# Patient Record
Sex: FEMALE | Race: ASIAN | NOT HISPANIC OR LATINO
[De-identification: names, ages, dates, MRNs, and addresses within clinical notes are randomized per-mention and may not be internally consistent; named-entity substitution may affect disease eponyms.]

---

## 2017-09-22 ENCOUNTER — FORM ENCOUNTER (OUTPATIENT)
Age: 62
End: 2017-09-22

## 2017-09-23 ENCOUNTER — APPOINTMENT (OUTPATIENT)
Dept: ULTRASOUND IMAGING | Facility: IMAGING CENTER | Age: 62
End: 2017-09-23
Payer: COMMERCIAL

## 2017-09-23 ENCOUNTER — OUTPATIENT (OUTPATIENT)
Dept: OUTPATIENT SERVICES | Facility: HOSPITAL | Age: 62
LOS: 1 days | End: 2017-09-23
Payer: COMMERCIAL

## 2017-09-23 DIAGNOSIS — Z98.89 OTHER SPECIFIED POSTPROCEDURAL STATES: Chronic | ICD-10-CM

## 2017-09-23 DIAGNOSIS — Z00.8 ENCOUNTER FOR OTHER GENERAL EXAMINATION: ICD-10-CM

## 2017-09-23 PROCEDURE — 93971 EXTREMITY STUDY: CPT | Mod: 26,RT

## 2017-09-23 PROCEDURE — 93971 EXTREMITY STUDY: CPT

## 2017-12-11 ENCOUNTER — APPOINTMENT (OUTPATIENT)
Dept: OPHTHALMOLOGY | Facility: CLINIC | Age: 62
End: 2017-12-11
Payer: COMMERCIAL

## 2017-12-11 PROCEDURE — 92014 COMPRE OPH EXAM EST PT 1/>: CPT

## 2017-12-11 PROCEDURE — 92015 DETERMINE REFRACTIVE STATE: CPT

## 2018-01-02 ENCOUNTER — EMERGENCY (EMERGENCY)
Facility: HOSPITAL | Age: 63
LOS: 1 days | Discharge: HOME | End: 2018-01-02
Attending: EMERGENCY MEDICINE | Admitting: EMERGENCY MEDICINE
Payer: COMMERCIAL

## 2018-01-02 VITALS
TEMPERATURE: 101 F | OXYGEN SATURATION: 100 % | DIASTOLIC BLOOD PRESSURE: 68 MMHG | HEART RATE: 102 BPM | RESPIRATION RATE: 18 BRPM | SYSTOLIC BLOOD PRESSURE: 147 MMHG

## 2018-01-02 DIAGNOSIS — Z98.89 OTHER SPECIFIED POSTPROCEDURAL STATES: Chronic | ICD-10-CM

## 2018-01-02 LAB
ALBUMIN SERPL ELPH-MCNC: 3.8 G/DL — SIGNIFICANT CHANGE UP (ref 3.3–5)
ALP SERPL-CCNC: 72 U/L — SIGNIFICANT CHANGE UP (ref 40–120)
ALT FLD-CCNC: 37 U/L — HIGH (ref 4–33)
AST SERPL-CCNC: 50 U/L — HIGH (ref 4–32)
BASE EXCESS BLDV CALC-SCNC: 0 MMOL/L — SIGNIFICANT CHANGE UP
BASOPHILS # BLD AUTO: 0.01 K/UL — SIGNIFICANT CHANGE UP (ref 0–0.2)
BASOPHILS NFR BLD AUTO: 0.2 % — SIGNIFICANT CHANGE UP (ref 0–2)
BILIRUB SERPL-MCNC: 0.2 MG/DL — SIGNIFICANT CHANGE UP (ref 0.2–1.2)
BLOOD GAS VENOUS - CREATININE: 0.72 MG/DL — SIGNIFICANT CHANGE UP (ref 0.5–1.3)
BUN SERPL-MCNC: 13 MG/DL — SIGNIFICANT CHANGE UP (ref 7–23)
CALCIUM SERPL-MCNC: 8.4 MG/DL — SIGNIFICANT CHANGE UP (ref 8.4–10.5)
CHLORIDE BLDV-SCNC: 108 MMOL/L — SIGNIFICANT CHANGE UP (ref 96–108)
CHLORIDE SERPL-SCNC: 103 MMOL/L — SIGNIFICANT CHANGE UP (ref 98–107)
CO2 SERPL-SCNC: 20 MMOL/L — LOW (ref 22–31)
CREAT SERPL-MCNC: 0.78 MG/DL — SIGNIFICANT CHANGE UP (ref 0.5–1.3)
EOSINOPHIL # BLD AUTO: 0.06 K/UL — SIGNIFICANT CHANGE UP (ref 0–0.5)
EOSINOPHIL NFR BLD AUTO: 1 % — SIGNIFICANT CHANGE UP (ref 0–6)
GAS PNL BLDV: 135 MMOL/L — LOW (ref 136–146)
GLUCOSE BLDV-MCNC: 140 — HIGH (ref 70–99)
GLUCOSE SERPL-MCNC: 134 MG/DL — HIGH (ref 70–99)
HCO3 BLDV-SCNC: 25 MMOL/L — SIGNIFICANT CHANGE UP (ref 20–27)
HCT VFR BLD CALC: 33.4 % — LOW (ref 34.5–45)
HCT VFR BLDV CALC: 35.7 % — SIGNIFICANT CHANGE UP (ref 34.5–45)
HGB BLD-MCNC: 11.2 G/DL — LOW (ref 11.5–15.5)
HGB BLDV-MCNC: 11.6 G/DL — SIGNIFICANT CHANGE UP (ref 11.5–15.5)
IMM GRANULOCYTES # BLD AUTO: 0.02 # — SIGNIFICANT CHANGE UP
IMM GRANULOCYTES NFR BLD AUTO: 0.3 % — SIGNIFICANT CHANGE UP (ref 0–1.5)
LACTATE BLDV-MCNC: 1.2 MMOL/L — SIGNIFICANT CHANGE UP (ref 0.5–2)
LYMPHOCYTES # BLD AUTO: 0.5 K/UL — LOW (ref 1–3.3)
LYMPHOCYTES # BLD AUTO: 8.3 % — LOW (ref 13–44)
MCHC RBC-ENTMCNC: 29.6 PG — SIGNIFICANT CHANGE UP (ref 27–34)
MCHC RBC-ENTMCNC: 33.5 % — SIGNIFICANT CHANGE UP (ref 32–36)
MCV RBC AUTO: 88.4 FL — SIGNIFICANT CHANGE UP (ref 80–100)
MONOCYTES # BLD AUTO: 0.49 K/UL — SIGNIFICANT CHANGE UP (ref 0–0.9)
MONOCYTES NFR BLD AUTO: 8.2 % — SIGNIFICANT CHANGE UP (ref 2–14)
NEUTROPHILS # BLD AUTO: 4.92 K/UL — SIGNIFICANT CHANGE UP (ref 1.8–7.4)
NEUTROPHILS NFR BLD AUTO: 82 % — HIGH (ref 43–77)
NRBC # FLD: 0 — SIGNIFICANT CHANGE UP
PCO2 BLDV: 36 MMHG — LOW (ref 41–51)
PH BLDV: 7.44 PH — HIGH (ref 7.32–7.43)
PLATELET # BLD AUTO: 195 K/UL — SIGNIFICANT CHANGE UP (ref 150–400)
PMV BLD: 10.7 FL — SIGNIFICANT CHANGE UP (ref 7–13)
PO2 BLDV: 66 MMHG — HIGH (ref 35–40)
POTASSIUM BLDV-SCNC: 3.5 MMOL/L — SIGNIFICANT CHANGE UP (ref 3.4–4.5)
POTASSIUM SERPL-MCNC: 4.3 MMOL/L — SIGNIFICANT CHANGE UP (ref 3.5–5.3)
POTASSIUM SERPL-SCNC: 4.3 MMOL/L — SIGNIFICANT CHANGE UP (ref 3.5–5.3)
PROT SERPL-MCNC: 7 G/DL — SIGNIFICANT CHANGE UP (ref 6–8.3)
RBC # BLD: 3.78 M/UL — LOW (ref 3.8–5.2)
RBC # FLD: 13.1 % — SIGNIFICANT CHANGE UP (ref 10.3–14.5)
SAO2 % BLDV: 93.5 % — HIGH (ref 60–85)
SODIUM SERPL-SCNC: 138 MMOL/L — SIGNIFICANT CHANGE UP (ref 135–145)
WBC # BLD: 6 K/UL — SIGNIFICANT CHANGE UP (ref 3.8–10.5)
WBC # FLD AUTO: 6 K/UL — SIGNIFICANT CHANGE UP (ref 3.8–10.5)

## 2018-01-02 PROCEDURE — 99284 EMERGENCY DEPT VISIT MOD MDM: CPT

## 2018-01-02 RX ORDER — ACETAMINOPHEN 500 MG
650 TABLET ORAL ONCE
Qty: 0 | Refills: 0 | Status: DISCONTINUED | OUTPATIENT
Start: 2018-01-02 | End: 2018-01-02

## 2018-01-02 RX ORDER — CEFTRIAXONE 500 MG/1
1 INJECTION, POWDER, FOR SOLUTION INTRAMUSCULAR; INTRAVENOUS ONCE
Qty: 0 | Refills: 0 | Status: COMPLETED | OUTPATIENT
Start: 2018-01-02 | End: 2018-01-02

## 2018-01-02 RX ORDER — KETOROLAC TROMETHAMINE 30 MG/ML
15 SYRINGE (ML) INJECTION ONCE
Qty: 0 | Refills: 0 | Status: DISCONTINUED | OUTPATIENT
Start: 2018-01-02 | End: 2018-01-02

## 2018-01-02 RX ORDER — SODIUM CHLORIDE 9 MG/ML
1000 INJECTION INTRAMUSCULAR; INTRAVENOUS; SUBCUTANEOUS ONCE
Qty: 0 | Refills: 0 | Status: COMPLETED | OUTPATIENT
Start: 2018-01-02 | End: 2018-01-02

## 2018-01-02 RX ORDER — AZITHROMYCIN 500 MG/1
500 TABLET, FILM COATED ORAL ONCE
Qty: 0 | Refills: 0 | Status: COMPLETED | OUTPATIENT
Start: 2018-01-02 | End: 2018-01-02

## 2018-01-02 RX ADMIN — AZITHROMYCIN 250 MILLIGRAM(S): 500 TABLET, FILM COATED ORAL at 23:41

## 2018-01-02 RX ADMIN — Medication 15 MILLIGRAM(S): at 23:42

## 2018-01-02 RX ADMIN — SODIUM CHLORIDE 1000 MILLILITER(S): 9 INJECTION INTRAMUSCULAR; INTRAVENOUS; SUBCUTANEOUS at 22:40

## 2018-01-02 RX ADMIN — CEFTRIAXONE 100 GRAM(S): 500 INJECTION, POWDER, FOR SOLUTION INTRAMUSCULAR; INTRAVENOUS at 22:40

## 2018-01-02 NOTE — ED ADULT NURSE NOTE - OBJECTIVE STATEMENT
pt aox3; reports congestion and sob on exertion for the past few days. Crackles heard on ascultation, pt with productive cough; sputum. No blood tinged sputum noted. Pt has pmh of htn, stents placed in 2011. Denies chest pain.  Denies sob at rest. Able to speak full sentences, no labored breathing observed at rest. Abdomen soft nontender. Pt febrile orally. 20g in right hand; labs sent, cultures sent. Will continue to assess/ monitor.

## 2018-01-02 NOTE — ED ADULT TRIAGE NOTE - CHIEF COMPLAINT QUOTE
Pt c/o shortness of breath with cough since last night, worsening today.  Pt endorsed fever of 102 at home.  Family gave pt Tylenol 1g at 1930 tonight.  PMHx cardiac stent x2.

## 2018-01-03 VITALS
RESPIRATION RATE: 18 BRPM | DIASTOLIC BLOOD PRESSURE: 65 MMHG | HEART RATE: 83 BPM | OXYGEN SATURATION: 100 % | SYSTOLIC BLOOD PRESSURE: 120 MMHG | TEMPERATURE: 99 F

## 2018-01-03 LAB
APPEARANCE UR: CLEAR — SIGNIFICANT CHANGE UP
B PERT DNA SPEC QL NAA+PROBE: SIGNIFICANT CHANGE UP
BASE EXCESS BLDV CALC-SCNC: -5.8 MMOL/L — SIGNIFICANT CHANGE UP
BILIRUB UR-MCNC: NEGATIVE — SIGNIFICANT CHANGE UP
BLOOD GAS VENOUS - CREATININE: 0.53 MG/DL — SIGNIFICANT CHANGE UP (ref 0.5–1.3)
BLOOD UR QL VISUAL: NEGATIVE — SIGNIFICANT CHANGE UP
C PNEUM DNA SPEC QL NAA+PROBE: NOT DETECTED — SIGNIFICANT CHANGE UP
CHLORIDE BLDV-SCNC: 115 MMOL/L — HIGH (ref 96–108)
COLOR SPEC: SIGNIFICANT CHANGE UP
FLUAV H1 2009 PAND RNA SPEC QL NAA+PROBE: POSITIVE — HIGH
FLUAV H1 RNA SPEC QL NAA+PROBE: NOT DETECTED — SIGNIFICANT CHANGE UP
FLUAV H3 RNA SPEC QL NAA+PROBE: NOT DETECTED — SIGNIFICANT CHANGE UP
FLUBV RNA SPEC QL NAA+PROBE: NOT DETECTED — SIGNIFICANT CHANGE UP
GAS PNL BLDV: 137 MMOL/L — SIGNIFICANT CHANGE UP (ref 136–146)
GLUCOSE BLDV-MCNC: 199 — HIGH (ref 70–99)
GLUCOSE UR-MCNC: NEGATIVE — SIGNIFICANT CHANGE UP
HADV DNA SPEC QL NAA+PROBE: NOT DETECTED — SIGNIFICANT CHANGE UP
HCO3 BLDV-SCNC: 20 MMOL/L — SIGNIFICANT CHANGE UP (ref 20–27)
HCOV 229E RNA SPEC QL NAA+PROBE: NOT DETECTED — SIGNIFICANT CHANGE UP
HCOV HKU1 RNA SPEC QL NAA+PROBE: NOT DETECTED — SIGNIFICANT CHANGE UP
HCOV NL63 RNA SPEC QL NAA+PROBE: NOT DETECTED — SIGNIFICANT CHANGE UP
HCOV OC43 RNA SPEC QL NAA+PROBE: NOT DETECTED — SIGNIFICANT CHANGE UP
HCT VFR BLDV CALC: 26.8 % — LOW (ref 34.5–45)
HGB BLDV-MCNC: 8.6 G/DL — LOW (ref 11.5–15.5)
HMPV RNA SPEC QL NAA+PROBE: NOT DETECTED — SIGNIFICANT CHANGE UP
HPIV1 RNA SPEC QL NAA+PROBE: NOT DETECTED — SIGNIFICANT CHANGE UP
HPIV2 RNA SPEC QL NAA+PROBE: NOT DETECTED — SIGNIFICANT CHANGE UP
HPIV3 RNA SPEC QL NAA+PROBE: NOT DETECTED — SIGNIFICANT CHANGE UP
HPIV4 RNA SPEC QL NAA+PROBE: NOT DETECTED — SIGNIFICANT CHANGE UP
KETONES UR-MCNC: NEGATIVE — SIGNIFICANT CHANGE UP
LACTATE BLDV-MCNC: 0.6 MMOL/L — SIGNIFICANT CHANGE UP (ref 0.5–2)
LEUKOCYTE ESTERASE UR-ACNC: NEGATIVE — SIGNIFICANT CHANGE UP
M PNEUMO DNA SPEC QL NAA+PROBE: NOT DETECTED — SIGNIFICANT CHANGE UP
MUCOUS THREADS # UR AUTO: SIGNIFICANT CHANGE UP
NITRITE UR-MCNC: NEGATIVE — SIGNIFICANT CHANGE UP
NON-SQ EPI CELLS # UR AUTO: <1 — SIGNIFICANT CHANGE UP
PCO2 BLDV: 31 MMHG — LOW (ref 41–51)
PH BLDV: 7.39 PH — SIGNIFICANT CHANGE UP (ref 7.32–7.43)
PH UR: 6.5 — SIGNIFICANT CHANGE UP (ref 4.6–8)
PO2 BLDV: 84 MMHG — HIGH (ref 35–40)
POTASSIUM BLDV-SCNC: 2.7 MMOL/L — CRITICAL LOW (ref 3.4–4.5)
PROT UR-MCNC: NEGATIVE MG/DL — SIGNIFICANT CHANGE UP
RBC CASTS # UR COMP ASSIST: SIGNIFICANT CHANGE UP (ref 0–?)
RSV RNA SPEC QL NAA+PROBE: NOT DETECTED — SIGNIFICANT CHANGE UP
RV+EV RNA SPEC QL NAA+PROBE: NOT DETECTED — SIGNIFICANT CHANGE UP
SAO2 % BLDV: 96.5 % — HIGH (ref 60–85)
SP GR SPEC: 1.01 — SIGNIFICANT CHANGE UP (ref 1–1.04)
SPECIMEN SOURCE: SIGNIFICANT CHANGE UP
SPECIMEN SOURCE: SIGNIFICANT CHANGE UP
SQUAMOUS # UR AUTO: SIGNIFICANT CHANGE UP
UROBILINOGEN FLD QL: NORMAL MG/DL — SIGNIFICANT CHANGE UP
WBC UR QL: SIGNIFICANT CHANGE UP (ref 0–?)

## 2018-01-03 PROCEDURE — 71046 X-RAY EXAM CHEST 2 VIEWS: CPT | Mod: 26

## 2018-01-03 RX ADMIN — Medication 75 MILLIGRAM(S): at 01:49

## 2018-01-03 RX ADMIN — Medication 15 MILLIGRAM(S): at 00:40

## 2018-01-03 NOTE — ED PROVIDER NOTE - OBJECTIVE STATEMENT
64yo f pmh HTN, HLD, p/w cough, pleuritic CP, shortness of breath. + fevers/body aches/chills. No known sick contacts.

## 2018-01-03 NOTE — ED PROVIDER NOTE - CARE PLAN
Principal Discharge DX:	SOB (shortness of breath) Principal Discharge DX:	SOB (shortness of breath)  Secondary Diagnosis:	Influenza

## 2018-01-03 NOTE — ED PROVIDER NOTE - ATTENDING CONTRIBUTION TO CARE
ED Attending Dr. Ham: 62 yo female with arthritis, breast cancer of left breast s/p lumpectomy, HTN, HLD in ED with  cough and SOB, associated with chest pain when taking a deep breath.  Found to have fever in ED.  On exam pt overall well appearing, in NAD, heart tachycardic, lungs CTAB, abd NTND, extremities without swelling, strength 5/5 in all extremities and skin without rash.

## 2018-01-04 LAB
BACTERIA UR CULT: SIGNIFICANT CHANGE UP
SPECIMEN SOURCE: SIGNIFICANT CHANGE UP

## 2018-01-07 LAB
BACTERIA BLD CULT: SIGNIFICANT CHANGE UP
BACTERIA BLD CULT: SIGNIFICANT CHANGE UP

## 2018-05-03 ENCOUNTER — TRANSCRIPTION ENCOUNTER (OUTPATIENT)
Age: 63
End: 2018-05-03

## 2018-09-06 ENCOUNTER — APPOINTMENT (OUTPATIENT)
Dept: CARDIOLOGY | Facility: CLINIC | Age: 63
End: 2018-09-06
Payer: COMMERCIAL

## 2018-09-06 ENCOUNTER — NON-APPOINTMENT (OUTPATIENT)
Age: 63
End: 2018-09-06

## 2018-09-06 VITALS
HEIGHT: 62 IN | DIASTOLIC BLOOD PRESSURE: 82 MMHG | WEIGHT: 194 LBS | SYSTOLIC BLOOD PRESSURE: 149 MMHG | BODY MASS INDEX: 35.7 KG/M2 | HEART RATE: 54 BPM | OXYGEN SATURATION: 99 %

## 2018-09-06 VITALS — DIASTOLIC BLOOD PRESSURE: 74 MMHG | SYSTOLIC BLOOD PRESSURE: 150 MMHG

## 2018-09-06 DIAGNOSIS — R01.1 CARDIAC MURMUR, UNSPECIFIED: ICD-10-CM

## 2018-09-06 PROCEDURE — 93000 ELECTROCARDIOGRAM COMPLETE: CPT

## 2018-09-06 PROCEDURE — 99245 OFF/OP CONSLTJ NEW/EST HI 55: CPT | Mod: 25

## 2018-09-06 RX ORDER — ASPIRIN ENTERIC COATED TABLETS 81 MG 81 MG/1
81 TABLET, DELAYED RELEASE ORAL
Refills: 0 | Status: ACTIVE | COMMUNITY

## 2018-09-06 RX ORDER — ENALAPRIL MALEATE 20 MG/1
20 TABLET ORAL DAILY
Refills: 0 | Status: ACTIVE | COMMUNITY

## 2018-09-06 RX ORDER — METOPROLOL SUCCINATE 25 MG/1
25 TABLET, EXTENDED RELEASE ORAL
Refills: 0 | Status: ACTIVE | COMMUNITY

## 2018-09-20 ENCOUNTER — TRANSCRIPTION ENCOUNTER (OUTPATIENT)
Age: 63
End: 2018-09-20

## 2018-09-20 ENCOUNTER — APPOINTMENT (OUTPATIENT)
Dept: CV DIAGNOSTICS | Facility: HOSPITAL | Age: 63
End: 2018-09-20

## 2018-09-20 ENCOUNTER — OUTPATIENT (OUTPATIENT)
Dept: OUTPATIENT SERVICES | Facility: HOSPITAL | Age: 63
LOS: 1 days | End: 2018-09-20
Payer: COMMERCIAL

## 2018-09-20 ENCOUNTER — APPOINTMENT (OUTPATIENT)
Dept: CV DIAGNOSITCS | Facility: HOSPITAL | Age: 63
End: 2018-09-20

## 2018-09-20 DIAGNOSIS — I25.10 ATHEROSCLEROTIC HEART DISEASE OF NATIVE CORONARY ARTERY WITHOUT ANGINA PECTORIS: ICD-10-CM

## 2018-09-20 DIAGNOSIS — Z98.89 OTHER SPECIFIED POSTPROCEDURAL STATES: Chronic | ICD-10-CM

## 2018-09-20 PROCEDURE — 93018 CV STRESS TEST I&R ONLY: CPT

## 2018-09-20 PROCEDURE — 93016 CV STRESS TEST SUPVJ ONLY: CPT

## 2018-09-20 PROCEDURE — 93306 TTE W/DOPPLER COMPLETE: CPT

## 2018-09-20 PROCEDURE — 93306 TTE W/DOPPLER COMPLETE: CPT | Mod: 26

## 2018-09-20 PROCEDURE — 78452 HT MUSCLE IMAGE SPECT MULT: CPT

## 2018-09-20 PROCEDURE — 93017 CV STRESS TEST TRACING ONLY: CPT

## 2018-09-20 PROCEDURE — 78452 HT MUSCLE IMAGE SPECT MULT: CPT | Mod: 26

## 2018-09-20 PROCEDURE — A9500: CPT

## 2018-10-18 ENCOUNTER — NON-APPOINTMENT (OUTPATIENT)
Age: 63
End: 2018-10-18

## 2018-10-18 ENCOUNTER — APPOINTMENT (OUTPATIENT)
Dept: CARDIOLOGY | Facility: CLINIC | Age: 63
End: 2018-10-18
Payer: COMMERCIAL

## 2018-10-18 VITALS — SYSTOLIC BLOOD PRESSURE: 161 MMHG | DIASTOLIC BLOOD PRESSURE: 82 MMHG | HEART RATE: 64 BPM

## 2018-10-18 PROCEDURE — 93000 ELECTROCARDIOGRAM COMPLETE: CPT

## 2018-10-18 PROCEDURE — 99214 OFFICE O/P EST MOD 30 MIN: CPT | Mod: 25

## 2018-10-21 ENCOUNTER — NON-APPOINTMENT (OUTPATIENT)
Age: 63
End: 2018-10-21

## 2018-10-21 LAB
ALBUMIN SERPL ELPH-MCNC: 4.2 G/DL
ALP BLD-CCNC: 80 U/L
ALT SERPL-CCNC: 37 U/L
ANION GAP SERPL CALC-SCNC: 15 MMOL/L
AST SERPL-CCNC: 30 U/L
BILIRUB SERPL-MCNC: 0.4 MG/DL
BUN SERPL-MCNC: 9 MG/DL
CALCIUM SERPL-MCNC: 9.6 MG/DL
CHLORIDE SERPL-SCNC: 105 MMOL/L
CO2 SERPL-SCNC: 24 MMOL/L
CREAT SERPL-MCNC: 0.76 MG/DL
GLUCOSE SERPL-MCNC: 136 MG/DL
POTASSIUM SERPL-SCNC: 3.9 MMOL/L
PROT SERPL-MCNC: 7.6 G/DL
SODIUM SERPL-SCNC: 144 MMOL/L

## 2018-10-21 RX ORDER — SPIRONOLACTONE 25 MG/1
25 TABLET ORAL DAILY
Refills: 0 | Status: ACTIVE | COMMUNITY

## 2018-10-22 LAB
CHOLEST SERPL-MCNC: 156 MG/DL
CHOLEST/HDLC SERPL: 3.4 RATIO
HDLC SERPL-MCNC: 46 MG/DL
LDLC SERPL CALC-MCNC: 77 MG/DL
TRIGL SERPL-MCNC: 164 MG/DL

## 2018-11-19 ENCOUNTER — APPOINTMENT (OUTPATIENT)
Dept: SURGERY | Facility: CLINIC | Age: 63
End: 2018-11-19
Payer: COMMERCIAL

## 2018-11-19 ENCOUNTER — LABORATORY RESULT (OUTPATIENT)
Age: 63
End: 2018-11-19

## 2018-11-19 VITALS
DIASTOLIC BLOOD PRESSURE: 78 MMHG | WEIGHT: 192 LBS | BODY MASS INDEX: 35.33 KG/M2 | SYSTOLIC BLOOD PRESSURE: 161 MMHG | HEIGHT: 62 IN | HEART RATE: 51 BPM

## 2018-11-19 DIAGNOSIS — Z86.39 PERSONAL HISTORY OF OTHER ENDOCRINE, NUTRITIONAL AND METABOLIC DISEASE: ICD-10-CM

## 2018-11-19 DIAGNOSIS — Z86.79 PERSONAL HISTORY OF OTHER DISEASES OF THE CIRCULATORY SYSTEM: ICD-10-CM

## 2018-11-19 PROCEDURE — 10022: CPT

## 2018-11-19 PROCEDURE — 76942 ECHO GUIDE FOR BIOPSY: CPT

## 2018-11-19 PROCEDURE — 99243 OFF/OP CNSLTJ NEW/EST LOW 30: CPT

## 2018-11-23 PROBLEM — Z86.79 HISTORY OF HYPERTENSION: Status: RESOLVED | Noted: 2018-11-23 | Resolved: 2018-11-23

## 2018-11-23 PROBLEM — Z86.39 HISTORY OF HIGH CHOLESTEROL: Status: RESOLVED | Noted: 2018-11-23 | Resolved: 2018-11-23

## 2018-11-29 ENCOUNTER — NON-APPOINTMENT (OUTPATIENT)
Age: 63
End: 2018-11-29

## 2018-11-29 ENCOUNTER — APPOINTMENT (OUTPATIENT)
Dept: CARDIOLOGY | Facility: CLINIC | Age: 63
End: 2018-11-29
Payer: COMMERCIAL

## 2018-11-29 VITALS
DIASTOLIC BLOOD PRESSURE: 76 MMHG | OXYGEN SATURATION: 97 % | HEART RATE: 53 BPM | SYSTOLIC BLOOD PRESSURE: 144 MMHG | WEIGHT: 194 LBS | BODY MASS INDEX: 35.48 KG/M2

## 2018-11-29 VITALS — DIASTOLIC BLOOD PRESSURE: 72 MMHG | SYSTOLIC BLOOD PRESSURE: 134 MMHG | HEART RATE: 54 BPM

## 2018-11-29 PROCEDURE — 93000 ELECTROCARDIOGRAM COMPLETE: CPT

## 2018-11-29 PROCEDURE — 99214 OFFICE O/P EST MOD 30 MIN: CPT | Mod: 25

## 2018-11-29 NOTE — HISTORY OF PRESENT ILLNESS
[FreeTextEntry1] : Patient is a 63 year old woman with a history of coronary artery disease status post stents to the OM1 and LAD 1/2014, HTN, hyperlipidemia, and family history of coronary artery disease who presents today for follow up of HTN. She states that she has been feeling relatively well, denying any chest pain, dyspnea, palpitations, and headaches. She states that she has been intermittently checking her blood pressure and her systolic blood pressure has been predominantly in the 130s with rare readings as low as 105 mmHg and as high as 152 mmHg. She has experienced episodes of dizziness when getting up quickly. She also states that she has been feeling tired.

## 2018-11-29 NOTE — PHYSICAL EXAM
[General Appearance - Well Developed] : well developed [Normal Appearance] : normal appearance [Well Groomed] : well groomed [General Appearance - Well Nourished] : well nourished [No Deformities] : no deformities [General Appearance - In No Acute Distress] : no acute distress [Normal Conjunctiva] : the conjunctiva exhibited no abnormalities [Normal Oral Mucosa] : normal oral mucosa [No Oral Pallor] : no oral pallor [No Oral Cyanosis] : no oral cyanosis [Normal Jugular Venous A Waves Present] : normal jugular venous A waves present [Normal Jugular Venous V Waves Present] : normal jugular venous V waves present [No Jugular Venous Clemons A Waves] : no jugular venous clemons A waves [Respiration, Rhythm And Depth] : normal respiratory rhythm and effort [Exaggerated Use Of Accessory Muscles For Inspiration] : no accessory muscle use [Auscultation Breath Sounds / Voice Sounds] : lungs were clear to auscultation bilaterally [Bowel Sounds] : normal bowel sounds [Abdomen Soft] : soft [Abdomen Tenderness] : non-tender [Abnormal Walk] : normal gait [Nail Clubbing] : no clubbing of the fingernails [Cyanosis, Localized] : no localized cyanosis [Petechial Hemorrhages (___cm)] : no petechial hemorrhages [Skin Color & Pigmentation] : normal skin color and pigmentation [] : no rash [Skin Lesions] : no skin lesions [No Skin Ulcers] : no skin ulcer [Oriented To Time, Place, And Person] : oriented to person, place, and time [Affect] : the affect was normal [Mood] : the mood was normal [No Anxiety] : not feeling anxious [Rhythm Regular] : regular [Normal S1] : normal S1 [Normal S2] : normal S2 [II] : a grade 2 [No Pitting Edema] : no pitting edema present [FreeTextEntry1] : Extraocular muscles intact. Anicteric sclerae. [Bradycardia] : bradycardic [S3] : no S3 [Right Carotid Bruit] : no bruit heard over the right carotid [Left Carotid Bruit] : no bruit heard over the left carotid [Bruit] : no bruit heard

## 2018-11-29 NOTE — DISCUSSION/SUMMARY
[FreeTextEntry1] : IMPRESSION: Ms. GUILLORY is a 63 year -old woman with a  history of coronary artery disease, HTN, hyperlipidemia, and family history of coronary artery disease who presents today for follow up of HTN.\par \par PLAN:\par 1. She had a nuclear stress test about 2 months ago that did not reveal any infarct or ischemia. She will continue on ASA 81mg daily.\par 2. She had mild to moderate mitral regurgitation on her recent echocardiogram. She should have a repeat echo in about 1-2 years.\par 3. Her blood pressure is improved and for the most past has been good at home. I will be checking a BMP to evaluate for any toxic effects to her potassium and creatinine on the increased dose of Spironolactone. For now, she will modify her diet and continue on Amlodipine 5mg twice daily, Enalapril 20mg daily, Aldactone 25mg twice daily, and Toprol XL 25mg daily. \par 4. She will continue on Simvastatin 10mg daily. Her goal LDL is less than 70. She will modify her diet given her mildly elevated triglycerides.  \par 5. She will follow up with me in 3 months for follow up of her blood pressure.\par 6. I have asked her to increase her water intake given her episodes of dizziness. If her fatigue and dizziness persist, we will need to consider decreasing the dose of her Metoprolol as her bradycardia may be contributing to her symptoms.

## 2018-11-30 LAB
ANION GAP SERPL CALC-SCNC: 12 MMOL/L
BUN SERPL-MCNC: 11 MG/DL
CALCIUM SERPL-MCNC: 9.5 MG/DL
CHLORIDE SERPL-SCNC: 107 MMOL/L
CO2 SERPL-SCNC: 24 MMOL/L
CREAT SERPL-MCNC: 0.74 MG/DL
GLUCOSE SERPL-MCNC: 120 MG/DL
POTASSIUM SERPL-SCNC: 4.1 MMOL/L
SODIUM SERPL-SCNC: 143 MMOL/L

## 2019-02-02 ENCOUNTER — RX RENEWAL (OUTPATIENT)
Age: 64
End: 2019-02-02

## 2019-03-10 ENCOUNTER — TRANSCRIPTION ENCOUNTER (OUTPATIENT)
Age: 64
End: 2019-03-10

## 2019-03-14 ENCOUNTER — APPOINTMENT (OUTPATIENT)
Dept: CARDIOLOGY | Facility: CLINIC | Age: 64
End: 2019-03-14
Payer: COMMERCIAL

## 2019-03-14 VITALS — HEART RATE: 54 BPM | DIASTOLIC BLOOD PRESSURE: 80 MMHG | SYSTOLIC BLOOD PRESSURE: 126 MMHG

## 2019-03-14 PROCEDURE — 93000 ELECTROCARDIOGRAM COMPLETE: CPT

## 2019-03-14 PROCEDURE — 99214 OFFICE O/P EST MOD 30 MIN: CPT | Mod: 25

## 2019-03-14 NOTE — PHYSICAL EXAM
[General Appearance - Well Developed] : well developed [Normal Appearance] : normal appearance [Well Groomed] : well groomed [General Appearance - Well Nourished] : well nourished [No Deformities] : no deformities [General Appearance - In No Acute Distress] : no acute distress [Normal Conjunctiva] : the conjunctiva exhibited no abnormalities [Normal Oral Mucosa] : normal oral mucosa [No Oral Pallor] : no oral pallor [No Oral Cyanosis] : no oral cyanosis [Normal Jugular Venous A Waves Present] : normal jugular venous A waves present [Normal Jugular Venous V Waves Present] : normal jugular venous V waves present [No Jugular Venous Clemons A Waves] : no jugular venous clemons A waves [Respiration, Rhythm And Depth] : normal respiratory rhythm and effort [Exaggerated Use Of Accessory Muscles For Inspiration] : no accessory muscle use [Auscultation Breath Sounds / Voice Sounds] : lungs were clear to auscultation bilaterally [Bowel Sounds] : normal bowel sounds [Abdomen Soft] : soft [Abdomen Tenderness] : non-tender [Abnormal Walk] : normal gait [Nail Clubbing] : no clubbing of the fingernails [Cyanosis, Localized] : no localized cyanosis [Petechial Hemorrhages (___cm)] : no petechial hemorrhages [Skin Color & Pigmentation] : normal skin color and pigmentation [] : no rash [Skin Lesions] : no skin lesions [No Skin Ulcers] : no skin ulcer [Oriented To Time, Place, And Person] : oriented to person, place, and time [Affect] : the affect was normal [Mood] : the mood was normal [No Anxiety] : not feeling anxious [Bradycardia] : bradycardic [Rhythm Regular] : regular [Normal S1] : normal S1 [Normal S2] : normal S2 [II] : a grade 2 [No Pitting Edema] : no pitting edema present [FreeTextEntry1] : Extraocular muscles intact. Anicteric sclerae. [S3] : no S3 [Right Carotid Bruit] : no bruit heard over the right carotid [Left Carotid Bruit] : no bruit heard over the left carotid [Bruit] : no bruit heard

## 2019-03-14 NOTE — DISCUSSION/SUMMARY
[FreeTextEntry1] : IMPRESSION: Ms. GUILLORY is a 64 year -old woman with a  history of coronary artery disease, HTN, hyperlipidemia, and family history of coronary artery disease who presents today for follow up of HTN.\par \par PLAN:\par 1. She had a nuclear stress test about 6 months ago that did not reveal any infarct or ischemia. She will continue on ASA 81mg daily. She had an episode of atypical chest pain. Should she continue to experience episodes, then we can consider a cardiac cath.\par 2. She had mild to moderate mitral regurgitation on her recent echocardiogram. She should have a repeat echo in about 1 year.\par 3. Her blood pressure is very good today, however, she has had some high readings at home. She will modify her diet and will continue on Amlodipine 5mg twice daily, Enalapril 20mg daily, Aldactone 25mg twice daily, and Toprol XL 25mg daily. I will be checking a CMP today. She will continue to follow her blood pressure at home.\par 4. She will continue on Simvastatin 10mg daily. Her goal LDL is less than 70. She will modify her diet given her mildly elevated triglycerides.  I will be checking a lipid profile today.\par 5. She will follow up with me in 3 months for follow up of her blood pressure or sooner should she experience any new or worsening symptoms in the interim.

## 2019-03-14 NOTE — HISTORY OF PRESENT ILLNESS
[FreeTextEntry1] : Patient is a 64 year old woman with a history of coronary artery disease status post stents to the OM1 and LAD 1/2014, HTN, hyperlipidemia, and family history of coronary artery disease who presents today for follow up of HTN. She mentions experiencing dyspnea with exertion, but no exertional chest pain. She states that she had an episode of chest pain at rest about 10 days ago that occurred after eating tomatoes. She has not had any recurrent symptoms since that time as she has been avoiding certain foods. She states that she has been intermittently checking her blood pressure at home and it has been in the 130s-140s with rare readings as high as 160 mmHg. She otherwise denies any dyspnea at rest, palpitations, headaches, and dizziness.

## 2019-03-26 LAB
ALBUMIN SERPL ELPH-MCNC: 4.1 G/DL
ALP BLD-CCNC: 81 U/L
ALT SERPL-CCNC: 30 U/L
ANION GAP SERPL CALC-SCNC: 12 MMOL/L
AST SERPL-CCNC: 24 U/L
BILIRUB SERPL-MCNC: 0.4 MG/DL
BUN SERPL-MCNC: 11 MG/DL
CALCIUM SERPL-MCNC: 9.3 MG/DL
CHLORIDE SERPL-SCNC: 108 MMOL/L
CHOLEST SERPL-MCNC: 230 MG/DL
CHOLEST/HDLC SERPL: 5 RATIO
CO2 SERPL-SCNC: 25 MMOL/L
CREAT SERPL-MCNC: 0.77 MG/DL
GLUCOSE SERPL-MCNC: 120 MG/DL
HBA1C MFR BLD HPLC: 6.6 %
HDLC SERPL-MCNC: 46 MG/DL
LDLC SERPL CALC-MCNC: 145 MG/DL
POTASSIUM SERPL-SCNC: 4.2 MMOL/L
PROT SERPL-MCNC: 6.9 G/DL
SODIUM SERPL-SCNC: 145 MMOL/L
TRIGL SERPL-MCNC: 196 MG/DL
TSH SERPL-ACNC: 2.08 UIU/ML

## 2019-03-28 LAB
BASOPHILS # BLD AUTO: 0.02 K/UL
BASOPHILS NFR BLD AUTO: 0.3 %
EOSINOPHIL # BLD AUTO: 0.17 K/UL
EOSINOPHIL NFR BLD AUTO: 2.7 %
HCT VFR BLD CALC: 38.4 %
HGB BLD-MCNC: 12 G/DL
IMM GRANULOCYTES NFR BLD AUTO: 0.2 %
LYMPHOCYTES # BLD AUTO: 2.09 K/UL
LYMPHOCYTES NFR BLD AUTO: 33 %
MAN DIFF?: NORMAL
MCHC RBC-ENTMCNC: 28.3 PG
MCHC RBC-ENTMCNC: 31.3 GM/DL
MCV RBC AUTO: 90.6 FL
MONOCYTES # BLD AUTO: 0.37 K/UL
MONOCYTES NFR BLD AUTO: 5.8 %
NEUTROPHILS # BLD AUTO: 3.68 K/UL
NEUTROPHILS NFR BLD AUTO: 58 %
PLATELET # BLD AUTO: 246 K/UL
RBC # BLD: 4.24 M/UL
RBC # FLD: 13.4 %
WBC # FLD AUTO: 6.34 K/UL

## 2019-03-28 RX ORDER — SIMVASTATIN 10 MG/1
10 TABLET, FILM COATED ORAL
Refills: 0 | Status: DISCONTINUED | COMMUNITY
End: 2019-03-28

## 2019-06-03 ENCOUNTER — APPOINTMENT (OUTPATIENT)
Dept: SURGERY | Facility: CLINIC | Age: 64
End: 2019-06-03
Payer: COMMERCIAL

## 2019-06-03 PROCEDURE — 99213 OFFICE O/P EST LOW 20 MIN: CPT

## 2019-06-03 RX ORDER — PREDNISONE 10 MG/1
10 TABLET ORAL
Qty: 20 | Refills: 0 | Status: DISCONTINUED | COMMUNITY
Start: 2019-05-01

## 2019-06-03 NOTE — PHYSICAL EXAM
[de-identified] : no cervical or supraclavicular adenopathy, trachea midline, neck short and thick, thyroid full without discreet nodule.  [Normal] : orientation to person, place, and time: normal

## 2019-06-03 NOTE — HISTORY OF PRESENT ILLNESS
[de-identified] : Patient referred by Dr. Arellano for evaluation of right thyroid nodule. During workup for abnormality noted on chest x-ray, chest CT revealed thyroid nodule. Thyroid ultrasound August 2018: Solitary right mid nodule 19 x 13 x 16 mm. No enlarged lymph nodes. Patient denies prior thyroid history, change in voice, dysphagia or radiation exposure.\par FNA right thyroid nodule 11/2018 benign.  repeat US 5/2019 stable nodules.  no suspicious characteristics.  denies dysphagia, hoarseness or SOB

## 2019-06-20 ENCOUNTER — APPOINTMENT (OUTPATIENT)
Dept: CARDIOLOGY | Facility: CLINIC | Age: 64
End: 2019-06-20
Payer: COMMERCIAL

## 2019-06-20 ENCOUNTER — NON-APPOINTMENT (OUTPATIENT)
Age: 64
End: 2019-06-20

## 2019-06-20 VITALS
DIASTOLIC BLOOD PRESSURE: 76 MMHG | SYSTOLIC BLOOD PRESSURE: 130 MMHG | HEIGHT: 62 IN | OXYGEN SATURATION: 97 % | HEART RATE: 62 BPM

## 2019-06-20 PROCEDURE — 93000 ELECTROCARDIOGRAM COMPLETE: CPT

## 2019-06-20 PROCEDURE — 99214 OFFICE O/P EST MOD 30 MIN: CPT | Mod: 25

## 2019-06-20 RX ORDER — AMLODIPINE BESYLATE 2.5 MG/1
2.5 TABLET ORAL DAILY
Refills: 5 | Status: DISCONTINUED | COMMUNITY
End: 2019-06-20

## 2019-06-20 RX ORDER — AMLODIPINE BESYLATE 10 MG/1
10 TABLET ORAL DAILY
Refills: 2 | Status: ACTIVE | COMMUNITY
Start: 2019-02-02

## 2019-06-20 NOTE — REVIEW OF SYSTEMS
[Headache] : headache [Dyspnea on exertion] : dyspnea during exertion [Dizziness] : dizziness [Negative] : Heme/Lymph

## 2019-06-20 NOTE — HISTORY OF PRESENT ILLNESS
[FreeTextEntry1] : Patient is a 64 year old woman with a history of coronary artery disease status post stents to the OM1 and LAD 1/2014, HTN, hyperlipidemia, diet- controlled Diabetes mellitus, and family history of coronary artery disease who presents today for follow up of HTN. She mentions experiencing dyspnea with exertion, but no exertional chest pain. She states that she suffered from episodes of vertigo this past April. She mentions rare episodes of ankles swelling. She states that she has intermittently checked her blood pressure at home and has predominantly gotten systolic readings between the 130s-140s with a couple of readings in the 160s. She has experienced rare headaches which she feels may be from her sinuses. She otherwise denies any dyspnea at rest, palpitations, and chest pain.

## 2019-06-20 NOTE — PHYSICAL EXAM
[General Appearance - Well Developed] : well developed [Normal Appearance] : normal appearance [Well Groomed] : well groomed [General Appearance - Well Nourished] : well nourished [No Deformities] : no deformities [General Appearance - In No Acute Distress] : no acute distress [Normal Conjunctiva] : the conjunctiva exhibited no abnormalities [Normal Oral Mucosa] : normal oral mucosa [No Oral Pallor] : no oral pallor [No Oral Cyanosis] : no oral cyanosis [Normal Jugular Venous A Waves Present] : normal jugular venous A waves present [Normal Jugular Venous V Waves Present] : normal jugular venous V waves present [No Jugular Venous Clemons A Waves] : no jugular venous clemons A waves [Respiration, Rhythm And Depth] : normal respiratory rhythm and effort [Exaggerated Use Of Accessory Muscles For Inspiration] : no accessory muscle use [Auscultation Breath Sounds / Voice Sounds] : lungs were clear to auscultation bilaterally [Bowel Sounds] : normal bowel sounds [Abdomen Soft] : soft [Abdomen Tenderness] : non-tender [Abnormal Walk] : normal gait [Nail Clubbing] : no clubbing of the fingernails [Cyanosis, Localized] : no localized cyanosis [Petechial Hemorrhages (___cm)] : no petechial hemorrhages [Skin Color & Pigmentation] : normal skin color and pigmentation [] : no rash [Skin Lesions] : no skin lesions [Oriented To Time, Place, And Person] : oriented to person, place, and time [No Skin Ulcers] : no skin ulcer [Affect] : the affect was normal [Mood] : the mood was normal [No Anxiety] : not feeling anxious [Rhythm Regular] : regular [Normal S1] : normal S1 [Normal S2] : normal S2 [II] : a grade 2 [No Pitting Edema] : no pitting edema present [FreeTextEntry1] : Extraocular muscles intact. Anicteric sclerae. [Normal Rate] : normal [S3] : no S3 [Right Carotid Bruit] : no bruit heard over the right carotid [Bruit] : no bruit heard [Left Carotid Bruit] : no bruit heard over the left carotid

## 2019-06-21 LAB
ALBUMIN SERPL ELPH-MCNC: 4.1 G/DL
ALP BLD-CCNC: 77 U/L
ALT SERPL-CCNC: 25 U/L
ANION GAP SERPL CALC-SCNC: 13 MMOL/L
AST SERPL-CCNC: 21 U/L
BILIRUB SERPL-MCNC: 0.4 MG/DL
BUN SERPL-MCNC: 11 MG/DL
CALCIUM SERPL-MCNC: 9.6 MG/DL
CHLORIDE SERPL-SCNC: 106 MMOL/L
CHOLEST SERPL-MCNC: 183 MG/DL
CHOLEST/HDLC SERPL: 3.8 RATIO
CO2 SERPL-SCNC: 24 MMOL/L
CREAT SERPL-MCNC: 0.77 MG/DL
GLUCOSE SERPL-MCNC: 126 MG/DL
HDLC SERPL-MCNC: 48 MG/DL
LDLC SERPL CALC-MCNC: 98 MG/DL
POTASSIUM SERPL-SCNC: 4.4 MMOL/L
PROT SERPL-MCNC: 7.2 G/DL
SODIUM SERPL-SCNC: 143 MMOL/L
TRIGL SERPL-MCNC: 187 MG/DL

## 2019-07-05 NOTE — ED ADULT NURSE NOTE - NSPATIENTFLAG_GEN_A_ER
on call.  The in-house cardiology fellow, Dr Murguia, explained that the Interventional cardiology fellow at Nell J. Redfield Memorial [phone]  P: [phone]       Red X (Sepsis)

## 2019-07-15 LAB
ESTIMATED AVERAGE GLUCOSE: 137 MG/DL
HBA1C MFR BLD HPLC: 6.4 %

## 2019-07-22 ENCOUNTER — EMERGENCY (EMERGENCY)
Facility: HOSPITAL | Age: 64
LOS: 1 days | Discharge: HOME | End: 2019-07-22
Attending: EMERGENCY MEDICINE | Admitting: EMERGENCY MEDICINE
Payer: COMMERCIAL

## 2019-07-22 VITALS
HEART RATE: 85 BPM | DIASTOLIC BLOOD PRESSURE: 67 MMHG | SYSTOLIC BLOOD PRESSURE: 177 MMHG | RESPIRATION RATE: 16 BRPM | TEMPERATURE: 98 F | OXYGEN SATURATION: 100 %

## 2019-07-22 DIAGNOSIS — Z98.89 OTHER SPECIFIED POSTPROCEDURAL STATES: Chronic | ICD-10-CM

## 2019-07-22 LAB
ALBUMIN SERPL ELPH-MCNC: 4.2 G/DL — SIGNIFICANT CHANGE UP (ref 3.3–5)
ALP SERPL-CCNC: 80 U/L — SIGNIFICANT CHANGE UP (ref 40–120)
ALT FLD-CCNC: 25 U/L — SIGNIFICANT CHANGE UP (ref 4–33)
ANION GAP SERPL CALC-SCNC: 15 MMO/L — HIGH (ref 7–14)
APTT BLD: 26.5 SEC — LOW (ref 27.5–36.3)
AST SERPL-CCNC: 24 U/L — SIGNIFICANT CHANGE UP (ref 4–32)
BASOPHILS # BLD AUTO: 0.03 K/UL — SIGNIFICANT CHANGE UP (ref 0–0.2)
BASOPHILS NFR BLD AUTO: 0.3 % — SIGNIFICANT CHANGE UP (ref 0–2)
BILIRUB SERPL-MCNC: 0.3 MG/DL — SIGNIFICANT CHANGE UP (ref 0.2–1.2)
BUN SERPL-MCNC: 11 MG/DL — SIGNIFICANT CHANGE UP (ref 7–23)
CALCIUM SERPL-MCNC: 9.7 MG/DL — SIGNIFICANT CHANGE UP (ref 8.4–10.5)
CHLORIDE SERPL-SCNC: 103 MMOL/L — SIGNIFICANT CHANGE UP (ref 98–107)
CO2 SERPL-SCNC: 22 MMOL/L — SIGNIFICANT CHANGE UP (ref 22–31)
CREAT SERPL-MCNC: 0.75 MG/DL — SIGNIFICANT CHANGE UP (ref 0.5–1.3)
EOSINOPHIL # BLD AUTO: 0.16 K/UL — SIGNIFICANT CHANGE UP (ref 0–0.5)
EOSINOPHIL NFR BLD AUTO: 1.8 % — SIGNIFICANT CHANGE UP (ref 0–6)
GLUCOSE SERPL-MCNC: 161 MG/DL — HIGH (ref 70–99)
HCT VFR BLD CALC: 36.9 % — SIGNIFICANT CHANGE UP (ref 34.5–45)
HGB BLD-MCNC: 11.9 G/DL — SIGNIFICANT CHANGE UP (ref 11.5–15.5)
IMM GRANULOCYTES NFR BLD AUTO: 0.3 % — SIGNIFICANT CHANGE UP (ref 0–1.5)
INR BLD: 1 — SIGNIFICANT CHANGE UP (ref 0.88–1.17)
LYMPHOCYTES # BLD AUTO: 2.18 K/UL — SIGNIFICANT CHANGE UP (ref 1–3.3)
LYMPHOCYTES # BLD AUTO: 24.2 % — SIGNIFICANT CHANGE UP (ref 13–44)
MCHC RBC-ENTMCNC: 28.6 PG — SIGNIFICANT CHANGE UP (ref 27–34)
MCHC RBC-ENTMCNC: 32.2 % — SIGNIFICANT CHANGE UP (ref 32–36)
MCV RBC AUTO: 88.7 FL — SIGNIFICANT CHANGE UP (ref 80–100)
MONOCYTES # BLD AUTO: 0.5 K/UL — SIGNIFICANT CHANGE UP (ref 0–0.9)
MONOCYTES NFR BLD AUTO: 5.6 % — SIGNIFICANT CHANGE UP (ref 2–14)
NEUTROPHILS # BLD AUTO: 6.1 K/UL — SIGNIFICANT CHANGE UP (ref 1.8–7.4)
NEUTROPHILS NFR BLD AUTO: 67.8 % — SIGNIFICANT CHANGE UP (ref 43–77)
NRBC # FLD: 0 K/UL — SIGNIFICANT CHANGE UP (ref 0–0)
PLATELET # BLD AUTO: 227 K/UL — SIGNIFICANT CHANGE UP (ref 150–400)
PMV BLD: 10.2 FL — SIGNIFICANT CHANGE UP (ref 7–13)
POTASSIUM SERPL-MCNC: 4.2 MMOL/L — SIGNIFICANT CHANGE UP (ref 3.5–5.3)
POTASSIUM SERPL-SCNC: 4.2 MMOL/L — SIGNIFICANT CHANGE UP (ref 3.5–5.3)
PROT SERPL-MCNC: 7.9 G/DL — SIGNIFICANT CHANGE UP (ref 6–8.3)
PROTHROM AB SERPL-ACNC: 11.1 SEC — SIGNIFICANT CHANGE UP (ref 9.8–13.1)
RBC # BLD: 4.16 M/UL — SIGNIFICANT CHANGE UP (ref 3.8–5.2)
RBC # FLD: 13.1 % — SIGNIFICANT CHANGE UP (ref 10.3–14.5)
SODIUM SERPL-SCNC: 140 MMOL/L — SIGNIFICANT CHANGE UP (ref 135–145)
WBC # BLD: 9 K/UL — SIGNIFICANT CHANGE UP (ref 3.8–10.5)
WBC # FLD AUTO: 9 K/UL — SIGNIFICANT CHANGE UP (ref 3.8–10.5)

## 2019-07-22 PROCEDURE — 99284 EMERGENCY DEPT VISIT MOD MDM: CPT

## 2019-07-22 PROCEDURE — 70450 CT HEAD/BRAIN W/O DYE: CPT | Mod: 26

## 2019-07-22 NOTE — ED PROVIDER NOTE - PHYSICAL EXAMINATION
GEN: Well appearing, well nourished, in no apparent distress.  HEAD: NCAT  HEENT: PERRL, EOMI, Diplopia with ?L temporal hemianopsia, no nystagmus, no facial droop, Airway patent, uvula midline, MMM, neck supple, no LAD, no JVD, no raccoon sign, no blancas sign   LUNG: CTAB, no adventitious sounds, no retractions, no nasal flaring  CV: RRR, no murmurs,   Abd: soft, NTND, no rebound or guarding, BS+ in all quadrants, no CVAT  MSK: WWP, Pulses 2+ in extremities, No edema, no visible deformities, strength 5/5 in all extremities, no midspine TTP  Neuro:  CN II-XII in tact. AAOx3, Ambulatory with stable gait. sensations in tact in all extremities, neg romberg, neg pronator drift, neg finger to nose,   Skin: Warm and dry, no evidence of rash  Psych: normal mood and affect GEN: Well appearing, well nourished, in no apparent distress.  HEAD: NCAT  HEENT: PERRL, EOMI, Diplopia without homonymous hemianopsia, no nystagmus, no facial droop, Airway patent, uvula midline, MMM, neck supple, no LAD, no JVD, no raccoon sign, no blancas sign   LUNG: CTAB, no adventitious sounds, no retractions, no nasal flaring  CV: RRR, no murmurs,   Abd: soft, NTND, no rebound or guarding, BS+ in all quadrants, no CVAT  MSK: WWP, Pulses 2+ in extremities, No edema, no visible deformities, strength 5/5 in all extremities, no midspine TTP  Neuro:  CN II-XII in tact. AAOx3, Ambulatory with stable gait. sensations in tact in all extremities, neg romberg, neg pronator drift, neg finger to nose,   Skin: Warm and dry, no evidence of rash  Psych: normal mood and affect

## 2019-07-22 NOTE — ED PROVIDER NOTE - NS ED ROS FT
Constitutional: no fevers, no chills.  Eyes: + L blurry vision  Ears: no ear drainage, no ear pain.  Nose: no nasal congestion.  Mouth/Throat: no sore throat.  Cardiovascular: no chest pain.  Respiratory: no shortness of breath, no wheezing, no cough  Gastrointestinal: no nausea, no vomiting, no diarrhea, no abdominal pain.  MSK: no flank pain, no back pain.  Genitourinary: no dysuria, no hematuria.  Skin: no rashes.  Neuro: no headache,   Psychiatric: no known mental health issues.

## 2019-07-22 NOTE — CONSULT NOTE ADULT - SUBJECTIVE AND OBJECTIVE BOX
Neurology  Consult Note  07-22-19    Name:  DIETER GUILLORY; 64y (1955)    Reason for Admission:     Chief Complaint:  HPI:    Review of Systems:  As states in HPI.    morphine (Pruritus)      PMHx:   Breast cancer  Arthritis  Hypertension  Hyperlipemia    PFHx:     PSuHx:   S/P breast lumpectomy  History of D&C    PSoHx:     Marital Status:  (   )    (   ) Single    (   )    (  )   Occupation:   Lives with: (  ) alone  (  ) children   (  ) spouse   (  ) parents  (  ) other  Recent Travel:     Substance Use (street drugs): (  ) never used  (  ) other:  Tobacco Usage:  (   ) never smoked   (   ) former smoker   (   ) current smoker  (     ) pack year  Alcohol Usage:  Sexual History:         Medications:  MEDICATIONS  (STANDING):    MEDICATIONS  (PRN):    Vitals:  T(C): 36.8 (07-22-19 @ 18:00), Max: 36.8 (07-22-19 @ 18:00)  HR: 74 (07-22-19 @ 18:30) (74 - 85)  BP: 156/74 (07-22-19 @ 18:30) (156/74 - 177/67)  RR: 15 (07-22-19 @ 18:30) (15 - 16)  SpO2: 100% (07-22-19 @ 18:30) (100% - 100%)    Labs:                        11.9   9.00  )-----------( 227      ( 22 Jul 2019 18:36 )             36.9           CAPILLARY BLOOD GLUCOSE      POCT Blood Glucose.: 162 mg/dL (22 Jul 2019 18:09)        PT/INR - ( 22 Jul 2019 18:36 )   PT: 11.1 SEC;   INR: 1.00          PTT - ( 22 Jul 2019 18:36 )  PTT:26.5 SEC  CSF:                      PHYSICAL EXAMINATION:  General: Well-developed, well nourished, in mild distress 2/2 HA.   Eyes: Conjunctiva and sclera clear.  Neurologic:  - Mental Status:  Alert, awake, oriented to person, place, and time; Speech is fluent with intact naming, repetition, and comprehension; Good overall fund of knowledge; Immediate recall is 3/3 words and delayed recall is 3/3 words at 5 minutes; Able to spell WORLD backwards and perform serial 7 subtraction; Able to read and write a sentence.  - Cranial Nerves II-XII:    II: Blurry vision in left eye only.  Visual fields are full to confrontation; Pupils are equal, round, and reactive to light;  III, IV, VI:  Extraocular movements are intact without nystagmus.  V:  Facial sensation is intact in the V1-V3 distribution bilaterally.  VII:  Face is symmetric with normal eye closure and smile  VIII:  Hearing is intact to finger rub.  IX, X:  Uvula is midline and soft palate rises symmetrically  XI:  Head turning and shoulder shrug are intact.  XII:  Tongue protudes in the midline.  - Motor:  Strength is 5/5 throughout.  There is no pronator drift.  Normal muscle bulk and tone throughout.  - Reflexes:  2+ and symmetric at the biceps, triceps, brachioradialis, knees, and ankles.  Plantar responses flexor.  - Sensory:  Intact throughout to vibration, and joint-position, light touch, pin prick.  - Coordination:  Finger-nose-finger and heel-knee-shin intact without dysmetria.  Rapid alternating hand movements intact.  - Gait:   Normal steps, base, arm swing, and turning.  Heel and toe walking are normal.  Tandem gait is normal.  Romberg testing is negative.    Radiology:  < from: CT Brain Stroke Protocol (07.22.19 @ 18:30) >  IMPRESSION:     No CT evidence of acute intracranial hemorrhage, mass effect, or midline   shift.    These findings were discussed with Dr. Peck at 7/22/2019 6:27 PM by Dr. Barger with read back confirmation.    AUBREY BARGER M.D., RADIOLOGY RESIDENT  This document has been electronically signed.  JEANNIE RICHMOND M.D., ATTENDING RADIOLOGIST  This document has been electronically signed. Jul 22 2019  6:33PM  < end of copied text > Neurology  Consult Note  07-22-19    Name:  DIETER GUILLORY; 64y (1955)    Reason for Admission:     Chief Complaint:  Blurry vision    HPI:  65yo  woman with a PMHx of HTN, HLD, pre-DM, Headache (5-6/wk) presents with complaints of acute onset blurry vision and HA. Patient accompanied by daughter, speech pathologist. Patient reports that she was watching TV at 450pm when she noticed sudden onset blurry vision. Reports blurry vision in left eye only. States vision remains blurry and has improved slightly. HA associated w/ blurry vision onset. HA worsening for approximately 30mins before stabilizing. Reports photosensitivity but denies nausea.   In the ED, CTH shows NAD, hypertension, labs grossly WNL.     NIHSS 0 MRS 0    Review of Systems:  As states in HPI.    Allergies:  morphine (Pruritus)      PMHx:   Breast cancer  Arthritis  Hypertension  Hyperlipemia    PFHx:     PSuHx:   S/P breast lumpectomy  History of D&C    Medications:  MEDICATIONS  (STANDING):    MEDICATIONS  (PRN):    Vitals:  T(C): 36.8 (07-22-19 @ 18:00), Max: 36.8 (07-22-19 @ 18:00)  HR: 74 (07-22-19 @ 18:30) (74 - 85)  BP: 156/74 (07-22-19 @ 18:30) (156/74 - 177/67)  RR: 15 (07-22-19 @ 18:30) (15 - 16)  SpO2: 100% (07-22-19 @ 18:30) (100% - 100%)    Labs:                        11.9   9.00  )-----------( 227      ( 22 Jul 2019 18:36 )             36.9     POCT Blood Glucose.: 162 mg/dL (22 Jul 2019 18:09)  PT/INR - ( 22 Jul 2019 18:36 )   PT: 11.1 SEC;   INR: 1.00     PTT - ( 22 Jul 2019 18:36 )  PTT:26.5 SEC    PHYSICAL EXAMINATION:  General: Well-developed, well nourished, in mild distress 2/2 HA.   Eyes: Conjunctiva and sclera clear.  Neurologic:  - Mental Status:  Alert, awake, oriented to person, place, and time; Speech is fluent with intact naming, repetition, and comprehension; Good overall fund of knowledge; Immediate recall is 3/3 words and delayed recall is 3/3 words at 5 minutes; Able to spell WORLD backwards and perform serial 7 subtraction; Able to read and write a sentence.  - Cranial Nerves II-XII:    II: Blurry vision in left eye only. Right eye normal.  Visual fields are full to confrontation; Pupils are equal, round, and reactive to light;  III, IV, VI:  Extraocular movements are intact without nystagmus.  V:  Facial sensation is intact in the V1-V3 distribution bilaterally.  VII:  Face is symmetric with normal eye closure and smile  VIII:  Hearing is intact to finger rub.  IX, X:  Uvula is midline and soft palate rises symmetrically  XI:  Head turning and shoulder shrug are intact.  XII:  Tongue protudes in the midline.  - Motor:  Strength is 5/5 throughout.  There is no pronator drift.  Normal muscle bulk and tone throughout.  - Reflexes:  2+ and symmetric at the biceps, triceps, brachioradialis, knees, and ankles.  Plantar responses flexor.  - Sensory:  Intact throughout to vibration, and joint-position, light touch, pin prick.  - Coordination:  Finger-nose-finger and heel-knee-shin intact without dysmetria.  Rapid alternating hand movements intact.  - Gait:   Normal steps, base, arm swing, and turning.  Heel and toe walking are normal.  Tandem gait is normal.  Romberg testing is negative.    Radiology:  < from: CT Brain Stroke Protocol (07.22.19 @ 18:30) >  IMPRESSION:     No CT evidence of acute intracranial hemorrhage, mass effect, or midline   shift.    These findings were discussed with Dr. Peck at 7/22/2019 6:27 PM by Dr. Barger with read back confirmation.    AUBREY BARGER M.D., RADIOLOGY RESIDENT  This document has been electronically signed.  JEANNIE RICHMOND M.D., ATTENDING RADIOLOGIST  This document has been electronically signed. Jul 22 2019  6:33PM  < end of copied text >

## 2019-07-22 NOTE — ED PROVIDER NOTE - NSFOLLOWUPCLINICS_GEN_ALL_ED_FT
[facility]  Neurology  [address] [place] [address] [address] [place] [address]  Phone: [phone]  Fax:   Follow Up Time:

## 2019-07-22 NOTE — ED PROVIDER NOTE - ATTENDING CONTRIBUTION TO CARE
I performed a face to face bedside interview with patient regarding history of present illness, review of symptoms and past medical history. I completed an independent physical exam.  I have discussed patient's plan of care.   I agree with note as stated above, having amended the EMR as needed to reflect my findings. I have discussed the assessment and plan of care.  This includes during the time I functioned as the attending physician for this patient.  Attending Contribution to Care: agree with plan of resident. with sudden blurred vision to L eye since 450pm followed by L-sided head pressure. Denies paresthesias, focal weakness, speech difficulty. cta neg for acute pathology. labs wnl with neg head ct as well. vss. pt signed out to incoming team

## 2019-07-22 NOTE — CONSULT NOTE ADULT - ASSESSMENT
Impression:    Unlikely to be central ischemia in origin.   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 outpt   - telemetry monitoring  - CBC, BMP  - Lipid panel  - HbA1C  - f/u outpatient w/ Neurology @ 611 ([phone])    #Headache  - Reglan 10 IV, Benadryl 25-50 IV, Toradol 30 IV.   - f/u outpatient w/ Neurology @ 611 ([phone]) 63yo  woman with a PMHx of HTN, HLD, pre-DM, Headache (5-6/wk) presents with complaints of acute onset blurry vision and HA. Patient reports that she was watching TV without symptoms at 450pm when she noticed sudden onset blurry vision. Reports blurry vision in left eye only. In the ED, CTH shows NAD, hypertension, labs grossly WNL.     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 outpt   - telemetry monitoring  - CBC, BMP  - Lipid panel  - HbA1C  - f/u outpatient w/ Neurology @ 611 ([phone])    #Headache  - Reglan 10 IV, Benadryl 25-50 IV, Toradol 30 IV.   - f/u outpatient w/ Neurology @ 611 ([phone]) 63yo  woman with a PMHx of HTN, HLD, pre-DM, Headache (5-6/wk) presents with complaints of acute onset blurry vision and HA. Patient reports that she was watching TV without symptoms at 450pm when she noticed sudden onset blurry vision. Reports blurry vision in left eye only. In the ED, CTH shows NAD, hypertension, labs grossly WNL.     Impression:    Unlikely to be central ischemia in origin d/t lack of focality, monocular symptoms, lack of other presenting symptoms.  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 outpt   - telemetry monitoring  - CBC, BMP  - Lipid panel  - HbA1C  - f/u outpatient w/ Neurology @ 611 ([phone])    #Headache  - Reglan 10 IV, Benadryl 25-50 IV, Toradol 30 IV.   - f/u outpatient w/ Neurology @ 611 ([phone]) 63yo  woman with a PMHx of HTN, HLD, pre-DM, Headache (5-6/wk) presents with complaints of acute onset blurry vision and HA. Patient reports that she was watching TV without symptoms at 450pm when she noticed sudden onset blurry vision. Reports blurry vision in left eye only. In the ED, CTH shows NAD, hypertension, labs grossly WNL.     Impression:    Unlikely to be central ischemia in origin d/t lack of focality, monocular symptoms, lack of other presenting symptoms.  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 outpt   - telemetry monitoring  - CBC, BMP  - Lipid panel  - HbA1C  - f/u outpatient w/ Neurology @ 611 ([phone])    #Headache  - Reglan 10 IV, Benadryl 25-50 IV, Toradol 30 IV.   - f/u outpatient w/ Neurology @ 615 ([phone])

## 2019-07-22 NOTE — ED ADULT TRIAGE NOTE - CHIEF COMPLAINT QUOTE
Pt c/o sudden blurred vision to L eye since 450pm followed by L-sided head pressure and states "I feel flushed." Denies paresthesias, focal weakness, speech difficulty, Cp/ SOB.  PMH HTN HLD, cad with stents, vertigo.

## 2019-07-22 NOTE — ED PROVIDER NOTE - CLINICAL SUMMARY MEDICAL DECISION MAKING FREE TEXT BOX
64M hx L breast CA s/p lumpectomy in 2011, htn and hld, cad s/p stent presents with sudden blurred vision to L eye since 450pm followed by L-sided head pressure. Stroke code called and stroke labs in, stat CTH to r/o hemorrhagic bleed. concerned for L Occipital lobe stroke

## 2019-07-22 NOTE — CONSULT NOTE ADULT - NSHPATTENDINGPLANDISCUSS_GEN_ALL_CORE
neurology resident on phone as outlined above and will have outpatient neurology and ophthalmology follow up this week.

## 2019-07-22 NOTE — ED PROVIDER NOTE - PROGRESS NOTE DETAILS
Bedside POCUS w/o signs of retinal detachment. Patient denies eye pain, endorsing mild decreased vision to left eye but improved as compared to prior. Spoke with Ophthalmology who stated that patient can f/u tomorrow morning in clinic. Will d/c home with return precautions. will instruct to f/u with Neuro this week and Opthalmology tomorrow.   Jad Kerr MD, PGY3 Emergency Medicine Spoke with patient and family regarding need for CTA. Patient has been waiting over 3 hrs, and per CT, currently have multiple ppl ahead. Patient and family do not want to wait longer for imaging, and prefer to f/u out-pt. Spoke with family re: need for CTA, family understands risks/benefits, but desires d/c home. Will f/u this week with ophtho and neuro. Strict return precautions administered.  Jad Kerr MD, PGY3 Emergency Medicine

## 2019-07-22 NOTE — ED PROVIDER NOTE - NSFOLLOWUPINSTRUCTIONS_ED_ALL_ED_FT
Please follow up with Neurology this week (see clinic information)    Please follow up with Opthalmology tomorrow in clinic at 9:30am  ([address] [place] [address], [address]. [phone])      Return to hospital for any new or concerning symptoms, including but not limited to: fevers, chills, nausea, vomiting, headache, dizziness, lightheadedness, chest pain, shortness of breath, difficulty breathing, abdominal pain, weakness, worsening visual changes, or any other new or concerning symptoms. Please follow up with Neurology this week (see clinic information)    Please follow up with Opthalmology tomorrow in clinic at 1pm  ([address] [facility], [address]. [phone])      Return to hospital for any new or concerning symptoms, including but not limited to: fevers, chills, nausea, vomiting, headache, dizziness, lightheadedness, chest pain, shortness of breath, difficulty breathing, abdominal pain, weakness, worsening visual changes, or any other new or concerning symptoms. Please follow up with Neurology this week (see clinic information)    Please follow up with Opthalmology tomorrow in clinic at 1pm  ([address] [facility], [address]. [phone])    Return to hospital for any new or concerning symptoms, including but not limited to: fevers, chills, nausea, vomiting, headache, dizziness, lightheadedness, worsening visual changes, eye pains, weakness to extremities, chest pain, shortness of breath, difficulty breathing, abdominal pain, weakness, worsening visual changes, or any other new or concerning symptoms.

## 2019-07-22 NOTE — ED ADULT NURSE NOTE - OBJECTIVE STATEMENT
64M hx L breast CA s/p lumpectomy in 2011, htn and hld, cad s/p stent presents with sudden blurred vision to L eye since 450pm followed by L-sided head pressure. Denies weakness or change in speech. Code Stroke called, CT head negative. no focal deficiets noted on exam. Neuro at  bedside exam showed left eye visual loss. Opto to assess. NO TPA. PIV inserted, labs sent as ordered. Report given to primary RN.

## 2019-07-22 NOTE — ED ADULT NURSE REASSESSMENT NOTE - NS ED NURSE REASSESS COMMENT FT1
report received from ОЛЕГ [place], pt A&Ox4, endorses partial improvement to vision in left eye, endorses returning headache to front of head. asking for Tylenol. daughter at bedside.

## 2019-07-22 NOTE — ED PROVIDER NOTE - OBJECTIVE STATEMENT
64M hx L breast CA s/p lumpectomy in 2011, htn and hld, cad s/p stent presents with sudden blurred vision to L eye since 450pm followed by L-sided head pressure. Denies paresthesias, focal weakness, speech difficulty, Cp/ SOB. Stroke code called and Neuro at bedside.

## 2019-07-23 ENCOUNTER — APPOINTMENT (OUTPATIENT)
Dept: OPHTHALMOLOGY | Facility: CLINIC | Age: 64
End: 2019-07-23

## 2019-07-23 VITALS
DIASTOLIC BLOOD PRESSURE: 53 MMHG | OXYGEN SATURATION: 100 % | HEART RATE: 60 BPM | SYSTOLIC BLOOD PRESSURE: 138 MMHG | TEMPERATURE: 98 F | RESPIRATION RATE: 17 BRPM

## 2019-07-24 DIAGNOSIS — I34.0 NONRHEUMATIC MITRAL (VALVE) INSUFFICIENCY: ICD-10-CM

## 2019-07-29 ENCOUNTER — FORM ENCOUNTER (OUTPATIENT)
Age: 64
End: 2019-07-29

## 2019-07-30 ENCOUNTER — OUTPATIENT (OUTPATIENT)
Dept: OUTPATIENT SERVICES | Facility: HOSPITAL | Age: 64
LOS: 1 days | End: 2019-07-30
Payer: COMMERCIAL

## 2019-07-30 ENCOUNTER — APPOINTMENT (OUTPATIENT)
Dept: MRI IMAGING | Facility: CLINIC | Age: 64
End: 2019-07-30
Payer: COMMERCIAL

## 2019-07-30 DIAGNOSIS — Z98.89 OTHER SPECIFIED POSTPROCEDURAL STATES: Chronic | ICD-10-CM

## 2019-07-30 DIAGNOSIS — Z00.8 ENCOUNTER FOR OTHER GENERAL EXAMINATION: ICD-10-CM

## 2019-07-30 PROCEDURE — A9585: CPT

## 2019-07-30 PROCEDURE — 70553 MRI BRAIN STEM W/O & W/DYE: CPT | Mod: 26

## 2019-07-30 PROCEDURE — 70553 MRI BRAIN STEM W/O & W/DYE: CPT

## 2019-08-03 ENCOUNTER — FORM ENCOUNTER (OUTPATIENT)
Age: 64
End: 2019-08-03

## 2019-08-04 ENCOUNTER — OUTPATIENT (OUTPATIENT)
Dept: OUTPATIENT SERVICES | Facility: HOSPITAL | Age: 64
LOS: 1 days | End: 2019-08-04
Payer: COMMERCIAL

## 2019-08-04 ENCOUNTER — APPOINTMENT (OUTPATIENT)
Dept: MRI IMAGING | Facility: CLINIC | Age: 64
End: 2019-08-04
Payer: COMMERCIAL

## 2019-08-04 DIAGNOSIS — Z00.8 ENCOUNTER FOR OTHER GENERAL EXAMINATION: ICD-10-CM

## 2019-08-04 DIAGNOSIS — Z98.89 OTHER SPECIFIED POSTPROCEDURAL STATES: Chronic | ICD-10-CM

## 2019-08-04 PROCEDURE — 70546 MR ANGIOGRAPH HEAD W/O&W/DYE: CPT

## 2019-08-04 PROCEDURE — 70549 MR ANGIOGRAPH NECK W/O&W/DYE: CPT | Mod: 26

## 2019-08-04 PROCEDURE — A9585: CPT

## 2019-08-04 PROCEDURE — 70549 MR ANGIOGRAPH NECK W/O&W/DYE: CPT

## 2019-08-04 PROCEDURE — 70546 MR ANGIOGRAPH HEAD W/O&W/DYE: CPT | Mod: 26

## 2019-08-07 ENCOUNTER — APPOINTMENT (OUTPATIENT)
Dept: CV DIAGNOSITCS | Facility: HOSPITAL | Age: 64
End: 2019-08-07

## 2019-08-07 ENCOUNTER — OUTPATIENT (OUTPATIENT)
Dept: OUTPATIENT SERVICES | Facility: HOSPITAL | Age: 64
LOS: 1 days | End: 2019-08-07
Payer: COMMERCIAL

## 2019-08-07 DIAGNOSIS — I10 ESSENTIAL (PRIMARY) HYPERTENSION: ICD-10-CM

## 2019-08-07 DIAGNOSIS — Z98.89 OTHER SPECIFIED POSTPROCEDURAL STATES: Chronic | ICD-10-CM

## 2019-08-07 PROCEDURE — 93306 TTE W/DOPPLER COMPLETE: CPT | Mod: 26

## 2019-08-07 PROCEDURE — 93306 TTE W/DOPPLER COMPLETE: CPT

## 2019-08-08 ENCOUNTER — NON-APPOINTMENT (OUTPATIENT)
Age: 64
End: 2019-08-08

## 2019-08-08 ENCOUNTER — APPOINTMENT (OUTPATIENT)
Dept: CARDIOLOGY | Facility: CLINIC | Age: 64
End: 2019-08-08
Payer: COMMERCIAL

## 2019-08-08 VITALS
DIASTOLIC BLOOD PRESSURE: 66 MMHG | HEART RATE: 53 BPM | BODY MASS INDEX: 33.12 KG/M2 | HEIGHT: 64 IN | SYSTOLIC BLOOD PRESSURE: 103 MMHG | WEIGHT: 194 LBS | OXYGEN SATURATION: 98 %

## 2019-08-08 VITALS — HEART RATE: 57 BPM | SYSTOLIC BLOOD PRESSURE: 132 MMHG | DIASTOLIC BLOOD PRESSURE: 64 MMHG

## 2019-08-08 PROCEDURE — 93000 ELECTROCARDIOGRAM COMPLETE: CPT

## 2019-08-08 PROCEDURE — 99214 OFFICE O/P EST MOD 30 MIN: CPT | Mod: 25

## 2019-08-08 NOTE — HISTORY OF PRESENT ILLNESS
[FreeTextEntry1] : Patient is a 64 year old woman with a history of coronary artery disease status post stents to the OM1 and LAD 1/2014, HTN, hyperlipidemia, diet- controlled Diabetes mellitus, and family history of coronary artery disease who presents today for follow up of HTN. She was accompanied to the office today by her daughter, Coty, who mentions that her mom was in the ER a few weeks ago due to visual symptoms. She states that she has been following her blood pressure at home and has had only three high readings in the last 6 weeks that were in the 160s. She has otherwise had very good readings. She otherwise denies any chest pain, dyspnea, palpitations, headaches, and dizziness.

## 2019-08-08 NOTE — DISCUSSION/SUMMARY
[FreeTextEntry1] : IMPRESSION: Ms. GUILLORY is a 64 year -old woman with a  history of coronary artery disease, HTN, hyperlipidemia, type 2 Diabetes mellitus, and family history of coronary artery disease who presents today for follow up of HTN.\par \par PLAN:\par 1. She had a nuclear stress test about 11 months ago that did not reveal any infarct or ischemia. She will continue on ASA 81mg daily. She has not experienced any episodes of chest pain since her last visit with me. \par 2. Her blood pressure is good today and she states that it has been better controlled at home. For now, she will continue on Amlodipine 10 mg daily, Enalapril 20mg daily, Aldactone 25mg daily, and Toprol XL 25mg daily. She will continue to follow her blood pressure at home.  \par 3. Her goal LDL is less than 70. She will continue on diet modification.  Her LDL was most recently improved, although still not at the optimal level.\par 4. She will follow up with me in 3 months for follow up of her blood pressure or sooner should she experience any symptoms in the interim.\par 5. She will follow up with Neurology later this month for follow up of her hemianopsia.

## 2019-08-08 NOTE — PHYSICAL EXAM
[General Appearance - Well Developed] : well developed [Normal Appearance] : normal appearance [Well Groomed] : well groomed [General Appearance - Well Nourished] : well nourished [No Deformities] : no deformities [General Appearance - In No Acute Distress] : no acute distress [Normal Conjunctiva] : the conjunctiva exhibited no abnormalities [Normal Oral Mucosa] : normal oral mucosa [No Oral Pallor] : no oral pallor [No Oral Cyanosis] : no oral cyanosis [Normal Jugular Venous A Waves Present] : normal jugular venous A waves present [Normal Jugular Venous V Waves Present] : normal jugular venous V waves present [No Jugular Venous Clemons A Waves] : no jugular venous clemons A waves [Respiration, Rhythm And Depth] : normal respiratory rhythm and effort [Exaggerated Use Of Accessory Muscles For Inspiration] : no accessory muscle use [Bowel Sounds] : normal bowel sounds [Auscultation Breath Sounds / Voice Sounds] : lungs were clear to auscultation bilaterally [Abdomen Soft] : soft [Abdomen Tenderness] : non-tender [Abnormal Walk] : normal gait [Cyanosis, Localized] : no localized cyanosis [Nail Clubbing] : no clubbing of the fingernails [Skin Color & Pigmentation] : normal skin color and pigmentation [Petechial Hemorrhages (___cm)] : no petechial hemorrhages [] : no rash [Skin Lesions] : no skin lesions [Oriented To Time, Place, And Person] : oriented to person, place, and time [No Skin Ulcers] : no skin ulcer [No Anxiety] : not feeling anxious [Mood] : the mood was normal [Affect] : the affect was normal [Rhythm Regular] : regular [Normal S2] : normal S2 [Normal S1] : normal S1 [II] : a grade 2 [No Pitting Edema] : no pitting edema present [FreeTextEntry1] : Extraocular muscles intact. Anicteric sclerae. [Bradycardia] : bradycardic [S3] : no S3 [I] : a grade 1 [Right Carotid Bruit] : no bruit heard over the right carotid [Bruit] : no bruit heard [Left Carotid Bruit] : no bruit heard over the left carotid

## 2019-10-02 ENCOUNTER — APPOINTMENT (OUTPATIENT)
Dept: OPHTHALMOLOGY | Facility: CLINIC | Age: 64
End: 2019-10-02
Payer: COMMERCIAL

## 2019-10-02 ENCOUNTER — NON-APPOINTMENT (OUTPATIENT)
Age: 64
End: 2019-10-02

## 2019-10-02 PROCEDURE — 92015 DETERMINE REFRACTIVE STATE: CPT

## 2019-10-02 PROCEDURE — 92014 COMPRE OPH EXAM EST PT 1/>: CPT

## 2019-10-31 ENCOUNTER — APPOINTMENT (OUTPATIENT)
Dept: OPHTHALMOLOGY | Facility: CLINIC | Age: 64
End: 2019-10-31
Payer: COMMERCIAL

## 2019-10-31 ENCOUNTER — NON-APPOINTMENT (OUTPATIENT)
Age: 64
End: 2019-10-31

## 2019-10-31 PROCEDURE — 92012 INTRM OPH EXAM EST PATIENT: CPT

## 2019-10-31 PROCEDURE — 92083 EXTENDED VISUAL FIELD XM: CPT

## 2019-12-02 ENCOUNTER — APPOINTMENT (OUTPATIENT)
Dept: SURGERY | Facility: CLINIC | Age: 64
End: 2019-12-02
Payer: COMMERCIAL

## 2019-12-02 DIAGNOSIS — E04.1 NONTOXIC SINGLE THYROID NODULE: ICD-10-CM

## 2019-12-02 PROCEDURE — 99213 OFFICE O/P EST LOW 20 MIN: CPT

## 2019-12-02 NOTE — ASSESSMENT
[FreeTextEntry1] : Patient with right thyroid nodule. follow up ultrasound 5/2020, if stable RTO 1 year

## 2019-12-02 NOTE — PHYSICAL EXAM
[de-identified] : no cervical or supraclavicular adenopathy, trachea midline, neck short and thick, thyroid full without discreet nodule.  [Normal] : no neck adenopathy [de-identified] : Skin:  normal appearance.  no rash, nodules, vesicles, or erythema,\par Musculoskeletal:  full range of motion and no deformities appreciated\par Neurological:  grossly intact\par Psychiatric:  oriented to person, place and time with appropriate affect

## 2019-12-02 NOTE — HISTORY OF PRESENT ILLNESS
[de-identified] : Patient referred by Dr. Arellano for evaluation of right thyroid nodule. During workup for abnormality noted on chest x-ray, chest CT revealed thyroid nodule. Thyroid ultrasound August 2018: Solitary right mid nodule 19 x 13 x 16 mm. No enlarged lymph nodes. Patient denies prior thyroid history, change in voice, dysphagia or radiation exposure.\par FNA right thyroid nodule 11/2018 benign.  repeat US 5/2019 stable nodules.  no suspicious characteristics. Did not have repeat US,  blood work normal, not on thyroid medication.   denies dysphagia, hoarseness . palpitations or fatigue, or SOB

## 2019-12-05 ENCOUNTER — NON-APPOINTMENT (OUTPATIENT)
Age: 64
End: 2019-12-05

## 2019-12-05 ENCOUNTER — APPOINTMENT (OUTPATIENT)
Dept: CARDIOLOGY | Facility: CLINIC | Age: 64
End: 2019-12-05
Payer: COMMERCIAL

## 2019-12-05 VITALS — SYSTOLIC BLOOD PRESSURE: 142 MMHG | DIASTOLIC BLOOD PRESSURE: 74 MMHG

## 2019-12-05 VITALS
BODY MASS INDEX: 35.02 KG/M2 | WEIGHT: 204 LBS | SYSTOLIC BLOOD PRESSURE: 143 MMHG | DIASTOLIC BLOOD PRESSURE: 85 MMHG | HEART RATE: 60 BPM | OXYGEN SATURATION: 98 %

## 2019-12-05 PROCEDURE — 93000 ELECTROCARDIOGRAM COMPLETE: CPT

## 2019-12-05 PROCEDURE — 99214 OFFICE O/P EST MOD 30 MIN: CPT | Mod: 25

## 2019-12-05 NOTE — CARDIOLOGY SUMMARY
[___] : [unfilled] [LVEF ___%] : LVEF [unfilled]% [Mild] : mild mitral regurgitation [None] : no pulmonary hypertension

## 2020-01-06 LAB
ALBUMIN SERPL ELPH-MCNC: 4.3 G/DL
ALP BLD-CCNC: 86 U/L
ALT SERPL-CCNC: 22 U/L
ANION GAP SERPL CALC-SCNC: 13 MMOL/L
AST SERPL-CCNC: 20 U/L
BASOPHILS # BLD AUTO: 0.03 K/UL
BASOPHILS NFR BLD AUTO: 0.4 %
BILIRUB SERPL-MCNC: 0.4 MG/DL
BUN SERPL-MCNC: 10 MG/DL
CALCIUM SERPL-MCNC: 9.9 MG/DL
CHLORIDE SERPL-SCNC: 107 MMOL/L
CHOLEST SERPL-MCNC: 170 MG/DL
CHOLEST/HDLC SERPL: 3.6 RATIO
CO2 SERPL-SCNC: 25 MMOL/L
CREAT SERPL-MCNC: 0.73 MG/DL
EOSINOPHIL # BLD AUTO: 0.2 K/UL
EOSINOPHIL NFR BLD AUTO: 2.6 %
ESTIMATED AVERAGE GLUCOSE: 140 MG/DL
GLUCOSE SERPL-MCNC: 103 MG/DL
HBA1C MFR BLD HPLC: 6.5 %
HCT VFR BLD CALC: 36.8 %
HDLC SERPL-MCNC: 47 MG/DL
HGB BLD-MCNC: 12 G/DL
IMM GRANULOCYTES NFR BLD AUTO: 0.3 %
LDLC SERPL CALC-MCNC: 85 MG/DL
LYMPHOCYTES # BLD AUTO: 2.22 K/UL
LYMPHOCYTES NFR BLD AUTO: 28.5 %
MAN DIFF?: NORMAL
MCHC RBC-ENTMCNC: 29.7 PG
MCHC RBC-ENTMCNC: 32.6 GM/DL
MCV RBC AUTO: 91.1 FL
MONOCYTES # BLD AUTO: 0.48 K/UL
MONOCYTES NFR BLD AUTO: 6.2 %
NEUTROPHILS # BLD AUTO: 4.85 K/UL
NEUTROPHILS NFR BLD AUTO: 62 %
PLATELET # BLD AUTO: 241 K/UL
POTASSIUM SERPL-SCNC: 4.3 MMOL/L
PROT SERPL-MCNC: 6.9 G/DL
RBC # BLD: 4.04 M/UL
RBC # FLD: 13.1 %
SODIUM SERPL-SCNC: 145 MMOL/L
TRIGL SERPL-MCNC: 192 MG/DL
TSH SERPL-ACNC: 2.41 UIU/ML
WBC # FLD AUTO: 7.8 K/UL

## 2020-01-06 NOTE — PHYSICAL EXAM
[General Appearance - Well Developed] : well developed [Well Groomed] : well groomed [Normal Appearance] : normal appearance [General Appearance - Well Nourished] : well nourished [General Appearance - In No Acute Distress] : no acute distress [No Deformities] : no deformities [Normal Conjunctiva] : the conjunctiva exhibited no abnormalities [Normal Oral Mucosa] : normal oral mucosa [No Oral Pallor] : no oral pallor [No Oral Cyanosis] : no oral cyanosis [Normal Jugular Venous A Waves Present] : normal jugular venous A waves present [No Jugular Venous Clemons A Waves] : no jugular venous clemons A waves [Normal Jugular Venous V Waves Present] : normal jugular venous V waves present [Auscultation Breath Sounds / Voice Sounds] : lungs were clear to auscultation bilaterally [Exaggerated Use Of Accessory Muscles For Inspiration] : no accessory muscle use [Respiration, Rhythm And Depth] : normal respiratory rhythm and effort [Bowel Sounds] : normal bowel sounds [Abdomen Soft] : soft [Abnormal Walk] : normal gait [Abdomen Tenderness] : non-tender [Nail Clubbing] : no clubbing of the fingernails [Cyanosis, Localized] : no localized cyanosis [Petechial Hemorrhages (___cm)] : no petechial hemorrhages [Skin Color & Pigmentation] : normal skin color and pigmentation [No Skin Ulcers] : no skin ulcer [] : no rash [Oriented To Time, Place, And Person] : oriented to person, place, and time [Affect] : the affect was normal [No Anxiety] : not feeling anxious [Mood] : the mood was normal [Normal S1] : normal S1 [Rhythm Regular] : regular [Normal S2] : normal S2 [II] : a grade 2 [No Pitting Edema] : no pitting edema present [Normal Rate] : normal [FreeTextEntry1] : Extraocular muscles intact. Anicteric sclerae. [S3] : no S3 [Left Carotid Bruit] : no bruit heard over the left carotid [Right Carotid Bruit] : no bruit heard over the right carotid [Bruit] : no bruit heard

## 2020-01-06 NOTE — HISTORY OF PRESENT ILLNESS
[FreeTextEntry1] : Patient is a 64 year old woman with a history of coronary artery disease status post stents to the OM1 and LAD 1/2014, HTN, hyperlipidemia, diet- controlled Diabetes mellitus, and family history of coronary artery disease who presents today for follow up of HTN and coronary artery disease. She states that she has been feeling relatively well from the cardiac standpoint since her last visit with me. She has been checking her blood pressure at home and it her systolic blood pressure has consistently been less than 140 mmHg. She otherwise denies any chest pain, dyspnea, palpitations, headaches, and dizziness.

## 2020-01-06 NOTE — DISCUSSION/SUMMARY
[FreeTextEntry1] : IMPRESSION: Ms. GUILLORY is a 64 year -old woman with a  history of coronary artery disease, HTN, hyperlipidemia, type 2 Diabetes mellitus, and family history of coronary artery disease who presents today for follow up of HTN.\par \par PLAN:\par 1. She had a nuclear stress test a little over a yeat ago that did not reveal any infarct or ischemia. She will continue on ASA 81mg daily. She has not experienced any episodes of chest pain since her last visit with me. \par 2. Her blood pressure is mildly elevated, however, has been better controlled at home. I have not made any changes at this time and she will continue on Amlodipine 10 mg daily, Enalapril 20mg daily, Aldactone 25mg daily, and Toprol XL 25mg daily. She will continue to follow her blood pressure at home and understands the importance of diet modification. I will be checking a CMP today to evaluate for any adverse effects on her potassium and creatinine on her current medical regimen.  \par 3. Her goal LDL is less than 70. She will continue on diet modification.  Her LDL was most recently improved, although still not at the optimal level. I will be checking a CMP today and she will continue on Atorvastatin 20 mg daily.\par 4. She will follow up with me in 3 months for follow up of her blood pressure or sooner should she experience any symptoms in the interim.

## 2020-01-12 ENCOUNTER — EMERGENCY (EMERGENCY)
Facility: HOSPITAL | Age: 65
LOS: 1 days | Discharge: HOME | End: 2020-01-12
Attending: EMERGENCY MEDICINE
Payer: COMMERCIAL

## 2020-01-12 ENCOUNTER — EMERGENCY (EMERGENCY)
Facility: HOSPITAL | Age: 65
LOS: 1 days | Discharge: HOME | End: 2020-01-12
Attending: STUDENT IN AN ORGANIZED HEALTH CARE EDUCATION/TRAINING PROGRAM
Payer: COMMERCIAL

## 2020-01-12 VITALS
SYSTOLIC BLOOD PRESSURE: 198 MMHG | RESPIRATION RATE: 18 BRPM | DIASTOLIC BLOOD PRESSURE: 108 MMHG | OXYGEN SATURATION: 100 % | HEART RATE: 78 BPM | TEMPERATURE: 98 F

## 2020-01-12 VITALS
HEART RATE: 60 BPM | OXYGEN SATURATION: 100 % | DIASTOLIC BLOOD PRESSURE: 64 MMHG | SYSTOLIC BLOOD PRESSURE: 163 MMHG | RESPIRATION RATE: 17 BRPM

## 2020-01-12 VITALS
SYSTOLIC BLOOD PRESSURE: 138 MMHG | HEART RATE: 56 BPM | OXYGEN SATURATION: 100 % | DIASTOLIC BLOOD PRESSURE: 70 MMHG | RESPIRATION RATE: 18 BRPM

## 2020-01-12 VITALS
DIASTOLIC BLOOD PRESSURE: 52 MMHG | TEMPERATURE: 98 F | SYSTOLIC BLOOD PRESSURE: 164 MMHG | OXYGEN SATURATION: 100 % | HEART RATE: 64 BPM | HEIGHT: 62 IN | RESPIRATION RATE: 18 BRPM

## 2020-01-12 DIAGNOSIS — Z98.89 OTHER SPECIFIED POSTPROCEDURAL STATES: Chronic | ICD-10-CM

## 2020-01-12 LAB
ALBUMIN SERPL ELPH-MCNC: 4.5 G/DL — SIGNIFICANT CHANGE UP (ref 3.3–5)
ALP SERPL-CCNC: 97 U/L — SIGNIFICANT CHANGE UP (ref 40–120)
ALT FLD-CCNC: 23 U/L — SIGNIFICANT CHANGE UP (ref 4–33)
ANION GAP SERPL CALC-SCNC: 16 MMO/L — HIGH (ref 7–14)
APPEARANCE UR: CLEAR — SIGNIFICANT CHANGE UP
APTT BLD: 26 SEC — LOW (ref 27.5–36.3)
AST SERPL-CCNC: 22 U/L — SIGNIFICANT CHANGE UP (ref 4–32)
BASE EXCESS BLDV CALC-SCNC: -2 MMOL/L — SIGNIFICANT CHANGE UP
BASOPHILS # BLD AUTO: 0.02 K/UL — SIGNIFICANT CHANGE UP (ref 0–0.2)
BASOPHILS NFR BLD AUTO: 0.3 % — SIGNIFICANT CHANGE UP (ref 0–2)
BILIRUB SERPL-MCNC: 0.3 MG/DL — SIGNIFICANT CHANGE UP (ref 0.2–1.2)
BILIRUB UR-MCNC: NEGATIVE — SIGNIFICANT CHANGE UP
BLOOD GAS VENOUS - CREATININE: 0.62 MG/DL — SIGNIFICANT CHANGE UP (ref 0.5–1.3)
BLOOD UR QL VISUAL: NEGATIVE — SIGNIFICANT CHANGE UP
BUN SERPL-MCNC: 11 MG/DL — SIGNIFICANT CHANGE UP (ref 7–23)
CALCIUM SERPL-MCNC: 9.5 MG/DL — SIGNIFICANT CHANGE UP (ref 8.4–10.5)
CHLORIDE BLDV-SCNC: 109 MMOL/L — HIGH (ref 96–108)
CHLORIDE SERPL-SCNC: 102 MMOL/L — SIGNIFICANT CHANGE UP (ref 98–107)
CO2 SERPL-SCNC: 20 MMOL/L — LOW (ref 22–31)
COLOR SPEC: SIGNIFICANT CHANGE UP
CREAT SERPL-MCNC: 0.6 MG/DL — SIGNIFICANT CHANGE UP (ref 0.5–1.3)
EOSINOPHIL # BLD AUTO: 0.16 K/UL — SIGNIFICANT CHANGE UP (ref 0–0.5)
EOSINOPHIL NFR BLD AUTO: 2.7 % — SIGNIFICANT CHANGE UP (ref 0–6)
FLU A RESULT: NOT DETECTED — SIGNIFICANT CHANGE UP
FLU A RESULT: NOT DETECTED — SIGNIFICANT CHANGE UP
FLUAV AG NPH QL: NOT DETECTED — SIGNIFICANT CHANGE UP
FLUBV AG NPH QL: NOT DETECTED — SIGNIFICANT CHANGE UP
GAS PNL BLDV: 134 MMOL/L — LOW (ref 136–146)
GLUCOSE BLDV-MCNC: 193 MG/DL — HIGH (ref 70–99)
GLUCOSE SERPL-MCNC: 196 MG/DL — HIGH (ref 70–99)
GLUCOSE UR-MCNC: NEGATIVE — SIGNIFICANT CHANGE UP
HCO3 BLDV-SCNC: 23 MMOL/L — SIGNIFICANT CHANGE UP (ref 20–27)
HCT VFR BLD CALC: 38.8 % — SIGNIFICANT CHANGE UP (ref 34.5–45)
HCT VFR BLDV CALC: 39.5 % — SIGNIFICANT CHANGE UP (ref 34.5–45)
HGB BLD-MCNC: 12.8 G/DL — SIGNIFICANT CHANGE UP (ref 11.5–15.5)
HGB BLDV-MCNC: 12.8 G/DL — SIGNIFICANT CHANGE UP (ref 11.5–15.5)
IMM GRANULOCYTES NFR BLD AUTO: 0.3 % — SIGNIFICANT CHANGE UP (ref 0–1.5)
INR BLD: 1.02 — SIGNIFICANT CHANGE UP (ref 0.88–1.17)
KETONES UR-MCNC: NEGATIVE — SIGNIFICANT CHANGE UP
LACTATE BLDV-MCNC: 2.7 MMOL/L — HIGH (ref 0.5–2)
LEUKOCYTE ESTERASE UR-ACNC: NEGATIVE — SIGNIFICANT CHANGE UP
LYMPHOCYTES # BLD AUTO: 1.1 K/UL — SIGNIFICANT CHANGE UP (ref 1–3.3)
LYMPHOCYTES # BLD AUTO: 18.7 % — SIGNIFICANT CHANGE UP (ref 13–44)
MAGNESIUM SERPL-MCNC: 1.8 MG/DL — SIGNIFICANT CHANGE UP (ref 1.6–2.6)
MCHC RBC-ENTMCNC: 28.7 PG — SIGNIFICANT CHANGE UP (ref 27–34)
MCHC RBC-ENTMCNC: 33 % — SIGNIFICANT CHANGE UP (ref 32–36)
MCV RBC AUTO: 87 FL — SIGNIFICANT CHANGE UP (ref 80–100)
MONOCYTES # BLD AUTO: 0.35 K/UL — SIGNIFICANT CHANGE UP (ref 0–0.9)
MONOCYTES NFR BLD AUTO: 5.9 % — SIGNIFICANT CHANGE UP (ref 2–14)
NEUTROPHILS # BLD AUTO: 4.24 K/UL — SIGNIFICANT CHANGE UP (ref 1.8–7.4)
NEUTROPHILS NFR BLD AUTO: 72.1 % — SIGNIFICANT CHANGE UP (ref 43–77)
NITRITE UR-MCNC: NEGATIVE — SIGNIFICANT CHANGE UP
NRBC # FLD: 0 K/UL — SIGNIFICANT CHANGE UP (ref 0–0)
PCO2 BLDV: 37 MMHG — LOW (ref 41–51)
PH BLDV: 7.4 PH — SIGNIFICANT CHANGE UP (ref 7.32–7.43)
PH UR: 6 — SIGNIFICANT CHANGE UP (ref 5–8)
PHOSPHATE SERPL-MCNC: 3.5 MG/DL — SIGNIFICANT CHANGE UP (ref 2.5–4.5)
PLATELET # BLD AUTO: 226 K/UL — SIGNIFICANT CHANGE UP (ref 150–400)
PMV BLD: 10.5 FL — SIGNIFICANT CHANGE UP (ref 7–13)
PO2 BLDV: 46 MMHG — HIGH (ref 35–40)
POTASSIUM BLDV-SCNC: 3.9 MMOL/L — SIGNIFICANT CHANGE UP (ref 3.4–4.5)
POTASSIUM SERPL-MCNC: 4.2 MMOL/L — SIGNIFICANT CHANGE UP (ref 3.5–5.3)
POTASSIUM SERPL-SCNC: 4.2 MMOL/L — SIGNIFICANT CHANGE UP (ref 3.5–5.3)
PROT SERPL-MCNC: 7.8 G/DL — SIGNIFICANT CHANGE UP (ref 6–8.3)
PROT UR-MCNC: NEGATIVE — SIGNIFICANT CHANGE UP
PROTHROM AB SERPL-ACNC: 11.6 SEC — SIGNIFICANT CHANGE UP (ref 9.8–13.1)
RBC # BLD: 4.46 M/UL — SIGNIFICANT CHANGE UP (ref 3.8–5.2)
RBC # FLD: 12.7 % — SIGNIFICANT CHANGE UP (ref 10.3–14.5)
RSV RESULT: SIGNIFICANT CHANGE UP
RSV RNA RESP QL NAA+PROBE: SIGNIFICANT CHANGE UP
SAO2 % BLDV: 79.6 % — SIGNIFICANT CHANGE UP (ref 60–85)
SODIUM SERPL-SCNC: 138 MMOL/L — SIGNIFICANT CHANGE UP (ref 135–145)
SP GR SPEC: 1.01 — SIGNIFICANT CHANGE UP (ref 1–1.04)
UROBILINOGEN FLD QL: NORMAL — SIGNIFICANT CHANGE UP
WBC # BLD: 5.89 K/UL — SIGNIFICANT CHANGE UP (ref 3.8–10.5)
WBC # FLD AUTO: 5.89 K/UL — SIGNIFICANT CHANGE UP (ref 3.8–10.5)

## 2020-01-12 PROCEDURE — 99285 EMERGENCY DEPT VISIT HI MDM: CPT

## 2020-01-12 PROCEDURE — 99283 EMERGENCY DEPT VISIT LOW MDM: CPT

## 2020-01-12 PROCEDURE — 71046 X-RAY EXAM CHEST 2 VIEWS: CPT | Mod: 26

## 2020-01-12 PROCEDURE — 74177 CT ABD & PELVIS W/CONTRAST: CPT | Mod: 26

## 2020-01-12 RX ORDER — ACETAMINOPHEN 500 MG
325 TABLET ORAL ONCE
Refills: 0 | Status: COMPLETED | OUTPATIENT
Start: 2020-01-12 | End: 2020-01-12

## 2020-01-12 RX ORDER — HYDROMORPHONE HYDROCHLORIDE 2 MG/ML
0.5 INJECTION INTRAMUSCULAR; INTRAVENOUS; SUBCUTANEOUS ONCE
Refills: 0 | Status: DISCONTINUED | OUTPATIENT
Start: 2020-01-12 | End: 2020-01-12

## 2020-01-12 RX ORDER — SODIUM CHLORIDE 9 MG/ML
1000 INJECTION INTRAMUSCULAR; INTRAVENOUS; SUBCUTANEOUS ONCE
Refills: 0 | Status: COMPLETED | OUTPATIENT
Start: 2020-01-12 | End: 2020-01-12

## 2020-01-12 RX ORDER — OXYCODONE HYDROCHLORIDE 5 MG/1
5 TABLET ORAL ONCE
Refills: 0 | Status: DISCONTINUED | OUTPATIENT
Start: 2020-01-12 | End: 2020-01-12

## 2020-01-12 RX ORDER — ONDANSETRON 8 MG/1
4 TABLET, FILM COATED ORAL ONCE
Refills: 0 | Status: COMPLETED | OUTPATIENT
Start: 2020-01-12 | End: 2020-01-12

## 2020-01-12 RX ORDER — TAMSULOSIN HYDROCHLORIDE 0.4 MG/1
1 CAPSULE ORAL
Qty: 7 | Refills: 0
Start: 2020-01-12 | End: 2020-01-18

## 2020-01-12 RX ORDER — KETOROLAC TROMETHAMINE 30 MG/ML
15 SYRINGE (ML) INJECTION ONCE
Refills: 0 | Status: DISCONTINUED | OUTPATIENT
Start: 2020-01-12 | End: 2020-01-12

## 2020-01-12 RX ADMIN — ONDANSETRON 4 MILLIGRAM(S): 8 TABLET, FILM COATED ORAL at 09:30

## 2020-01-12 RX ADMIN — OXYCODONE HYDROCHLORIDE 5 MILLIGRAM(S): 5 TABLET ORAL at 23:24

## 2020-01-12 RX ADMIN — Medication 325 MILLIGRAM(S): at 23:24

## 2020-01-12 RX ADMIN — SODIUM CHLORIDE 1000 MILLILITER(S): 9 INJECTION INTRAMUSCULAR; INTRAVENOUS; SUBCUTANEOUS at 11:59

## 2020-01-12 RX ADMIN — Medication 15 MILLIGRAM(S): at 23:25

## 2020-01-12 RX ADMIN — SODIUM CHLORIDE 1000 MILLILITER(S): 9 INJECTION INTRAMUSCULAR; INTRAVENOUS; SUBCUTANEOUS at 09:50

## 2020-01-12 RX ADMIN — ONDANSETRON 4 MILLIGRAM(S): 8 TABLET, FILM COATED ORAL at 23:24

## 2020-01-12 RX ADMIN — HYDROMORPHONE HYDROCHLORIDE 0.5 MILLIGRAM(S): 2 INJECTION INTRAMUSCULAR; INTRAVENOUS; SUBCUTANEOUS at 09:30

## 2020-01-12 RX ADMIN — HYDROMORPHONE HYDROCHLORIDE 0.5 MILLIGRAM(S): 2 INJECTION INTRAMUSCULAR; INTRAVENOUS; SUBCUTANEOUS at 10:00

## 2020-01-12 NOTE — ED PROVIDER NOTE - CARE PROVIDER_API CALL
Maude Arellano)  Internal Medicine  [address], [address]  [place] [address]  Phone: [phone]  Fax: [phone]  Follow Up Time: 7-10 Days

## 2020-01-12 NOTE — ED PROVIDER NOTE - OBJECTIVE STATEMENT
64M hx L breast CA s/p lumpectomy in 2011, htn and hld, cad s/p stent presents with right flank pain. The patient states that she was in her usual state of health up until night prior to presentation when she started to have right lower flank pain. The patient states that the pain was not alleviated with naproxen or tylenol. She denies any radiation of pain or associated hematuria/dysuria. The patient denies any associated f/c/n/v/d/c.

## 2020-01-12 NOTE — ED ADULT TRIAGE NOTE - CHIEF COMPLAINT QUOTE
multiple complaints    c/o lower back pain, congestion, fever (tmax 102), sob, nauseated.  took naproxen 500mg at 12midnight.  has 2 stents, htn, hi chol.  states bp was high this am (160/80)

## 2020-01-12 NOTE — ED PROVIDER NOTE - PROGRESS NOTE DETAILS
[facility] EM/IM PGY2: CT showing small R renal stone. Pt received dilaudid IV with good pain control, however became dizzy with BP in low 100s, given 1L IVF bolus x2 with improvement in BP and dizziness. Pt will be discharged home with symptomatic management of kidney stone. Pt verbalized understanding and agrees with these results and plan.

## 2020-01-12 NOTE — ED PROVIDER NOTE - PATIENT PORTAL LINK FT
You can access the FollowMyHealth Patient Portal offered by [facility] by registering at the following website: [email or web address]. By joining [employer]’s FollowMyHealth portal, you will also be able to view your health information using other applications (apps) compatible with our system. You can access the FollowMyHealth Patient Portal offered by [facility] by registering at the following website: [email or web address]. By joining [employer]’s FollowMyHealth portal, you will also be able to view your health information using other applications (apps) compatible with our system.

## 2020-01-12 NOTE — ED PROVIDER NOTE - NS ED ROS FT
ROS:  GENERAL: No fever, no chills  EYES: no change in vision  HEENT: no trouble swallowing, no trouble speaking  CARDIAC: no chest pain  PULMONARY: no cough, no shortness of breath  GI: no abdominal pain, no nausea, no vomiting, no diarrhea, no constipation  : No dysuria, no frequency, no change in appearance, or odor of urine  SKIN: no rashes  NEURO: no headache, no weakness  MSK: Right flank pain

## 2020-01-12 NOTE — ED ADULT NURSE NOTE - OBJECTIVE STATEMENT
Patient c/o pain in back since Friday, worsening yesterday. Patient also c/o dizziness. Patient aox4 and ambulatory. Assistance needed due to dizziness. Patient evaluated by medical team. Labs drawn and sent. will continue to monitor. IV placed to right hand, 20g. IV patent and intact. No s/s of infiltration noted.

## 2020-01-12 NOTE — ED PROVIDER NOTE - ATTENDING CONTRIBUTION TO CARE
64M hx L breast CA s/p lumpectomy in 2011, htn and hld, cad s/p stent presents with signs and symptoms concerning for possible nephrolithiasis. Dispo pending results, CT and dispo pending results.     ROBBIE Parisi: I have personally performed a face to face bedside history and physical examination of this patient. I have discussed the history, examination, review of systems, assessment and plan of management with the resident. I have reviewed the electronic medical record and amended it to reflect my history, review of systems, physical exam, assessment and plan.

## 2020-01-12 NOTE — ED PROVIDER NOTE - NSFOLLOWUPINSTRUCTIONS_ED_ALL_ED_FT
You were seen in the ER for right flank pain, we obtained a CT scan that showed a small right kidney stone. There was no evidence of any infection on your labwork or urine analysis.    Kidney stones (urolithiasis) are crystal deposits that form inside your kidneys. Pain is caused by the stone moving through the urinary tract, causing spasms of the ureter. Drink enough water and fluids to keep your urine clear or pale yellow. You will be discharged home with medication for pain and to help dilate your urinary tract to help pass the stone (flomax 0.4mg once per day for one week).    SEEK IMMEDIATE MEDICAL CARE IF YOU HAVE ANY OF THE FOLLOWING SYMPTOMS: pain not controlled with medication, fever/chills, worsening nausea and/or vomiting, inability to urinate, dizziness/lightheadedness, or any other new or concerning symptoms. You were seen in the ER for right flank pain, we obtained a CT scan that showed a small right kidney stone. There was no evidence of any infection on your labwork or urine analysis.    Kidney stones (urolithiasis) are crystal deposits that form inside your kidneys. Pain is caused by the stone moving through the urinary tract, causing spasms of the ureter. Drink enough water and fluids to keep your urine clear or pale yellow. You will be discharged home with medication for pain (naproxen 500mg twice per day as needed for mild/moderate pain, percocet 5/325mg once per day as needed for severe pain) and to help dilate your urinary tract to help pass the stone (flomax 0.4mg once per day for one week).    SEEK IMMEDIATE MEDICAL CARE IF YOU HAVE ANY OF THE FOLLOWING SYMPTOMS: pain not controlled with medication, fever/chills, worsening nausea and/or vomiting, inability to urinate, dizziness/lightheadedness, or any other new or concerning symptoms.

## 2020-01-12 NOTE — ED ADULT NURSE NOTE - OBJECTIVE STATEMENT
Mauri RN- Received Pt in room 9. Pt A&OX3, ambulatory, daughter at bedside. Pt was seen here this morning, diagnosed with kidney stone, pt was suppose to get pain medication from pharmacy but they did not fill the prescription. Pt now c/o 10/10 abdominal and back pain acc with n/v and dizziness. Pt appears uncomfortable, guarding abdomen, but in no apparent distress. vitals stable as noted. Respirations are equal and nonlabored, no respiratory distress noted. sating 100% on room air. Abdomen soft, nontender to touch, no distention noted. denies any other complaints at this time, denies chest pain, sob, diarrhea, fever/chills, cough, urinary complications. pt stable at this time. Will endorse report to Primary RN Tennille for further plan

## 2020-01-12 NOTE — ED ADULT NURSE NOTE - NSIMPLEMENTINTERV_GEN_ALL_ED
Implemented All Universal Safety Interventions:  [place] to call system. Call bell, personal items and telephone within reach. Instruct patient to call for assistance. Room bathroom lighting operational. Non-slip footwear when patient is off stretcher. Physically safe environment: no spills, clutter or unnecessary equipment. Stretcher in lowest position, wheels locked, appropriate side rails in place.

## 2020-01-12 NOTE — ED ADULT TRIAGE NOTE - CHIEF COMPLAINT QUOTE
Pt. c/o abdominal and back pain acc with n/v and dizziness. Pt. was diagnosed with kidney stones today.  Pt. was discharged at 2pm but pain has gotten worst. As per daughter she was unable to get prescription filled for percocet. Pt. appears in a lot of discomfort.

## 2020-01-12 NOTE — ED PROVIDER NOTE - PHYSICAL EXAMINATION
A & O x 3, NAD, HEENT WNL and no facial asymmetry; lungs CTAB, heart with reg rhythm without murmur; abdomen soft NTND; extremities with no edema; neuro exam non focal with no motor or sensory deficits.  Right flank pain, negative straight leg raise.

## 2020-01-12 NOTE — ED ADULT NURSE REASSESSMENT NOTE - NS ED NURSE REASSESS COMMENT FT1
Heart rate low after dilaudid 0.5 ivp administered. Doctor Isak made aware. Patient on cardiac monitor. HR 50-58bpm. Will continue to monitor. bp 140/62

## 2020-01-12 NOTE — ED ADULT NURSE NOTE - TEMPLATE LIST FOR HEAD TO TOE ASSESSMENT
Patient discharge instructions given to patient's father. Patient's father able to verbalize understanding. Patient ambulatory from ED to home with parents.
Abdominal Pain, N/V/D

## 2020-01-13 LAB — SPECIMEN SOURCE: SIGNIFICANT CHANGE UP

## 2020-01-13 RX ORDER — ONDANSETRON 8 MG/1
1 TABLET, FILM COATED ORAL
Qty: 4 | Refills: 0
Start: 2020-01-13 | End: 2020-01-14

## 2020-01-13 NOTE — ED PROVIDER NOTE - NS ED ROS FT
CONSTITUTIONAL: No fevers, chills, fatigue, weakness  CV: No chest pain, palpitations  PULM: No cough, shortness of breath  GI: ABDOMINAL PAIN, NAUSEA, VOMITING. No diarrhea, constipation  : No dysuria, hematuria, polyuria  SKIN: No rashes, swelling  MSK: RIGHT LOWER BACK PAIN CONSTITUTIONAL: No fevers, chills, fatigue, weakness  CV: No chest pain, palpitations  PULM: No cough, shortness of breath  GI: ABDOMINAL PAIN, NAUSEA, VOMITING. No diarrhea, constipation  : No dysuria, hematuria, polyuria  SKIN: No rashes, swelling  MSK: RIGHT LOWER BACK PAIN  Neuro: normal gait

## 2020-01-13 NOTE — ED PROVIDER NOTE - ATTENDING CONTRIBUTION TO CARE
I performed a history and physical exam of the patient and discussed their management with the resident and /or advanced care provider. I reviewed the resident and /or ACP's note and agree with the documented findings and plan of care. My medical decison making and observations are found above.    I have supervised and agree with the above resident history, physical and plan with the noted differences.    eun: 64 yo seen earlier today, dx with non obstructing renal stoone.  sx are the same, pt did not have pain med script filled.  lungs clear, abd soft no r/g, no fever

## 2020-01-13 NOTE — ED PROVIDER NOTE - PATIENT PORTAL LINK FT
You can access the FollowMyHealth Patient Portal offered by [facility] by registering at the following website: [email or web address]. By joining [employer]’s FollowMyHealth portal, you will also be able to view your health information using other applications (apps) compatible with our system.

## 2020-01-13 NOTE — ED PROVIDER NOTE - NSFOLLOWUPINSTRUCTIONS_ED_ALL_ED_FT
-You were seen in the Emergency Department (ED) for ABDOMINAL PAIN. Lab and imaging results, if performed, were discussed with you along with your discharge diagnosis.    MEDICATIONS:  -You are prescribed PEROCET. Please take as directed by your pharmacists.   -Continue all other prescribed medicine, IF ANY, as per your primary care doctor's (PMD) recommendations.    PAIN CONTROL:  -Please take over the counter Tylenol (also known as acetaminophen) 650mg every 6 hours or Ibuprofen (also known as motrin, advil) 400mg every 6 hour for your pain, IF ANY, unless you are not supposed to for any reason.  -Rest, stay hydrated with plenty of fluids (drink at least 2 Liters or 64 Ounces of water each day UNLESS you are supposed to restrict fluids or ANY reason.    FOLLOW-UP:  -Please follow up with your private physician within the next 72 hours. Tell them you were recently in the ED for an urgent issue and would like to be seen. Bring copies of your results if you were given.   -If you do not have a PMD, please call [phone] to find one convenient for you or call our clinic at [phone].    RETURN PRECAUTIONS:  -Please return to the Emergency Department if you experience ANY new or concerning symptoms, such as, but not limited to: worsening pain, large amount of bleeding, passing out, fever >100.4F, shaking chills, inability to see or new double vision, chest pain, difficulty breathing, diffuse abdominal pain, unable to eat or drink, continuous vomiting or diarrhea, unable to move or feel part of your body      Thank you for choosing a [facility] ED.

## 2020-01-13 NOTE — ED PROVIDER NOTE - CLINICAL SUMMARY MEDICAL DECISION MAKING FREE TEXT BOX
65 y.o. f here for ongoing pain from kidney stone d/t not having pain med script filled. Will treat pain and reassess. Low suspicion for stone obstructing given still making normal amount of urine as per patient. 65 y.o. f here for ongoing pain from kidney stone d/t not having pain med script filled. Will treat pain and reassess.    eun: 66 yo seen earlier today, dx with non obstructing renal stoone.  sx are the same, pt did not have pain med script filled.  lungs clear, abd soft no r/g, no fever

## 2020-01-13 NOTE — ED PROVIDER NOTE - OBJECTIVE STATEMENT
65 y.o. f here for ongoing right flank pain with some radiation to the RLQ, seen earlier, found to have a nonobstructing kidney stone. Had percocet sent to pharmacy but patient did not  because script was not filled. Endorses some nausea and vomiting since leaving. Continuing to make urine, nonbloody. Denies CP, SOB, leg pain, vaginal bleeding.

## 2020-01-13 NOTE — ED PROVIDER NOTE - PHYSICAL EXAMINATION
GENERAL: elderly female, lying in bed, in pain. Vital signs are within normal limits  HEENT: moist mucous membranes  LUNG: CTAB, no w/r/r appreciated, good respiratory effort  CV: RRR, no m/r/g appreciated, Pulses- Radial: 2+ b/l  ABDOMEN: Soft, NTND despite endorsing abdominal pain RLQ., no rebound or guarding  BACK: R CVA TENDERNESS  MSK: No edema, no visible deformities  SKIN: Warm, dry, well perfused, no evidence of rash

## 2020-01-14 LAB — BACTERIA UR CULT: SIGNIFICANT CHANGE UP

## 2020-01-14 NOTE — ED POST DISCHARGE NOTE - RESULT SUMMARY
UCX: NL genitourianry enoch 10,000-49,000CFU/ml . Patient with a HX of kidney stones. Patient contact # [phone] discussed with patient needs to follow up with MD for repeat UA/UCX. Patient states she is still in pain. Discussed with patient either return to ED for re-evaluation' of follow up with MD. Patient verbalizes understanding of what has been told to her.

## 2020-01-21 ENCOUNTER — FORM ENCOUNTER (OUTPATIENT)
Age: 65
End: 2020-01-21

## 2020-01-22 ENCOUNTER — OUTPATIENT (OUTPATIENT)
Dept: OUTPATIENT SERVICES | Facility: HOSPITAL | Age: 65
LOS: 1 days | End: 2020-01-22
Payer: COMMERCIAL

## 2020-01-22 ENCOUNTER — APPOINTMENT (OUTPATIENT)
Dept: MRI IMAGING | Facility: CLINIC | Age: 65
End: 2020-01-22
Payer: COMMERCIAL

## 2020-01-22 DIAGNOSIS — Z00.8 ENCOUNTER FOR OTHER GENERAL EXAMINATION: ICD-10-CM

## 2020-01-22 DIAGNOSIS — Z98.89 OTHER SPECIFIED POSTPROCEDURAL STATES: Chronic | ICD-10-CM

## 2020-01-22 PROCEDURE — 72148 MRI LUMBAR SPINE W/O DYE: CPT

## 2020-01-22 PROCEDURE — 72148 MRI LUMBAR SPINE W/O DYE: CPT | Mod: 26

## 2020-01-31 ENCOUNTER — OUTPATIENT (OUTPATIENT)
Dept: OUTPATIENT SERVICES | Facility: HOSPITAL | Age: 65
LOS: 1 days | End: 2020-01-31
Payer: COMMERCIAL

## 2020-01-31 ENCOUNTER — APPOINTMENT (OUTPATIENT)
Dept: MRI IMAGING | Facility: CLINIC | Age: 65
End: 2020-01-31
Payer: COMMERCIAL

## 2020-01-31 DIAGNOSIS — Z98.89 OTHER SPECIFIED POSTPROCEDURAL STATES: Chronic | ICD-10-CM

## 2020-01-31 DIAGNOSIS — Z00.8 ENCOUNTER FOR OTHER GENERAL EXAMINATION: ICD-10-CM

## 2020-01-31 DIAGNOSIS — M54.6 PAIN IN THORACIC SPINE: ICD-10-CM

## 2020-01-31 PROCEDURE — 72146 MRI CHEST SPINE W/O DYE: CPT | Mod: 26

## 2020-01-31 PROCEDURE — 72146 MRI CHEST SPINE W/O DYE: CPT

## 2020-02-03 ENCOUNTER — FORM ENCOUNTER (OUTPATIENT)
Age: 65
End: 2020-02-03

## 2020-02-04 ENCOUNTER — APPOINTMENT (OUTPATIENT)
Dept: CT IMAGING | Facility: IMAGING CENTER | Age: 65
End: 2020-02-04
Payer: COMMERCIAL

## 2020-02-04 ENCOUNTER — OUTPATIENT (OUTPATIENT)
Dept: OUTPATIENT SERVICES | Facility: HOSPITAL | Age: 65
LOS: 1 days | End: 2020-02-04
Payer: COMMERCIAL

## 2020-02-04 DIAGNOSIS — Z00.8 ENCOUNTER FOR OTHER GENERAL EXAMINATION: ICD-10-CM

## 2020-02-04 DIAGNOSIS — Z98.89 OTHER SPECIFIED POSTPROCEDURAL STATES: Chronic | ICD-10-CM

## 2020-02-04 PROCEDURE — 71260 CT THORAX DX C+: CPT

## 2020-02-04 PROCEDURE — 71260 CT THORAX DX C+: CPT | Mod: 26

## 2020-02-04 PROCEDURE — 70491 CT SOFT TISSUE NECK W/DYE: CPT | Mod: 26

## 2020-02-04 PROCEDURE — 70491 CT SOFT TISSUE NECK W/DYE: CPT

## 2020-02-10 ENCOUNTER — APPOINTMENT (OUTPATIENT)
Dept: PULMONOLOGY | Facility: CLINIC | Age: 65
End: 2020-02-10

## 2020-02-11 ENCOUNTER — APPOINTMENT (OUTPATIENT)
Dept: PULMONOLOGY | Facility: CLINIC | Age: 65
End: 2020-02-11
Payer: COMMERCIAL

## 2020-02-11 VITALS — WEIGHT: 195 LBS | BODY MASS INDEX: 38.28 KG/M2 | HEIGHT: 60 IN | HEART RATE: 74 BPM | OXYGEN SATURATION: 98 %

## 2020-02-11 VITALS
DIASTOLIC BLOOD PRESSURE: 81 MMHG | WEIGHT: 195 LBS | OXYGEN SATURATION: 99 % | TEMPERATURE: 98.4 F | HEIGHT: 60 IN | HEART RATE: 74 BPM | SYSTOLIC BLOOD PRESSURE: 135 MMHG | RESPIRATION RATE: 16 BRPM | BODY MASS INDEX: 38.28 KG/M2

## 2020-02-11 DIAGNOSIS — Z86.000 PERSONAL HISTORY OF IN-SITU NEOPLASM OF BREAST: ICD-10-CM

## 2020-02-11 DIAGNOSIS — Z85.3 PERSONAL HISTORY OF MALIGNANT NEOPLASM OF BREAST: ICD-10-CM

## 2020-02-11 PROCEDURE — 94726 PLETHYSMOGRAPHY LUNG VOLUMES: CPT

## 2020-02-11 PROCEDURE — 94729 DIFFUSING CAPACITY: CPT

## 2020-02-11 PROCEDURE — 94060 EVALUATION OF WHEEZING: CPT

## 2020-02-11 PROCEDURE — 99203 OFFICE O/P NEW LOW 30 MIN: CPT | Mod: 25

## 2020-02-11 PROCEDURE — ZZZZZ: CPT

## 2020-02-11 NOTE — ASSESSMENT
[FreeTextEntry1] : the CAT scan demonstrates a soft tissue mass or lymph node enlargement in the pre-and right paratracheal area. Whether this is connected to her recent flank discomfort is unclear but I suggested that this lesion be biopsied. Arrangements will be made for her to undergo EBUS exam next week. She will stop her aspirin.

## 2020-02-11 NOTE — HISTORY OF PRESENT ILLNESS
[TextBox_4] : 65-year-old female with an abnormal chest CT. The patient developed lumbosacral and right flank discomfort associated with paresthesias of her left thigh. She was felt to have a renal stone but because the pain persisted she was seen by neurology. The patient underwent MRI imaging of her back and eventually had a neck and chest CT which demonstrated a right paratracheal mass (lymph nodes). She is a nonsmoker with a history of breast cancer.(lumpectomy). . She also has coronary stent placement and significant hypertension.

## 2020-02-11 NOTE — REASON FOR VISIT
[Consultation] : a consultation [Abnormal CXR/ Chest CT] : an abnormal CXR/ chest CT [Family Member] : family member

## 2020-02-11 NOTE — PHYSICAL EXAM
[No Acute Distress] : no acute distress [Normal Appearance] : normal appearance [No Neck Mass] : no neck mass [Normal Rate/Rhythm] : normal rate/rhythm [No Resp Distress] : no resp distress [Normal Gait] : normal gait [Clear to Auscultation Bilaterally] : clear to auscultation bilaterally [No Clubbing] : no clubbing [No Edema] : no edema [TextBox_54] : 2/6 systolic regurgitant murmur left sternal border

## 2020-02-11 NOTE — REVIEW OF SYSTEMS
[Negative] : Gastrointestinal [TextBox_30] : as in history of present illness [TextBox_83] : as in history of present illness [TextBox_44] : as in history of present illness [TextBox_94] : as in history of present illness [TextBox_122] : as in history of present illness

## 2020-02-13 ENCOUNTER — OUTPATIENT (OUTPATIENT)
Dept: OUTPATIENT SERVICES | Facility: HOSPITAL | Age: 65
LOS: 1 days | End: 2020-02-13

## 2020-02-13 ENCOUNTER — NON-APPOINTMENT (OUTPATIENT)
Age: 65
End: 2020-02-13

## 2020-02-13 ENCOUNTER — APPOINTMENT (OUTPATIENT)
Dept: CARDIOLOGY | Facility: CLINIC | Age: 65
End: 2020-02-13
Payer: COMMERCIAL

## 2020-02-13 VITALS
HEART RATE: 73 BPM | DIASTOLIC BLOOD PRESSURE: 68 MMHG | SYSTOLIC BLOOD PRESSURE: 124 MMHG | RESPIRATION RATE: 12 BRPM | TEMPERATURE: 98.3 F | OXYGEN SATURATION: 97 % | WEIGHT: 199 LBS | BODY MASS INDEX: 39.07 KG/M2 | HEIGHT: 60 IN

## 2020-02-13 VITALS
TEMPERATURE: 98 F | OXYGEN SATURATION: 98 % | DIASTOLIC BLOOD PRESSURE: 62 MMHG | HEART RATE: 63 BPM | HEIGHT: 62 IN | RESPIRATION RATE: 16 BRPM | WEIGHT: 195.11 LBS | SYSTOLIC BLOOD PRESSURE: 110 MMHG

## 2020-02-13 DIAGNOSIS — Z98.89 OTHER SPECIFIED POSTPROCEDURAL STATES: Chronic | ICD-10-CM

## 2020-02-13 DIAGNOSIS — I10 ESSENTIAL (PRIMARY) HYPERTENSION: ICD-10-CM

## 2020-02-13 DIAGNOSIS — Z88.5 ALLERGY STATUS TO NARCOTIC AGENT: ICD-10-CM

## 2020-02-13 DIAGNOSIS — G47.33 OBSTRUCTIVE SLEEP APNEA (ADULT) (PEDIATRIC): ICD-10-CM

## 2020-02-13 DIAGNOSIS — R59.0 LOCALIZED ENLARGED LYMPH NODES: ICD-10-CM

## 2020-02-13 DIAGNOSIS — Z90.710 ACQUIRED ABSENCE OF BOTH CERVIX AND UTERUS: Chronic | ICD-10-CM

## 2020-02-13 DIAGNOSIS — I25.10 ATHEROSCLEROTIC HEART DISEASE OF NATIVE CORONARY ARTERY WITHOUT ANGINA PECTORIS: ICD-10-CM

## 2020-02-13 DIAGNOSIS — Z95.5 PRESENCE OF CORONARY ANGIOPLASTY IMPLANT AND GRAFT: Chronic | ICD-10-CM

## 2020-02-13 LAB
ANION GAP SERPL CALC-SCNC: 15 MMO/L — HIGH (ref 7–14)
BUN SERPL-MCNC: 12 MG/DL — SIGNIFICANT CHANGE UP (ref 7–23)
CALCIUM SERPL-MCNC: 9.6 MG/DL — SIGNIFICANT CHANGE UP (ref 8.4–10.5)
CHLORIDE SERPL-SCNC: 101 MMOL/L — SIGNIFICANT CHANGE UP (ref 98–107)
CO2 SERPL-SCNC: 22 MMOL/L — SIGNIFICANT CHANGE UP (ref 22–31)
CREAT SERPL-MCNC: 0.7 MG/DL — SIGNIFICANT CHANGE UP (ref 0.5–1.3)
GLUCOSE SERPL-MCNC: 76 MG/DL — SIGNIFICANT CHANGE UP (ref 70–99)
HCT VFR BLD CALC: 39.9 % — SIGNIFICANT CHANGE UP (ref 34.5–45)
HGB BLD-MCNC: 12.6 G/DL — SIGNIFICANT CHANGE UP (ref 11.5–15.5)
MCHC RBC-ENTMCNC: 28.8 PG — SIGNIFICANT CHANGE UP (ref 27–34)
MCHC RBC-ENTMCNC: 31.6 % — LOW (ref 32–36)
MCV RBC AUTO: 91.1 FL — SIGNIFICANT CHANGE UP (ref 80–100)
NRBC # FLD: 0 K/UL — SIGNIFICANT CHANGE UP (ref 0–0)
PLATELET # BLD AUTO: 292 K/UL — SIGNIFICANT CHANGE UP (ref 150–400)
PMV BLD: 10.7 FL — SIGNIFICANT CHANGE UP (ref 7–13)
POTASSIUM SERPL-MCNC: 4.2 MMOL/L — SIGNIFICANT CHANGE UP (ref 3.5–5.3)
POTASSIUM SERPL-SCNC: 4.2 MMOL/L — SIGNIFICANT CHANGE UP (ref 3.5–5.3)
RBC # BLD: 4.38 M/UL — SIGNIFICANT CHANGE UP (ref 3.8–5.2)
RBC # FLD: 13.1 % — SIGNIFICANT CHANGE UP (ref 10.3–14.5)
SODIUM SERPL-SCNC: 138 MMOL/L — SIGNIFICANT CHANGE UP (ref 135–145)
WBC # BLD: 6.83 K/UL — SIGNIFICANT CHANGE UP (ref 3.8–10.5)
WBC # FLD AUTO: 6.83 K/UL — SIGNIFICANT CHANGE UP (ref 3.8–10.5)

## 2020-02-13 PROCEDURE — 99214 OFFICE O/P EST MOD 30 MIN: CPT | Mod: 25

## 2020-02-13 PROCEDURE — 93000 ELECTROCARDIOGRAM COMPLETE: CPT

## 2020-02-13 RX ORDER — SODIUM CHLORIDE 9 MG/ML
1000 INJECTION, SOLUTION INTRAVENOUS
Refills: 0 | Status: DISCONTINUED | OUTPATIENT
Start: 2020-02-19 | End: 2020-03-07

## 2020-02-13 NOTE — H&P PST ADULT - NSICDXPASTSURGICALHX_GEN_ALL_CORE_FT
PAST SURGICAL HISTORY:  History of D&C     S/P breast lumpectomy L, 2011    S/P primary angioplasty with coronary stent 2014 x 2    S/P total hysterectomy 2016

## 2020-02-13 NOTE — H&P PST ADULT - HISTORY OF PRESENT ILLNESS
66 y/o obese F with hx CAD, s/p cardiac stents x 2, HTN, Hyperlipidemia, presents for preop eval for Endobronchial Ultrasound Bronchoscopy with cytology.  Pt state she was c/o right flank pain & went to ED x 2 with severe pain; ct scan of abdomen & pelvis done & incidentals finding of enlarged lymph node of chest seen

## 2020-02-13 NOTE — CARDIOLOGY SUMMARY
[Normal] : normal [___] : [unfilled] [LVEF ___%] : LVEF [unfilled]% [None] : no pulmonary hypertension [Mild] : mild mitral regurgitation

## 2020-02-13 NOTE — H&P PST ADULT - NEGATIVE GENERAL GENITOURINARY SYMPTOMS
no urinary hesitancy/no renal colic/no flank pain L/no dysuria/normal urinary frequency/no hematuria

## 2020-02-13 NOTE — H&P PST ADULT - NSICDXPASTMEDICALHX_GEN_ALL_CORE_FT
PAST MEDICAL HISTORY:  Arthritis     Breast cancer L, treated with lumpectomy in 2011    Hyperlipemia     Hypertension PAST MEDICAL HISTORY:  Arthritis     Breast cancer L, treated with lumpectomy in 2011    CAD (coronary artery disease)     Hyperlipemia     Hypertension

## 2020-02-13 NOTE — H&P PST ADULT - NSICDXPROBLEM_GEN_ALL_CORE_FT
PROBLEM DIAGNOSES  Problem: Localized enlarged lymph nodes  Assessment and Plan: expected date of surgery 2/19/20 - endobronchisl ultrasound bronchoscopy with cytology on 2/19/20  pending labs & cardiac clearance   proep instructions given & explained, pt verbalized understanding with teach back method      Problem: CAD (coronary artery disease)  Assessment and Plan: Cardac stents x 2; pt instructed to stay on Aspirin therapy for this procedure- to be discussed with cardiologist    Problem: HTN (hypertension)  Assessment and Plan: Instructed to take Antihypertensive med DOS    Problem: Allergy to morphine  Assessment and Plan: OR booking notified of allergies via fax    Problem: USMAN (obstructive sleep apnea)  Assessment and Plan: Pt met stop-bang criteris for USMAN precautions, OR booking notified

## 2020-02-14 NOTE — PHYSICAL EXAM
[General Appearance - Well Developed] : well developed [Normal Appearance] : normal appearance [General Appearance - Well Nourished] : well nourished [Well Groomed] : well groomed [No Deformities] : no deformities [General Appearance - In No Acute Distress] : no acute distress [Normal Conjunctiva] : the conjunctiva exhibited no abnormalities [Normal Oral Mucosa] : normal oral mucosa [No Oral Pallor] : no oral pallor [No Oral Cyanosis] : no oral cyanosis [Normal Jugular Venous A Waves Present] : normal jugular venous A waves present [No Jugular Venous Clemons A Waves] : no jugular venous clemons A waves [Normal Jugular Venous V Waves Present] : normal jugular venous V waves present [Respiration, Rhythm And Depth] : normal respiratory rhythm and effort [Exaggerated Use Of Accessory Muscles For Inspiration] : no accessory muscle use [Bowel Sounds] : normal bowel sounds [Auscultation Breath Sounds / Voice Sounds] : lungs were clear to auscultation bilaterally [Abdomen Soft] : soft [Abdomen Tenderness] : non-tender [Abnormal Walk] : normal gait [Nail Clubbing] : no clubbing of the fingernails [Cyanosis, Localized] : no localized cyanosis [Petechial Hemorrhages (___cm)] : no petechial hemorrhages [Skin Color & Pigmentation] : normal skin color and pigmentation [] : no rash [Oriented To Time, Place, And Person] : oriented to person, place, and time [No Skin Ulcers] : no skin ulcer [Mood] : the mood was normal [Affect] : the affect was normal [No Anxiety] : not feeling anxious [Normal Rate] : normal [Rhythm Regular] : regular [Normal S2] : normal S2 [Normal S1] : normal S1 [II] : a grade 2 [No Pitting Edema] : no pitting edema present [FreeTextEntry1] : Extraocular muscles intact. Anicteric sclerae. [S3] : no S3 [Right Carotid Bruit] : no bruit heard over the right carotid [Left Carotid Bruit] : no bruit heard over the left carotid [Bruit] : no bruit heard

## 2020-02-14 NOTE — HISTORY OF PRESENT ILLNESS
[FreeTextEntry1] : Patient is a 65 year old woman with a history of coronary artery disease status post stents to the OM1 and LAD 1/2014, HTN, hyperlipidemia, diet- controlled Diabetes mellitus, and family history of coronary artery disease who presents today for follow up of HTN and coronary artery disease and risk stratification prior to bronchoscopy. She states that she has been feeling well from the cardiac standpoint, however, she continues to experience back pain. She otherwise denies any chest pain, dyspnea, palpitations, headaches, and dizziness.

## 2020-02-14 NOTE — DISCUSSION/SUMMARY
[FreeTextEntry1] : IMPRESSION: Ms. GUILLORY is a 65 year -old woman with a  history of coronary artery disease, HTN, hyperlipidemia, type 2 Diabetes mellitus, and family history of coronary artery disease who presents today for follow up of HTN and coronary artery disease and risk stratification prior to bronchoscopy.\par \par PLAN:\par 1. She had a nuclear stress test a little over a year ago that did not reveal any infarct or ischemia. She will continue on ASA 81mg daily. She has not experienced any episodes of chest pain since her last visit with me. \par 2. Her blood pressure is very well controlled, thus she will continue on Amlodipine 10 mg daily, Enalapril 20mg daily, Aldactone 25mg daily, and Toprol XL 25mg daily. She will continue to follow her blood pressure at home and understands the importance of diet modification.   \par 3. Her goal LDL is less than 70. She will continue on diet modification.  Her LDL was most recently OK and improved. She will modify her diet given her mildly elevated triglycerides. She will continue on Atorvastatin 20 mg daily.\par 4. She has intermediate clinical risk predictors for a low risk procedure (bronchoscopy). She may proceed with her procedure from the cardiac standpoint without any further workup. I have no objections if she holds her aspirin the morning of her bronchoscopy.

## 2020-02-18 NOTE — ASU PATIENT PROFILE, ADULT - PMH
Arthritis    Breast cancer  L, treated with lumpectomy in 2011  CAD (coronary artery disease)    Hyperlipemia    Hypertension

## 2020-02-18 NOTE — ASU PATIENT PROFILE, ADULT - NS TRANSFER PROSTHESIS:
pt brought in by ambulance after he got to [facility] with slurred speech. Pt admits to drinking alcohol. Pt A & OX 3, walking with steady gait.
none

## 2020-02-18 NOTE — ASU PATIENT PROFILE, ADULT - PSH
History of D&C    S/P breast lumpectomy  L, 2011  S/P primary angioplasty with coronary stent  2014 x 2  S/P total hysterectomy  2016

## 2020-02-19 ENCOUNTER — RESULT REVIEW (OUTPATIENT)
Age: 65
End: 2020-02-19

## 2020-02-19 ENCOUNTER — APPOINTMENT (OUTPATIENT)
Dept: PULMONOLOGY | Facility: HOSPITAL | Age: 65
End: 2020-02-19

## 2020-02-19 ENCOUNTER — OUTPATIENT (OUTPATIENT)
Dept: OUTPATIENT SERVICES | Facility: HOSPITAL | Age: 65
LOS: 1 days | Discharge: HOME | End: 2020-02-19
Payer: COMMERCIAL

## 2020-02-19 VITALS
SYSTOLIC BLOOD PRESSURE: 128 MMHG | RESPIRATION RATE: 14 BRPM | DIASTOLIC BLOOD PRESSURE: 59 MMHG | HEART RATE: 65 BPM | WEIGHT: 195.11 LBS | TEMPERATURE: 98 F | HEIGHT: 62 IN | OXYGEN SATURATION: 99 %

## 2020-02-19 VITALS
TEMPERATURE: 98 F | HEART RATE: 80 BPM | DIASTOLIC BLOOD PRESSURE: 68 MMHG | RESPIRATION RATE: 17 BRPM | OXYGEN SATURATION: 98 % | SYSTOLIC BLOOD PRESSURE: 119 MMHG

## 2020-02-19 DIAGNOSIS — Z90.710 ACQUIRED ABSENCE OF BOTH CERVIX AND UTERUS: Chronic | ICD-10-CM

## 2020-02-19 DIAGNOSIS — R59.0 LOCALIZED ENLARGED LYMPH NODES: ICD-10-CM

## 2020-02-19 DIAGNOSIS — Z95.5 PRESENCE OF CORONARY ANGIOPLASTY IMPLANT AND GRAFT: Chronic | ICD-10-CM

## 2020-02-19 DIAGNOSIS — Z98.89 OTHER SPECIFIED POSTPROCEDURAL STATES: Chronic | ICD-10-CM

## 2020-02-19 LAB
BODY FLUID TYPE: SIGNIFICANT CHANGE UP
CLARITY SPEC: SIGNIFICANT CHANGE UP
COLOR FLD: COLORLESS — SIGNIFICANT CHANGE UP
GRAM STN SPT: SIGNIFICANT CHANGE UP
GRAM STN WND: SIGNIFICANT CHANGE UP
LYMPHOCYTES NFR FLD: 7 % — SIGNIFICANT CHANGE UP
MACROPHAGES # FLD: 19 % — SIGNIFICANT CHANGE UP
MESOTHL CELL # FLD: 15 % — SIGNIFICANT CHANGE UP
NEUTS SEG NFR FLD MANUAL: 48 % — SIGNIFICANT CHANGE UP
OTHER CELLS FLD MANUAL: 11 % — SIGNIFICANT CHANGE UP
RCV VOL RI: SIGNIFICANT CHANGE UP CELL/UL (ref 0–5)
SPECIMEN SOURCE: SIGNIFICANT CHANGE UP
SPECIMEN SOURCE: SIGNIFICANT CHANGE UP
TOTAL CELLS COUNTED, BODY FLUID: 100 CELLS — SIGNIFICANT CHANGE UP
TOTAL NUCLEATED CELL COUNT, BODY FLUID: SIGNIFICANT CHANGE UP CELL/UL (ref 0–5)

## 2020-02-19 PROCEDURE — 31624 DX BRONCHOSCOPE/LAVAGE: CPT | Mod: GC

## 2020-02-19 PROCEDURE — 88112 CYTOPATH CELL ENHANCE TECH: CPT | Mod: 26,59

## 2020-02-19 PROCEDURE — 31652 BRONCH EBUS SAMPLNG 1/2 NODE: CPT | Mod: GC

## 2020-02-19 PROCEDURE — 88305 TISSUE EXAM BY PATHOLOGIST: CPT | Mod: 26

## 2020-02-19 PROCEDURE — 88173 CYTOPATH EVAL FNA REPORT: CPT | Mod: 26

## 2020-02-19 RX ORDER — FENTANYL CITRATE 50 UG/ML
50 INJECTION INTRAVENOUS ONCE
Refills: 0 | Status: DISCONTINUED | OUTPATIENT
Start: 2020-02-19 | End: 2020-02-19

## 2020-02-19 RX ORDER — EPINEPHRINE 11.25MG/ML
2 SOLUTION, NON-ORAL INHALATION ONCE
Refills: 0 | Status: DISCONTINUED | OUTPATIENT
Start: 2020-02-19 | End: 2020-02-19

## 2020-02-19 RX ORDER — FENTANYL CITRATE 50 UG/ML
25 INJECTION INTRAVENOUS
Refills: 0 | Status: DISCONTINUED | OUTPATIENT
Start: 2020-02-19 | End: 2020-02-19

## 2020-02-19 RX ORDER — ONDANSETRON 8 MG/1
4 TABLET, FILM COATED ORAL ONCE
Refills: 0 | Status: DISCONTINUED | OUTPATIENT
Start: 2020-02-19 | End: 2020-03-07

## 2020-02-19 RX ADMIN — SODIUM CHLORIDE 30 MILLILITER(S): 9 INJECTION, SOLUTION INTRAVENOUS at 11:28

## 2020-02-19 NOTE — ASU DISCHARGE PLAN (ADULT/PEDIATRIC) - ASU DC SPECIAL INSTRUCTIONSFT
1) Today you underwent a diagnostic bronchoscopy. Please follow-up with your pulmonologist within 1-2 weeks to discuss the results of your test today.     2) You may experience throat irritation, coughing or mild bleeding, and low-grade fevers post-procedure. If you experience lightheadedness, chest pain or pressure,  shortness of breath, a large amount of bleeding, or any other symptoms that concern  you please seek medical attention right away.

## 2020-02-19 NOTE — ASU DISCHARGE PLAN (ADULT/PEDIATRIC) - CARE PROVIDER_API CALL
Clint Gutiérrez)  Medicine  Pulmonary Medicine  [address] [place] [address], [place] [address]  Phone: [phone]  Fax: [phone]  Follow Up Time:

## 2020-02-19 NOTE — ASU DISCHARGE PLAN (ADULT/PEDIATRIC) - CALL YOUR DOCTOR IF YOU HAVE ANY OF THE FOLLOWING:
Bleeding that does not stop/Increased irritability or sluggishness/Pain not relieved by Medications/Nausea and vomiting that does not stop/Inability to tolerate liquids or foods

## 2020-02-20 LAB
CULTURE - ACID FAST SMEAR CONCENTRATED: SIGNIFICANT CHANGE UP
CULTURE - ACID FAST SMEAR CONCENTRATED: SIGNIFICANT CHANGE UP
SPECIMEN SOURCE: SIGNIFICANT CHANGE UP

## 2020-02-20 NOTE — PHARMACOTHERAPY INTERVENTION NOTE - COMMENTS
2ml (4 ampules of 2.25% ordered)- pt was stable in PACU. Paged md if pt needed smaller dose. S/p with md- she d/c'ed the order

## 2020-02-21 LAB — BACTERIA SPT RESP CULT: SIGNIFICANT CHANGE UP

## 2020-02-22 LAB — CULTURE - SURGICAL SITE: SIGNIFICANT CHANGE UP

## 2020-02-26 PROBLEM — I25.10 ATHEROSCLEROTIC HEART DISEASE OF NATIVE CORONARY ARTERY WITHOUT ANGINA PECTORIS: Chronic | Status: ACTIVE | Noted: 2020-02-13

## 2020-02-26 LAB
SPECIMEN SOURCE: SIGNIFICANT CHANGE UP
SPECIMEN SOURCE: SIGNIFICANT CHANGE UP

## 2020-03-03 LAB
SPECIMEN SOURCE: SIGNIFICANT CHANGE UP
SPECIMEN SOURCE: SIGNIFICANT CHANGE UP

## 2020-03-04 ENCOUNTER — APPOINTMENT (OUTPATIENT)
Dept: PULMONOLOGY | Facility: CLINIC | Age: 65
End: 2020-03-04

## 2020-03-12 ENCOUNTER — APPOINTMENT (OUTPATIENT)
Dept: CARDIOLOGY | Facility: CLINIC | Age: 65
End: 2020-03-12

## 2020-03-19 ENCOUNTER — APPOINTMENT (OUTPATIENT)
Dept: PULMONOLOGY | Facility: CLINIC | Age: 65
End: 2020-03-19

## 2020-03-21 LAB
FUNGUS SPEC QL CULT: SIGNIFICANT CHANGE UP
FUNGUS SPEC QL CULT: SIGNIFICANT CHANGE UP

## 2020-04-01 LAB
ACID FAST STN SPEC: SIGNIFICANT CHANGE UP
ACID FAST STN SPEC: SIGNIFICANT CHANGE UP

## 2020-06-11 ENCOUNTER — NON-APPOINTMENT (OUTPATIENT)
Age: 65
End: 2020-06-11

## 2020-06-19 NOTE — ED PROVIDER NOTE - PROVIDER TOKENS
I called the patient with his PET scan results. 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 His recent AFB cultures are negative to date. We will recheck a chest CT in 3 months.   He will schedule an appointment a couple of days after that exam. PROVIDER:[TOKEN:[[id number]],FOLLOWUP:[7-10 Days]]

## 2020-06-20 ENCOUNTER — OUTPATIENT (OUTPATIENT)
Dept: OUTPATIENT SERVICES | Facility: HOSPITAL | Age: 65
LOS: 1 days | End: 2020-06-20
Payer: COMMERCIAL

## 2020-06-20 ENCOUNTER — APPOINTMENT (OUTPATIENT)
Dept: CT IMAGING | Facility: IMAGING CENTER | Age: 65
End: 2020-06-20

## 2020-06-20 DIAGNOSIS — Z90.710 ACQUIRED ABSENCE OF BOTH CERVIX AND UTERUS: Chronic | ICD-10-CM

## 2020-06-20 DIAGNOSIS — Z95.5 PRESENCE OF CORONARY ANGIOPLASTY IMPLANT AND GRAFT: Chronic | ICD-10-CM

## 2020-06-20 DIAGNOSIS — R59.0 LOCALIZED ENLARGED LYMPH NODES: ICD-10-CM

## 2020-06-20 DIAGNOSIS — Z98.89 OTHER SPECIFIED POSTPROCEDURAL STATES: Chronic | ICD-10-CM

## 2020-06-20 PROCEDURE — 71250 CT THORAX DX C-: CPT | Mod: 26

## 2020-06-20 PROCEDURE — 71250 CT THORAX DX C-: CPT

## 2020-06-22 ENCOUNTER — APPOINTMENT (OUTPATIENT)
Dept: PULMONOLOGY | Facility: CLINIC | Age: 65
End: 2020-06-22
Payer: COMMERCIAL

## 2020-06-22 DIAGNOSIS — R59.0 LOCALIZED ENLARGED LYMPH NODES: ICD-10-CM

## 2020-06-22 PROCEDURE — 99214 OFFICE O/P EST MOD 30 MIN: CPT | Mod: 95

## 2020-06-22 NOTE — REASON FOR VISIT
[Medical Office: ([facility])___] : at the medical office located in  [Home] : at home, [unfilled] , at the time of the visit. [Family Member] : family member [Verbal consent obtained from patient] : the patient, [unfilled] [Abnormal CXR/ Chest CT] : an abnormal CXR/ chest CT [Follow-Up] : a follow-up visit

## 2020-06-22 NOTE — HISTORY OF PRESENT ILLNESS
[TextBox_4] : 65 year old female with abnormal CT scan suspicious for sarcoid presents for follow up.  She had a bronchoscopy where biopsy showed non necrotizing granuloma.  On repeat CT scan one lymph node appeared slightly larger when compared to previous.  Pt denies fever, chills, skin rashes, blurry vision, arthralgias.  She denies having any current symptoms at this time.

## 2020-06-22 NOTE — DISCUSSION/SUMMARY
[FreeTextEntry1] : 65 year old female with abnormal CT scan suspicious for sarcoid presents for follow up\par \par Enlarged mediastinal lymph nodes\par -discussed the high likelihood  that pt has sarcoidosis\par -no treatment is necessary at this time as pt is completely asymptomatic\par -will need follow up CT scan in 3 months \par -pt will call back if she develops any new symptoms\par \par At least one node appears larger..? secondary to biopsy vs disease progression vs alternative etiology

## 2020-07-02 ENCOUNTER — APPOINTMENT (OUTPATIENT)
Dept: CARDIOLOGY | Facility: CLINIC | Age: 65
End: 2020-07-02
Payer: COMMERCIAL

## 2020-07-02 ENCOUNTER — NON-APPOINTMENT (OUTPATIENT)
Age: 65
End: 2020-07-02

## 2020-07-02 VITALS
HEART RATE: 76 BPM | HEIGHT: 62 IN | DIASTOLIC BLOOD PRESSURE: 79 MMHG | BODY MASS INDEX: 35.33 KG/M2 | OXYGEN SATURATION: 98 % | SYSTOLIC BLOOD PRESSURE: 133 MMHG | WEIGHT: 192 LBS

## 2020-07-02 PROCEDURE — 99214 OFFICE O/P EST MOD 30 MIN: CPT | Mod: 25

## 2020-07-02 PROCEDURE — 93000 ELECTROCARDIOGRAM COMPLETE: CPT

## 2020-07-02 NOTE — CARDIOLOGY SUMMARY
[Normal] : normal [LVEF ___%] : LVEF [unfilled]% [___] : [unfilled] [None] : no pulmonary hypertension [Mild] : mild mitral regurgitation

## 2020-07-03 LAB
25(OH)D3 SERPL-MCNC: 12.9 NG/ML
ALBUMIN SERPL ELPH-MCNC: 4.8 G/DL
ALP BLD-CCNC: 89 U/L
ALT SERPL-CCNC: 21 U/L
ANION GAP SERPL CALC-SCNC: 16 MMOL/L
AST SERPL-CCNC: 21 U/L
BILIRUB SERPL-MCNC: 0.6 MG/DL
BUN SERPL-MCNC: 15 MG/DL
CALCIUM SERPL-MCNC: 10 MG/DL
CHLORIDE SERPL-SCNC: 102 MMOL/L
CHOLEST SERPL-MCNC: 179 MG/DL
CHOLEST/HDLC SERPL: 3.9 RATIO
CO2 SERPL-SCNC: 22 MMOL/L
CREAT SERPL-MCNC: 0.81 MG/DL
ESTIMATED AVERAGE GLUCOSE: 134 MG/DL
GLUCOSE SERPL-MCNC: 116 MG/DL
HBA1C MFR BLD HPLC: 6.3 %
HDLC SERPL-MCNC: 46 MG/DL
LDLC SERPL CALC-MCNC: 98 MG/DL
POTASSIUM SERPL-SCNC: 4.5 MMOL/L
PROT SERPL-MCNC: 7.9 G/DL
SARS-COV-2 IGG SERPL IA-ACNC: <3.8 AU/ML
SARS-COV-2 IGG SERPL QL IA: NEGATIVE
SODIUM SERPL-SCNC: 141 MMOL/L
TRIGL SERPL-MCNC: 179 MG/DL
TSH SERPL-ACNC: 2.04 UIU/ML

## 2020-07-12 NOTE — DISCUSSION/SUMMARY
[FreeTextEntry1] : IMPRESSION: Ms. GUILLORY is a 65 year -old woman with a  history of coronary artery disease, HTN, hyperlipidemia, type 2 Diabetes mellitus, and family history of coronary artery disease who presents today for follow up of HTN and coronary artery disease.\par \par PLAN:\par 1. She had a nuclear stress test a little less than 2 years ago that did not reveal any infarct or ischemia. She will continue on ASA 81mg daily. She has not experienced any episodes of chest pain since her last visit with me. \par 2. Her blood pressure is adequately controlled, thus she will continue on Amlodipine 10 mg daily, Enalapril 20mg daily, Aldactone 25mg daily, and Toprol XL 25mg daily. She will continue to follow her blood pressure at home and understands the importance of diet modification.   I will be checking a CMP to evaluate for any adverse effects to her potassium and creatinine.\par 3. Her goal LDL is less than 70. She will continue on diet modification.  Her LDL was most recently OK and improved. She will modify her diet given her mildly elevated triglycerides. She will continue on Atorvastatin 20 mg daily. I will be checking a lipid profile and LFTs today.\par 4. She will follow up with me in 4 months or sooner should she experience any symptoms in the interim.

## 2020-07-12 NOTE — HISTORY OF PRESENT ILLNESS
[FreeTextEntry1] : Patient is a 65 year old woman with a history of coronary artery disease status post stents to the OM1 and LAD 1/2014, HTN, hyperlipidemia, diet- controlled Diabetes mellitus, and family history of coronary artery disease who presents today for follow up of HTN and coronary artery disease. She states that she has been feeling relatively well from the cardiac standpoint denying any chest pain, dyspnea, palpitations, headaches, and dizziness. She states that her blood pressure has been for the most part well controlled at home.

## 2020-07-12 NOTE — PHYSICAL EXAM
[General Appearance - Well Developed] : well developed [Normal Appearance] : normal appearance [Well Groomed] : well groomed [General Appearance - Well Nourished] : well nourished [No Deformities] : no deformities [General Appearance - In No Acute Distress] : no acute distress [Normal Conjunctiva] : the conjunctiva exhibited no abnormalities [Normal Oral Mucosa] : normal oral mucosa [Normal Jugular Venous A Waves Present] : normal jugular venous A waves present [No Oral Cyanosis] : no oral cyanosis [No Oral Pallor] : no oral pallor [Normal Jugular Venous V Waves Present] : normal jugular venous V waves present [No Jugular Venous Clemons A Waves] : no jugular venous clemons A waves [Exaggerated Use Of Accessory Muscles For Inspiration] : no accessory muscle use [Respiration, Rhythm And Depth] : normal respiratory rhythm and effort [Auscultation Breath Sounds / Voice Sounds] : lungs were clear to auscultation bilaterally [Bowel Sounds] : normal bowel sounds [Abdomen Soft] : soft [Abnormal Walk] : normal gait [Abdomen Tenderness] : non-tender [Nail Clubbing] : no clubbing of the fingernails [Cyanosis, Localized] : no localized cyanosis [Petechial Hemorrhages (___cm)] : no petechial hemorrhages [No Skin Ulcers] : no skin ulcer [Skin Color & Pigmentation] : normal skin color and pigmentation [] : no rash [Mood] : the mood was normal [Affect] : the affect was normal [Oriented To Time, Place, And Person] : oriented to person, place, and time [Normal Rate] : normal [No Anxiety] : not feeling anxious [Rhythm Regular] : regular [Normal S1] : normal S1 [Normal S2] : normal S2 [II] : a grade 2 [No Pitting Edema] : no pitting edema present [FreeTextEntry1] : Extraocular muscles intact. Anicteric sclerae. [Left Carotid Bruit] : no bruit heard over the left carotid [S3] : no S3 [Bruit] : no bruit heard [Right Carotid Bruit] : no bruit heard over the right carotid

## 2020-07-13 LAB
BASOPHILS # BLD AUTO: 0.03 K/UL
BASOPHILS NFR BLD AUTO: 0.5 %
EOSINOPHIL # BLD AUTO: 0.15 K/UL
EOSINOPHIL NFR BLD AUTO: 2.4 %
HCT VFR BLD CALC: 37.6 %
HGB BLD-MCNC: 12.7 G/DL
IMM GRANULOCYTES NFR BLD AUTO: 0.2 %
LYMPHOCYTES # BLD AUTO: 1.44 K/UL
LYMPHOCYTES NFR BLD AUTO: 22.7 %
MAN DIFF?: NORMAL
MCHC RBC-ENTMCNC: 30 PG
MCHC RBC-ENTMCNC: 33.8 GM/DL
MCV RBC AUTO: 88.9 FL
MONOCYTES # BLD AUTO: 0.47 K/UL
MONOCYTES NFR BLD AUTO: 7.4 %
NEUTROPHILS # BLD AUTO: 4.25 K/UL
NEUTROPHILS NFR BLD AUTO: 66.8 %
PLATELET # BLD AUTO: 272 K/UL
RBC # BLD: 4.23 M/UL
RBC # FLD: 12.4 %
WBC # FLD AUTO: 6.35 K/UL

## 2020-10-03 NOTE — ED ADULT NURSE REASSESSMENT NOTE - NSIMPLEMENTINTERV_GEN_ALL_ED
Problem: Activity Intolerance  Goal: # Functional status is maintained or returned to baseline  Outcome: Outcome Met, Continue evaluating goal progress toward completion   Note: Will work with PT and their recommendations to increase pt's physical activity.     Problem: VTE, Risk for  Goal: # No s/s of VTE  Description: No signs at this time, will continue to monitor  Outcome: Outcome Met, Continue evaluating goal progress toward completion   NOTE: encourage TEDs and SCDs.     Problem: At Risk for Falls  Goal: # Patient does not fall  Outcome: Outcome Met, Continue evaluating goal progress toward completion   Note: Assisted pt with cares and activities.    Implemented All Universal Safety Interventions:  [place] to call system. Call bell, personal items and telephone within reach. Instruct patient to call for assistance. Room bathroom lighting operational. Non-slip footwear when patient is off stretcher. Physically safe environment: no spills, clutter or unnecessary equipment. Stretcher in lowest position, wheels locked, appropriate side rails in place.

## 2020-11-04 ENCOUNTER — NON-APPOINTMENT (OUTPATIENT)
Age: 65
End: 2020-11-04

## 2020-11-04 ENCOUNTER — APPOINTMENT (OUTPATIENT)
Dept: OPHTHALMOLOGY | Facility: CLINIC | Age: 65
End: 2020-11-04
Payer: COMMERCIAL

## 2020-11-04 PROCEDURE — 99072 ADDL SUPL MATRL&STAF TM PHE: CPT

## 2020-11-04 PROCEDURE — 92014 COMPRE OPH EXAM EST PT 1/>: CPT

## 2020-11-04 PROCEDURE — 92015 DETERMINE REFRACTIVE STATE: CPT

## 2020-11-05 ENCOUNTER — NON-APPOINTMENT (OUTPATIENT)
Age: 65
End: 2020-11-05

## 2020-11-05 ENCOUNTER — APPOINTMENT (OUTPATIENT)
Dept: CARDIOLOGY | Facility: CLINIC | Age: 65
End: 2020-11-05
Payer: COMMERCIAL

## 2020-11-05 VITALS
BODY MASS INDEX: 35.12 KG/M2 | HEART RATE: 64 BPM | DIASTOLIC BLOOD PRESSURE: 83 MMHG | SYSTOLIC BLOOD PRESSURE: 147 MMHG | WEIGHT: 192 LBS | OXYGEN SATURATION: 98 %

## 2020-11-05 VITALS — DIASTOLIC BLOOD PRESSURE: 70 MMHG | SYSTOLIC BLOOD PRESSURE: 132 MMHG

## 2020-11-05 PROCEDURE — 99214 OFFICE O/P EST MOD 30 MIN: CPT | Mod: 25

## 2020-11-05 PROCEDURE — 99072 ADDL SUPL MATRL&STAF TM PHE: CPT

## 2020-11-05 PROCEDURE — 93000 ELECTROCARDIOGRAM COMPLETE: CPT

## 2020-11-06 LAB
25(OH)D3 SERPL-MCNC: 17.2 NG/ML
ALBUMIN SERPL ELPH-MCNC: 4.6 G/DL
ALP BLD-CCNC: 95 U/L
ALT SERPL-CCNC: 15 U/L
ANION GAP SERPL CALC-SCNC: 12 MMOL/L
AST SERPL-CCNC: 17 U/L
BILIRUB SERPL-MCNC: 0.5 MG/DL
BUN SERPL-MCNC: 13 MG/DL
CALCIUM SERPL-MCNC: 9.8 MG/DL
CHLORIDE SERPL-SCNC: 102 MMOL/L
CHOLEST SERPL-MCNC: 174 MG/DL
CO2 SERPL-SCNC: 26 MMOL/L
CREAT SERPL-MCNC: 0.8 MG/DL
GLUCOSE SERPL-MCNC: 105 MG/DL
HDLC SERPL-MCNC: 47 MG/DL
LDLC SERPL CALC-MCNC: 97 MG/DL
NONHDLC SERPL-MCNC: 127 MG/DL
POTASSIUM SERPL-SCNC: 4.5 MMOL/L
PROT SERPL-MCNC: 7.3 G/DL
SODIUM SERPL-SCNC: 140 MMOL/L
TRIGL SERPL-MCNC: 150 MG/DL
TSH SERPL-ACNC: 1.4 UIU/ML
URATE SERPL-MCNC: 6.5 MG/DL

## 2020-11-19 LAB
BASOPHILS # BLD AUTO: 0.02 K/UL
BASOPHILS NFR BLD AUTO: 0.3 %
EOSINOPHIL # BLD AUTO: 0.18 K/UL
EOSINOPHIL NFR BLD AUTO: 3.1 %
ESTIMATED AVERAGE GLUCOSE: 143 MG/DL
HBA1C MFR BLD HPLC: 6.6 %
HCT VFR BLD CALC: 38 %
HGB BLD-MCNC: 12 G/DL
IMM GRANULOCYTES NFR BLD AUTO: 0 %
LYMPHOCYTES # BLD AUTO: 1.48 K/UL
LYMPHOCYTES NFR BLD AUTO: 25.6 %
MAN DIFF?: NORMAL
MCHC RBC-ENTMCNC: 28.7 PG
MCHC RBC-ENTMCNC: 31.6 GM/DL
MCV RBC AUTO: 90.9 FL
MONOCYTES # BLD AUTO: 0.39 K/UL
MONOCYTES NFR BLD AUTO: 6.8 %
NEUTROPHILS # BLD AUTO: 3.7 K/UL
NEUTROPHILS NFR BLD AUTO: 64.2 %
PLATELET # BLD AUTO: 275 K/UL
RBC # BLD: 4.18 M/UL
RBC # FLD: 13.2 %
WBC # FLD AUTO: 5.77 K/UL

## 2020-11-25 NOTE — ASU DISCHARGE PLAN (ADULT/PEDIATRIC) - NURSING INSTRUCTIONS
yes For the next 24-48 hours, avoid spicy, greasy, citrus, dairy, carbonated beverages & jagged edged foods like dry crackers and toast. Have fluids at room temperature. Any difficulty breathing or shortness of breath please call 911 immediately.

## 2020-12-12 ENCOUNTER — APPOINTMENT (OUTPATIENT)
Dept: CT IMAGING | Facility: IMAGING CENTER | Age: 65
End: 2020-12-12

## 2020-12-19 ENCOUNTER — APPOINTMENT (OUTPATIENT)
Dept: CT IMAGING | Facility: IMAGING CENTER | Age: 65
End: 2020-12-19
Payer: COMMERCIAL

## 2020-12-19 ENCOUNTER — OUTPATIENT (OUTPATIENT)
Dept: OUTPATIENT SERVICES | Facility: HOSPITAL | Age: 65
LOS: 1 days | End: 2020-12-19
Payer: COMMERCIAL

## 2020-12-19 ENCOUNTER — APPOINTMENT (OUTPATIENT)
Dept: CT IMAGING | Facility: IMAGING CENTER | Age: 65
End: 2020-12-19

## 2020-12-19 DIAGNOSIS — Z95.5 PRESENCE OF CORONARY ANGIOPLASTY IMPLANT AND GRAFT: Chronic | ICD-10-CM

## 2020-12-19 DIAGNOSIS — Z98.89 OTHER SPECIFIED POSTPROCEDURAL STATES: Chronic | ICD-10-CM

## 2020-12-19 DIAGNOSIS — R59.0 LOCALIZED ENLARGED LYMPH NODES: ICD-10-CM

## 2020-12-19 DIAGNOSIS — Z90.710 ACQUIRED ABSENCE OF BOTH CERVIX AND UTERUS: Chronic | ICD-10-CM

## 2020-12-19 PROCEDURE — 71250 CT THORAX DX C-: CPT

## 2020-12-19 PROCEDURE — 71250 CT THORAX DX C-: CPT | Mod: 26

## 2021-02-04 NOTE — HISTORY OF PRESENT ILLNESS
[FreeTextEntry1] : Patient is a 65 year old woman with a history of coronary artery disease status post stents to the OM1 and LAD 1/2014, HTN, hyperlipidemia, diet- controlled Diabetes mellitus, and family history of coronary artery disease who presents today for follow up of HTN and coronary artery disease. She mentions experiencing right knee pain for the past 2 days. She also mentions episodes of congestion, which she attributes to her allergies. She otherwise denies any chest pain, dyspnea, palpitations, headaches, and dizziness. She states that her blood pressure has been for the most part well controlled at home.

## 2021-02-04 NOTE — PHYSICAL EXAM
[General Appearance - Well Developed] : well developed [Normal Appearance] : normal appearance [Well Groomed] : well groomed [General Appearance - Well Nourished] : well nourished [No Deformities] : no deformities [General Appearance - In No Acute Distress] : no acute distress [Normal Conjunctiva] : the conjunctiva exhibited no abnormalities [Normal Jugular Venous A Waves Present] : normal jugular venous A waves present [Normal Jugular Venous V Waves Present] : normal jugular venous V waves present [No Jugular Venous Clemons A Waves] : no jugular venous clemons A waves [Respiration, Rhythm And Depth] : normal respiratory rhythm and effort [Exaggerated Use Of Accessory Muscles For Inspiration] : no accessory muscle use [Auscultation Breath Sounds / Voice Sounds] : lungs were clear to auscultation bilaterally [Bowel Sounds] : normal bowel sounds [Abdomen Soft] : soft [Abdomen Tenderness] : non-tender [Abnormal Walk] : normal gait [Nail Clubbing] : no clubbing of the fingernails [Cyanosis, Localized] : no localized cyanosis [Petechial Hemorrhages (___cm)] : no petechial hemorrhages [Skin Color & Pigmentation] : normal skin color and pigmentation [] : no rash [No Skin Ulcers] : no skin ulcer [Oriented To Time, Place, And Person] : oriented to person, place, and time [Affect] : the affect was normal [Mood] : the mood was normal [No Anxiety] : not feeling anxious [Normal Rate] : normal [Rhythm Regular] : regular [Normal S1] : normal S1 [Normal S2] : normal S2 [II] : a grade 2 [No Pitting Edema] : no pitting edema present [FreeTextEntry1] : She was wearing a face mask during the examination.  [S3] : no S3 [Right Carotid Bruit] : no bruit heard over the right carotid [Left Carotid Bruit] : no bruit heard over the left carotid [Bruit] : no bruit heard

## 2021-02-04 NOTE — DISCUSSION/SUMMARY
[FreeTextEntry1] : IMPRESSION: Ms. GUILLORY is a 65 year -old woman with a  history of coronary artery disease, HTN, hyperlipidemia, type 2 Diabetes mellitus, and family history of coronary artery disease who presents today for follow up of HTN and coronary artery disease.\par \par PLAN:\par 1. She had a nuclear stress test about 2 years ago that did not reveal any infarct or ischemia. She will continue on ASA 81mg daily. She has not experienced any episodes of chest pain since her last visit with me. \par 2. Her blood pressure is acceptable, thus she will continue on Amlodipine 10 mg daily, Enalapril 20mg daily, Aldactone 25mg daily, and Toprol XL 25mg daily. She will continue to follow her blood pressure at home and understands the importance of diet modification.   I will be checking a CMP to evaluate for any adverse effects to her potassium and creatinine.\par 3. Her goal LDL is less than 70. Her LDL most recently trended up. She will modify her diet given her mildly elevated triglycerides. She will continue on Atorvastatin 20 mg daily. I will be checking a lipid profile and LFTs today.\par 4. She will follow up with me in 4 months or sooner should she experience any symptoms in the interim.\par 5. I have asked her to schedule an echocardiogram at the time of her next visit given her CAD.

## 2021-03-09 ENCOUNTER — APPOINTMENT (OUTPATIENT)
Dept: CV DIAGNOSITCS | Facility: HOSPITAL | Age: 66
End: 2021-03-09

## 2021-03-09 ENCOUNTER — OUTPATIENT (OUTPATIENT)
Dept: OUTPATIENT SERVICES | Facility: HOSPITAL | Age: 66
LOS: 1 days | End: 2021-03-09
Payer: COMMERCIAL

## 2021-03-09 DIAGNOSIS — Z98.89 OTHER SPECIFIED POSTPROCEDURAL STATES: Chronic | ICD-10-CM

## 2021-03-09 DIAGNOSIS — Z95.5 PRESENCE OF CORONARY ANGIOPLASTY IMPLANT AND GRAFT: Chronic | ICD-10-CM

## 2021-03-09 DIAGNOSIS — Z90.710 ACQUIRED ABSENCE OF BOTH CERVIX AND UTERUS: Chronic | ICD-10-CM

## 2021-03-09 DIAGNOSIS — I25.10 ATHEROSCLEROTIC HEART DISEASE OF NATIVE CORONARY ARTERY WITHOUT ANGINA PECTORIS: ICD-10-CM

## 2021-03-09 PROCEDURE — 93306 TTE W/DOPPLER COMPLETE: CPT

## 2021-03-09 PROCEDURE — 93306 TTE W/DOPPLER COMPLETE: CPT | Mod: 26

## 2021-04-01 ENCOUNTER — APPOINTMENT (OUTPATIENT)
Dept: CARDIOLOGY | Facility: CLINIC | Age: 66
End: 2021-04-01
Payer: COMMERCIAL

## 2021-04-01 ENCOUNTER — NON-APPOINTMENT (OUTPATIENT)
Age: 66
End: 2021-04-01

## 2021-04-01 VITALS
BODY MASS INDEX: 36.25 KG/M2 | WEIGHT: 197 LBS | HEART RATE: 55 BPM | HEIGHT: 62 IN | OXYGEN SATURATION: 100 % | SYSTOLIC BLOOD PRESSURE: 129 MMHG | DIASTOLIC BLOOD PRESSURE: 71 MMHG

## 2021-04-01 LAB
25(OH)D3 SERPL-MCNC: 26.4 NG/ML
ALBUMIN SERPL ELPH-MCNC: 4.3 G/DL
ALP BLD-CCNC: 86 U/L
ALT SERPL-CCNC: 13 U/L
ANION GAP SERPL CALC-SCNC: 11 MMOL/L
AST SERPL-CCNC: 15 U/L
BASOPHILS # BLD AUTO: 0.02 K/UL
BASOPHILS NFR BLD AUTO: 0.4 %
BILIRUB SERPL-MCNC: 0.6 MG/DL
BUN SERPL-MCNC: 15 MG/DL
CALCIUM SERPL-MCNC: 9.6 MG/DL
CHLORIDE SERPL-SCNC: 108 MMOL/L
CHOLEST SERPL-MCNC: 191 MG/DL
CO2 SERPL-SCNC: 23 MMOL/L
CREAT SERPL-MCNC: 0.82 MG/DL
EOSINOPHIL # BLD AUTO: 0.2 K/UL
EOSINOPHIL NFR BLD AUTO: 3.6 %
ESTIMATED AVERAGE GLUCOSE: 140 MG/DL
GLUCOSE SERPL-MCNC: 126 MG/DL
HBA1C MFR BLD HPLC: 6.5 %
HCT VFR BLD CALC: 35 %
HDLC SERPL-MCNC: 45 MG/DL
HGB BLD-MCNC: 11.4 G/DL
IMM GRANULOCYTES NFR BLD AUTO: 0.4 %
LDLC SERPL CALC-MCNC: 109 MG/DL
LYMPHOCYTES # BLD AUTO: 1.26 K/UL
LYMPHOCYTES NFR BLD AUTO: 22.4 %
MAN DIFF?: NORMAL
MCHC RBC-ENTMCNC: 28.1 PG
MCHC RBC-ENTMCNC: 32.6 GM/DL
MCV RBC AUTO: 86.2 FL
MONOCYTES # BLD AUTO: 0.36 K/UL
MONOCYTES NFR BLD AUTO: 6.4 %
NEUTROPHILS # BLD AUTO: 3.76 K/UL
NEUTROPHILS NFR BLD AUTO: 66.8 %
NONHDLC SERPL-MCNC: 146 MG/DL
PLATELET # BLD AUTO: 258 K/UL
POTASSIUM SERPL-SCNC: 4.1 MMOL/L
PROT SERPL-MCNC: 7.3 G/DL
RBC # BLD: 4.06 M/UL
RBC # FLD: 13.2 %
SODIUM SERPL-SCNC: 142 MMOL/L
TRIGL SERPL-MCNC: 189 MG/DL
TSH SERPL-ACNC: 1.87 UIU/ML
WBC # FLD AUTO: 5.62 K/UL

## 2021-04-01 PROCEDURE — 99214 OFFICE O/P EST MOD 30 MIN: CPT | Mod: 25

## 2021-04-01 PROCEDURE — 93000 ELECTROCARDIOGRAM COMPLETE: CPT

## 2021-04-01 PROCEDURE — 99072 ADDL SUPL MATRL&STAF TM PHE: CPT

## 2021-04-01 NOTE — HISTORY OF PRESENT ILLNESS
[FreeTextEntry1] : Patient is a 66 year old woman with a history of coronary artery disease status post stents to the OM1 and LAD 1/2014, HTN, hyperlipidemia, diet- controlled Diabetes mellitus, and family history of coronary artery disease who presents today for follow up of HTN and coronary artery disease. She mentions experiencing left shoulder point pain, but otherwise denies any chest pain, dyspnea, palpitations, headaches, and dizziness. She states that she completed her COVID vaccine series at the end of February.

## 2021-04-01 NOTE — DISCUSSION/SUMMARY
[FreeTextEntry1] : IMPRESSION: Ms. GUILLORY is a 66 year -old woman with a  history of coronary artery disease, HTN, hyperlipidemia, type 2 Diabetes mellitus, and family history of coronary artery disease who presents today for follow up of HTN and coronary artery disease.\par \par PLAN:\par 1. She had a nuclear stress test about 2 and a half years ago that did not reveal any infarct or ischemia. She will continue on ASA 81 mg daily. She has not experienced any episodes of chest pain since her last visit with me. We discussed scheduling her for a stress test, however, she wants to hold off an discuss at the time of her next visit. Aside from sinus bradycardia, her ECG was good.\par 2. Her blood pressure is good today, thus she will continue on Amlodipine 10 mg daily, Enalapril 20 mg daily, Aldactone 25 mg daily, and Toprol XL 25 mg daily. She will continue to follow her blood pressure at home and understands the importance of diet modification.   I will be checking a CMP to evaluate for any adverse effects to her potassium and creatinine.\par 3. Her goal LDL is less than 70. Her LDL most recently was above goal, but under 100. She will modify her diet given her mildly elevated triglycerides. She will continue on Atorvastatin 20 mg daily. I will be checking a lipid profile and LFTs today.\par 4. She will follow up with me in 4 months or sooner should she experience any symptoms in the interim.

## 2021-04-01 NOTE — PHYSICAL EXAM
[General Appearance - Well Developed] : well developed [Normal Appearance] : normal appearance [Well Groomed] : well groomed [General Appearance - Well Nourished] : well nourished [No Deformities] : no deformities [General Appearance - In No Acute Distress] : no acute distress [Normal Conjunctiva] : the conjunctiva exhibited no abnormalities [Normal Jugular Venous A Waves Present] : normal jugular venous A waves present [Normal Jugular Venous V Waves Present] : normal jugular venous V waves present [No Jugular Venous Clemons A Waves] : no jugular venous clemons A waves [Respiration, Rhythm And Depth] : normal respiratory rhythm and effort [Exaggerated Use Of Accessory Muscles For Inspiration] : no accessory muscle use [Auscultation Breath Sounds / Voice Sounds] : lungs were clear to auscultation bilaterally [Bowel Sounds] : normal bowel sounds [Abdomen Soft] : soft [Abdomen Tenderness] : non-tender [Abnormal Walk] : normal gait [Nail Clubbing] : no clubbing of the fingernails [Cyanosis, Localized] : no localized cyanosis [Petechial Hemorrhages (___cm)] : no petechial hemorrhages [Skin Color & Pigmentation] : normal skin color and pigmentation [] : no rash [No Skin Ulcers] : no skin ulcer [Oriented To Time, Place, And Person] : oriented to person, place, and time [Affect] : the affect was normal [Mood] : the mood was normal [No Anxiety] : not feeling anxious [Rhythm Regular] : regular [Normal S1] : normal S1 [Normal S2] : normal S2 [II] : a grade 2 [No Pitting Edema] : no pitting edema present [FreeTextEntry1] : She was wearing a face mask during the examination.  [Bradycardia] : bradycardic [S3] : no S3 [Right Carotid Bruit] : no bruit heard over the right carotid [Left Carotid Bruit] : no bruit heard over the left carotid [Bruit] : no bruit heard

## 2021-05-10 ENCOUNTER — APPOINTMENT (OUTPATIENT)
Dept: CARDIOLOGY | Facility: CLINIC | Age: 66
End: 2021-05-10

## 2021-05-28 NOTE — ED PROVIDER NOTE - CONSTITUTIONAL [+], MLM
Spoke with patient made aware of new dosage  Pt verbalized understanding  New prescription requested  Task sent to Dr Candis Jiang  FEVER

## 2021-07-02 ENCOUNTER — RX RENEWAL (OUTPATIENT)
Age: 66
End: 2021-07-02

## 2021-08-05 ENCOUNTER — NON-APPOINTMENT (OUTPATIENT)
Age: 66
End: 2021-08-05

## 2021-08-05 ENCOUNTER — APPOINTMENT (OUTPATIENT)
Dept: CARDIOLOGY | Facility: CLINIC | Age: 66
End: 2021-08-05
Payer: COMMERCIAL

## 2021-08-05 VITALS
DIASTOLIC BLOOD PRESSURE: 67 MMHG | HEIGHT: 62 IN | SYSTOLIC BLOOD PRESSURE: 110 MMHG | OXYGEN SATURATION: 97 % | HEART RATE: 62 BPM | WEIGHT: 197 LBS | BODY MASS INDEX: 36.25 KG/M2

## 2021-08-05 PROCEDURE — 93000 ELECTROCARDIOGRAM COMPLETE: CPT

## 2021-08-05 PROCEDURE — 99214 OFFICE O/P EST MOD 30 MIN: CPT | Mod: 25

## 2021-08-05 NOTE — DISCUSSION/SUMMARY
[FreeTextEntry1] : IMPRESSION: Ms. GUILLORY is a 66 year -old woman with a  history of coronary artery disease, HTN, hyperlipidemia, type 2 Diabetes mellitus, and family history of coronary artery disease who presents today for follow up of HTN, hyperlipidemia, and coronary artery disease.\par \par PLAN:\par 1. She had a nuclear stress test a little less than 3 years ago that did not reveal any infarct or ischemia. She will continue on ASA 81 mg daily. She has not experienced any episodes of chest pain since her last visit with me. We discussed scheduling her for a stress test, however, she wants to hold off an discuss at the time of her next visit. Her ECG was normal today. \par 2. Her blood pressure is very good today, thus she will continue on Amlodipine 10 mg daily, Enalapril 20 mg daily, Aldactone 25 mg daily, and Toprol XL 25 mg daily. She will continue to follow her blood pressure at home and understands the importance of diet modification.  She states that her blood pressure has been very good today. I will be checking a CMP to evaluate for any adverse effects to her potassium and creatinine.\par 3. Her goal LDL is less than 70. Her LDL most recently was above goal and her triglycerides were elevated as well.  She will modify her diet. She will continue on Atorvastatin 40 mg daily. I will be checking a lipid profile and LFTs today to evaluate her cholesterol on the higher dose of Lipitor as well as for any adverse effects to her LFTs. \par 4. She will follow up with me in 4 months or sooner should she experience any symptoms in the interim.

## 2021-08-05 NOTE — HISTORY OF PRESENT ILLNESS
[FreeTextEntry1] : Patient is a 66 year old woman with a history of coronary artery disease status post stents to the OM1 and LAD 1/2014, HTN, hyperlipidemia, diet- controlled Diabetes mellitus, and family history of coronary artery disease who presents today for follow up of HTN and coronary artery disease. She states that she has been feeling denying any chest pain, dyspnea, palpitations, headaches, and dizziness. She states that she completed her COVID vaccine series at the end of February. She mentions experiencing intermittent allergies.

## 2021-08-05 NOTE — REASON FOR VISIT
[Coronary Artery Disease] : coronary artery disease [Hypertension] : hypertension [Hyperlipidemia] : hyperlipidemia

## 2021-08-05 NOTE — PHYSICAL EXAM
[General Appearance - Well Developed] : well developed [Normal Appearance] : normal appearance [Well Groomed] : well groomed [General Appearance - Well Nourished] : well nourished [No Deformities] : no deformities [General Appearance - In No Acute Distress] : no acute distress [Normal Conjunctiva] : the conjunctiva exhibited no abnormalities [Normal Jugular Venous A Waves Present] : normal jugular venous A waves present [Normal Jugular Venous V Waves Present] : normal jugular venous V waves present [No Jugular Venous Clemons A Waves] : no jugular venous clemons A waves [Respiration, Rhythm And Depth] : normal respiratory rhythm and effort [Exaggerated Use Of Accessory Muscles For Inspiration] : no accessory muscle use [Auscultation Breath Sounds / Voice Sounds] : lungs were clear to auscultation bilaterally [Bowel Sounds] : normal bowel sounds [Abdomen Soft] : soft [Abdomen Tenderness] : non-tender [Abnormal Walk] : normal gait [Nail Clubbing] : no clubbing of the fingernails [Petechial Hemorrhages (___cm)] : no petechial hemorrhages [Cyanosis, Localized] : no localized cyanosis [Skin Color & Pigmentation] : normal skin color and pigmentation [] : no rash [No Skin Ulcers] : no skin ulcer [Oriented To Time, Place, And Person] : oriented to person, place, and time [Affect] : the affect was normal [Mood] : the mood was normal [No Anxiety] : not feeling anxious [Normal Rate] : normal [Rhythm Regular] : regular [Normal S1] : normal S1 [Normal S2] : normal S2 [II] : a grade 2 [No Pitting Edema] : no pitting edema present [FreeTextEntry1] : She was wearing a face mask during the examination.  [S3] : no S3 [I] : a grade 1 [Right Carotid Bruit] : no bruit heard over the right carotid [Left Carotid Bruit] : no bruit heard over the left carotid [Bruit] : no bruit heard

## 2021-08-20 LAB
25(OH)D3 SERPL-MCNC: 18.9 NG/ML
BASOPHILS # BLD AUTO: 0.03 K/UL
BASOPHILS NFR BLD AUTO: 0.5 %
CHOLEST SERPL-MCNC: 241 MG/DL
EOSINOPHIL # BLD AUTO: 0.23 K/UL
EOSINOPHIL NFR BLD AUTO: 3.5 %
ESTIMATED AVERAGE GLUCOSE: 140 MG/DL
HBA1C MFR BLD HPLC: 6.5 %
HCT VFR BLD CALC: 37.3 %
HDLC SERPL-MCNC: 46 MG/DL
HGB BLD-MCNC: 12 G/DL
IMM GRANULOCYTES NFR BLD AUTO: 0.2 %
LDLC SERPL CALC-MCNC: 152 MG/DL
LYMPHOCYTES # BLD AUTO: 1.71 K/UL
LYMPHOCYTES NFR BLD AUTO: 25.8 %
MAN DIFF?: NORMAL
MCHC RBC-ENTMCNC: 28.6 PG
MCHC RBC-ENTMCNC: 32.2 GM/DL
MCV RBC AUTO: 89 FL
MONOCYTES # BLD AUTO: 0.39 K/UL
MONOCYTES NFR BLD AUTO: 5.9 %
NEUTROPHILS # BLD AUTO: 4.25 K/UL
NEUTROPHILS NFR BLD AUTO: 64.1 %
NONHDLC SERPL-MCNC: 195 MG/DL
PLATELET # BLD AUTO: 267 K/UL
RBC # BLD: 4.19 M/UL
RBC # FLD: 13.7 %
TRIGL SERPL-MCNC: 212 MG/DL
TSH SERPL-ACNC: 1.84 UIU/ML
WBC # FLD AUTO: 6.62 K/UL

## 2021-08-24 ENCOUNTER — NON-APPOINTMENT (OUTPATIENT)
Age: 66
End: 2021-08-24

## 2021-08-24 ENCOUNTER — APPOINTMENT (OUTPATIENT)
Dept: OPHTHALMOLOGY | Facility: CLINIC | Age: 66
End: 2021-08-24
Payer: COMMERCIAL

## 2021-08-24 PROCEDURE — 92014 COMPRE OPH EXAM EST PT 1/>: CPT

## 2021-08-24 PROCEDURE — 92201 OPSCPY EXTND RTA DRAW UNI/BI: CPT

## 2021-08-27 LAB
ALBUMIN SERPL ELPH-MCNC: 4.4 G/DL
ALP BLD-CCNC: 88 U/L
ALT SERPL-CCNC: 15 U/L
ANION GAP SERPL CALC-SCNC: 13 MMOL/L
AST SERPL-CCNC: 14 U/L
BILIRUB SERPL-MCNC: 0.4 MG/DL
BUN SERPL-MCNC: 17 MG/DL
CALCIUM SERPL-MCNC: 10.1 MG/DL
CHLORIDE SERPL-SCNC: 107 MMOL/L
CO2 SERPL-SCNC: 23 MMOL/L
CREAT SERPL-MCNC: 0.78 MG/DL
GLUCOSE SERPL-MCNC: 127 MG/DL
POTASSIUM SERPL-SCNC: 4.3 MMOL/L
PROT SERPL-MCNC: 7.2 G/DL
SODIUM SERPL-SCNC: 143 MMOL/L

## 2021-12-02 ENCOUNTER — APPOINTMENT (OUTPATIENT)
Dept: CARDIOLOGY | Facility: CLINIC | Age: 66
End: 2021-12-02
Payer: COMMERCIAL

## 2021-12-02 ENCOUNTER — NON-APPOINTMENT (OUTPATIENT)
Age: 66
End: 2021-12-02

## 2021-12-02 VITALS
OXYGEN SATURATION: 100 % | WEIGHT: 197 LBS | DIASTOLIC BLOOD PRESSURE: 71 MMHG | BODY MASS INDEX: 36.03 KG/M2 | HEART RATE: 54 BPM | SYSTOLIC BLOOD PRESSURE: 147 MMHG

## 2021-12-02 VITALS — DIASTOLIC BLOOD PRESSURE: 84 MMHG | SYSTOLIC BLOOD PRESSURE: 126 MMHG

## 2021-12-02 PROCEDURE — 99214 OFFICE O/P EST MOD 30 MIN: CPT | Mod: 25

## 2021-12-02 PROCEDURE — 93000 ELECTROCARDIOGRAM COMPLETE: CPT

## 2021-12-02 NOTE — DISCUSSION/SUMMARY
[FreeTextEntry1] : IMPRESSION: Ms. GUILLORY is a 66 year -old woman with a  history of coronary artery disease, HTN, hyperlipidemia, type 2 Diabetes mellitus, and family history of coronary artery disease who presents today for follow up of HTN, hyperlipidemia, and coronary artery disease.\par \par PLAN:\par 1. She had a nuclear stress test a little more than 3 years ago that did not reveal any infarct or ischemia. She will continue on ASA 81 mg daily. She has not experienced any episodes of chest pain since her last visit with me. We discussed scheduling her for a stress test, however, she wants to hold off and discuss at the time of her next visit given the increased cases of COVID. Her ECG was normal today. \par 2. Her blood pressure is adequate today, thus she will continue on Amlodipine 10 mg daily, Enalapril 20 mg daily, Aldactone 25 mg daily, and Toprol XL 25 mg daily. She will continue to follow her blood pressure at home and understands the importance of diet modification.  I will be checking a CMP to evaluate for any adverse effects to her potassium and creatinine.\par 3. Her goal LDL is less than 70. Her LDL most recently was elevated. She states that she has been taking her Lipitor 40 mg daily.  She will continue on diet modification. I will be checking her LFTs to rule out any adverse effects to her LFTs. \par 4. She will follow up with me in 4 months or sooner should she experience any symptoms in the interim.

## 2021-12-02 NOTE — HISTORY OF PRESENT ILLNESS
[FreeTextEntry1] : Patient is a 66 year old woman with a history of coronary artery disease status post stents to the OM1 and LAD 1/2014, HTN, hyperlipidemia, diet- controlled Diabetes mellitus, and family history of coronary artery disease who presents today for follow up of HTN, hyperlipidemia, and coronary artery disease. She states that she has been feeling well from the cardiac standpoint denying any chest pain, dyspnea, palpitations, headaches, and dizziness. She states that she had floaters in August and was found to have a torn retina and required laser surgery. She states that the floaters persist, but are not as bad. She states that she had the COVID vaccine booster on 11/19/2021. She states that she has been taking her Lipitor on a daily basis.

## 2021-12-02 NOTE — PHYSICAL EXAM
at bedtime [General Appearance - Well Developed] : well developed [Normal Appearance] : normal appearance [Well Groomed] : well groomed [General Appearance - Well Nourished] : well nourished [No Deformities] : no deformities [General Appearance - In No Acute Distress] : no acute distress [Normal Conjunctiva] : the conjunctiva exhibited no abnormalities [Normal Jugular Venous A Waves Present] : normal jugular venous A waves present [Normal Jugular Venous V Waves Present] : normal jugular venous V waves present [No Jugular Venous Clemons A Waves] : no jugular venous clemons A waves [Respiration, Rhythm And Depth] : normal respiratory rhythm and effort [Exaggerated Use Of Accessory Muscles For Inspiration] : no accessory muscle use [Auscultation Breath Sounds / Voice Sounds] : lungs were clear to auscultation bilaterally [Bowel Sounds] : normal bowel sounds [Abdomen Soft] : soft [Abdomen Tenderness] : non-tender [Abnormal Walk] : normal gait [Nail Clubbing] : no clubbing of the fingernails [Cyanosis, Localized] : no localized cyanosis [Petechial Hemorrhages (___cm)] : no petechial hemorrhages [Skin Color & Pigmentation] : normal skin color and pigmentation [] : no rash [No Skin Ulcers] : no skin ulcer [Oriented To Time, Place, And Person] : oriented to person, place, and time [Affect] : the affect was normal [Mood] : the mood was normal [No Anxiety] : not feeling anxious [Rhythm Regular] : regular [Normal S1] : normal S1 [Normal S2] : normal S2 [I] : a grade 1 [II] : a grade 2 [No Pitting Edema] : no pitting edema present [FreeTextEntry1] : She was wearing a face mask during the examination.  [Bradycardia] : bradycardic [S3] : no S3 [Right Carotid Bruit] : no bruit heard over the right carotid [Left Carotid Bruit] : no bruit heard over the left carotid [Bruit] : no bruit heard

## 2021-12-03 LAB
25(OH)D3 SERPL-MCNC: 26.6 NG/ML
ALBUMIN SERPL ELPH-MCNC: 4.6 G/DL
ALP BLD-CCNC: 89 U/L
ALT SERPL-CCNC: 15 U/L
ANION GAP SERPL CALC-SCNC: 13 MMOL/L
AST SERPL-CCNC: 15 U/L
BASOPHILS # BLD AUTO: 0.03 K/UL
BASOPHILS NFR BLD AUTO: 0.5 %
BILIRUB SERPL-MCNC: 0.4 MG/DL
BUN SERPL-MCNC: 17 MG/DL
CALCIUM SERPL-MCNC: 9.7 MG/DL
CHLORIDE SERPL-SCNC: 105 MMOL/L
CO2 SERPL-SCNC: 23 MMOL/L
CREAT SERPL-MCNC: 0.79 MG/DL
EOSINOPHIL # BLD AUTO: 0.23 K/UL
EOSINOPHIL NFR BLD AUTO: 3.6 %
ESTIMATED AVERAGE GLUCOSE: 146 MG/DL
GLUCOSE SERPL-MCNC: 129 MG/DL
HBA1C MFR BLD HPLC: 6.7 %
HCT VFR BLD CALC: 40 %
HGB BLD-MCNC: 12.6 G/DL
IMM GRANULOCYTES NFR BLD AUTO: 0.3 %
LYMPHOCYTES # BLD AUTO: 1.57 K/UL
LYMPHOCYTES NFR BLD AUTO: 24.8 %
MAN DIFF?: NORMAL
MCHC RBC-ENTMCNC: 28.6 PG
MCHC RBC-ENTMCNC: 31.5 GM/DL
MCV RBC AUTO: 90.7 FL
MONOCYTES # BLD AUTO: 0.44 K/UL
MONOCYTES NFR BLD AUTO: 7 %
NEUTROPHILS # BLD AUTO: 4.03 K/UL
NEUTROPHILS NFR BLD AUTO: 63.8 %
PLATELET # BLD AUTO: 244 K/UL
POTASSIUM SERPL-SCNC: 4.3 MMOL/L
PROT SERPL-MCNC: 7.5 G/DL
RBC # BLD: 4.41 M/UL
RBC # FLD: 13.9 %
SODIUM SERPL-SCNC: 141 MMOL/L
TSH SERPL-ACNC: 1.89 UIU/ML
WBC # FLD AUTO: 6.32 K/UL

## 2021-12-06 LAB
CHOLEST SERPL-MCNC: 292 MG/DL
HDLC SERPL-MCNC: 46 MG/DL
LDLC SERPL CALC-MCNC: 206 MG/DL
NONHDLC SERPL-MCNC: 246 MG/DL
TRIGL SERPL-MCNC: 201 MG/DL

## 2021-12-06 RX ORDER — ATORVASTATIN CALCIUM 80 MG/1
80 TABLET, FILM COATED ORAL
Qty: 30 | Refills: 2 | Status: ACTIVE | COMMUNITY
Start: 2019-03-28 | End: 1900-01-01

## 2022-04-06 ENCOUNTER — RESULT CHARGE (OUTPATIENT)
Age: 67
End: 2022-04-06

## 2022-04-07 ENCOUNTER — APPOINTMENT (OUTPATIENT)
Dept: CARDIOLOGY | Facility: CLINIC | Age: 67
End: 2022-04-07
Payer: COMMERCIAL

## 2022-04-07 ENCOUNTER — NON-APPOINTMENT (OUTPATIENT)
Age: 67
End: 2022-04-07

## 2022-04-07 VITALS — HEART RATE: 57 BPM | OXYGEN SATURATION: 98 % | WEIGHT: 197 LBS | BODY MASS INDEX: 36.25 KG/M2 | HEIGHT: 62 IN

## 2022-04-07 VITALS — SYSTOLIC BLOOD PRESSURE: 131 MMHG | DIASTOLIC BLOOD PRESSURE: 80 MMHG

## 2022-04-07 PROCEDURE — 93000 ELECTROCARDIOGRAM COMPLETE: CPT

## 2022-04-07 PROCEDURE — 99214 OFFICE O/P EST MOD 30 MIN: CPT | Mod: 25

## 2022-04-07 NOTE — HISTORY OF PRESENT ILLNESS
[FreeTextEntry1] : Patient is a 67 year old woman with a history of coronary artery disease status post stents to the OM1 and LAD 1/2014, HTN, hyperlipidemia, diet- controlled Diabetes mellitus, and family history of coronary artery disease who presents today for follow up of HTN, hyperlipidemia, and coronary artery disease. She states that she has been feeling well from the cardiac standpoint denying any chest pain, dyspnea, palpitations, headaches, and dizziness. She states that she had floaters in August and was found to have a torn retina and required laser surgery. She states that the floaters persist, but are not as bad. She has modified her diet as her last cholesterol was very high. She states that she has been taking her Lipitor on a daily basis.

## 2022-04-07 NOTE — DISCUSSION/SUMMARY
[FreeTextEntry1] : IMPRESSION: Ms. GUILLORY is a 67 year -old woman with a  history of coronary artery disease, HTN, hyperlipidemia, type 2 Diabetes mellitus, and family history of coronary artery disease who presents today for follow up of HTN, hyperlipidemia, and coronary artery disease.\par \par PLAN:\par 1. She had a nuclear stress test about 3 and a half years ago that did not reveal any infarct or ischemia. She will continue on ASA 81 mg daily. I have asked her to schedule a nuclear stress test to evaluate for any ischemia. Her ECG was normal today. \par 2. Her blood pressure is adequate today, thus she will continue on Amlodipine 10 mg daily, Enalapril 20 mg daily, Aldactone 25 mg daily, and Toprol XL 25 mg daily. She will continue to follow her blood pressure at home and understands the importance of diet modification.  I will be checking a CMP to evaluate for any adverse effects to her potassium and creatinine.\par 3. Her goal LDL is less than 70. Her LDL most recently was elevated. She states that she has been taking her Lipitor 80 mg daily.  She will continue on diet modification. I will be checking her LFTs to rule out any adverse effects to her LFTs as well as a repeat lipid profile. \par 4. She will follow up with me in 4 months or sooner should she experience any symptoms in the interim.

## 2022-04-07 NOTE — PHYSICAL EXAM
[General Appearance - Well Developed] : well developed [Normal Appearance] : normal appearance [Well Groomed] : well groomed [General Appearance - Well Nourished] : well nourished [No Deformities] : no deformities [General Appearance - In No Acute Distress] : no acute distress [Normal Conjunctiva] : the conjunctiva exhibited no abnormalities [Normal Jugular Venous A Waves Present] : normal jugular venous A waves present [Normal Jugular Venous V Waves Present] : normal jugular venous V waves present [No Jugular Venous Clemons A Waves] : no jugular venous clemons A waves [Respiration, Rhythm And Depth] : normal respiratory rhythm and effort [Exaggerated Use Of Accessory Muscles For Inspiration] : no accessory muscle use [Auscultation Breath Sounds / Voice Sounds] : lungs were clear to auscultation bilaterally [Bowel Sounds] : normal bowel sounds [Abdomen Soft] : soft [Abdomen Tenderness] : non-tender [Abnormal Walk] : normal gait [Nail Clubbing] : no clubbing of the fingernails [Cyanosis, Localized] : no localized cyanosis [Petechial Hemorrhages (___cm)] : no petechial hemorrhages [Skin Color & Pigmentation] : normal skin color and pigmentation [] : no rash [No Skin Ulcers] : no skin ulcer [Oriented To Time, Place, And Person] : oriented to person, place, and time [Affect] : the affect was normal [Mood] : the mood was normal [No Anxiety] : not feeling anxious [Bradycardia] : bradycardic [Rhythm Regular] : regular [Normal S1] : normal S1 [Normal S2] : normal S2 [I] : a grade 1 [II] : a grade 2 [No Pitting Edema] : no pitting edema present [FreeTextEntry1] : She was wearing a face mask during the examination.  [S3] : no S3 [Right Carotid Bruit] : no bruit heard over the right carotid [Left Carotid Bruit] : no bruit heard over the left carotid [Bruit] : no bruit heard

## 2022-04-21 ENCOUNTER — OUTPATIENT (OUTPATIENT)
Dept: OUTPATIENT SERVICES | Facility: HOSPITAL | Age: 67
LOS: 1 days | End: 2022-04-21
Payer: COMMERCIAL

## 2022-04-21 ENCOUNTER — APPOINTMENT (OUTPATIENT)
Dept: CV DIAGNOSTICS | Facility: HOSPITAL | Age: 67
End: 2022-04-21

## 2022-04-21 DIAGNOSIS — Z95.5 PRESENCE OF CORONARY ANGIOPLASTY IMPLANT AND GRAFT: Chronic | ICD-10-CM

## 2022-04-21 DIAGNOSIS — Z90.710 ACQUIRED ABSENCE OF BOTH CERVIX AND UTERUS: Chronic | ICD-10-CM

## 2022-04-21 DIAGNOSIS — Z98.89 OTHER SPECIFIED POSTPROCEDURAL STATES: Chronic | ICD-10-CM

## 2022-04-21 DIAGNOSIS — I25.10 ATHEROSCLEROTIC HEART DISEASE OF NATIVE CORONARY ARTERY WITHOUT ANGINA PECTORIS: ICD-10-CM

## 2022-04-21 PROCEDURE — 78452 HT MUSCLE IMAGE SPECT MULT: CPT | Mod: 26,MH

## 2022-04-21 PROCEDURE — 93016 CV STRESS TEST SUPVJ ONLY: CPT

## 2022-04-21 PROCEDURE — 93018 CV STRESS TEST I&R ONLY: CPT

## 2022-04-21 PROCEDURE — 93017 CV STRESS TEST TRACING ONLY: CPT

## 2022-04-21 PROCEDURE — 78452 HT MUSCLE IMAGE SPECT MULT: CPT | Mod: MH

## 2022-04-21 PROCEDURE — A9500: CPT

## 2022-05-03 LAB
25(OH)D3 SERPL-MCNC: 23.4 NG/ML
ALBUMIN SERPL ELPH-MCNC: 4.4 G/DL
ALP BLD-CCNC: 87 U/L
ALT SERPL-CCNC: 11 U/L
ANION GAP SERPL CALC-SCNC: 12 MMOL/L
AST SERPL-CCNC: 16 U/L
BASOPHILS # BLD AUTO: 0.03 K/UL
BASOPHILS NFR BLD AUTO: 0.5 %
BILIRUB SERPL-MCNC: 0.4 MG/DL
BUN SERPL-MCNC: 13 MG/DL
CALCIUM SERPL-MCNC: 9.6 MG/DL
CHLORIDE SERPL-SCNC: 106 MMOL/L
CHOLEST SERPL-MCNC: 186 MG/DL
CO2 SERPL-SCNC: 24 MMOL/L
CREAT SERPL-MCNC: 0.81 MG/DL
EGFR: 80 ML/MIN/1.73M2
EOSINOPHIL # BLD AUTO: 0.23 K/UL
EOSINOPHIL NFR BLD AUTO: 3.6 %
ESTIMATED AVERAGE GLUCOSE: 143 MG/DL
GLUCOSE SERPL-MCNC: 128 MG/DL
HBA1C MFR BLD HPLC: 6.6 %
HCT VFR BLD CALC: 39 %
HDLC SERPL-MCNC: 42 MG/DL
HGB BLD-MCNC: 12.8 G/DL
IMM GRANULOCYTES NFR BLD AUTO: 0.2 %
LDLC SERPL CALC-MCNC: 114 MG/DL
LYMPHOCYTES # BLD AUTO: 1.55 K/UL
LYMPHOCYTES NFR BLD AUTO: 24.4 %
MAN DIFF?: NORMAL
MCHC RBC-ENTMCNC: 29.7 PG
MCHC RBC-ENTMCNC: 32.8 GM/DL
MCV RBC AUTO: 90.5 FL
MONOCYTES # BLD AUTO: 0.42 K/UL
MONOCYTES NFR BLD AUTO: 6.6 %
NEUTROPHILS # BLD AUTO: 4.11 K/UL
NEUTROPHILS NFR BLD AUTO: 64.7 %
NONHDLC SERPL-MCNC: 144 MG/DL
PLATELET # BLD AUTO: 231 K/UL
POTASSIUM SERPL-SCNC: 4.3 MMOL/L
PROT SERPL-MCNC: 7.4 G/DL
RBC # BLD: 4.31 M/UL
RBC # FLD: 12.7 %
SODIUM SERPL-SCNC: 142 MMOL/L
TRIGL SERPL-MCNC: 151 MG/DL
TSH SERPL-ACNC: 1.78 UIU/ML
WBC # FLD AUTO: 6.35 K/UL

## 2022-05-15 ENCOUNTER — OUTPATIENT (OUTPATIENT)
Dept: OUTPATIENT SERVICES | Facility: HOSPITAL | Age: 67
LOS: 1 days | End: 2022-05-15
Payer: COMMERCIAL

## 2022-05-15 DIAGNOSIS — Z98.89 OTHER SPECIFIED POSTPROCEDURAL STATES: Chronic | ICD-10-CM

## 2022-05-15 DIAGNOSIS — Z90.710 ACQUIRED ABSENCE OF BOTH CERVIX AND UTERUS: Chronic | ICD-10-CM

## 2022-05-15 DIAGNOSIS — Z11.52 ENCOUNTER FOR SCREENING FOR COVID-19: ICD-10-CM

## 2022-05-15 DIAGNOSIS — Z95.5 PRESENCE OF CORONARY ANGIOPLASTY IMPLANT AND GRAFT: Chronic | ICD-10-CM

## 2022-05-15 LAB — SARS-COV-2 RNA SPEC QL NAA+PROBE: SIGNIFICANT CHANGE UP

## 2022-05-15 PROCEDURE — U0005: CPT

## 2022-05-15 PROCEDURE — U0003: CPT

## 2022-05-15 PROCEDURE — C9803: CPT

## 2022-05-18 ENCOUNTER — INPATIENT (INPATIENT)
Facility: HOSPITAL | Age: 67
LOS: 0 days | Discharge: HOME | DRG: 247 | End: 2022-05-19
Attending: INTERNAL MEDICINE | Admitting: INTERNAL MEDICINE
Payer: COMMERCIAL

## 2022-05-18 VITALS
HEART RATE: 59 BPM | TEMPERATURE: 98 F | DIASTOLIC BLOOD PRESSURE: 76 MMHG | OXYGEN SATURATION: 99 % | SYSTOLIC BLOOD PRESSURE: 168 MMHG

## 2022-05-18 DIAGNOSIS — Z90.710 ACQUIRED ABSENCE OF BOTH CERVIX AND UTERUS: Chronic | ICD-10-CM

## 2022-05-18 DIAGNOSIS — Z98.89 OTHER SPECIFIED POSTPROCEDURAL STATES: Chronic | ICD-10-CM

## 2022-05-18 DIAGNOSIS — Z95.5 PRESENCE OF CORONARY ANGIOPLASTY IMPLANT AND GRAFT: Chronic | ICD-10-CM

## 2022-05-18 DIAGNOSIS — I25.10 ATHEROSCLEROTIC HEART DISEASE OF NATIVE CORONARY ARTERY WITHOUT ANGINA PECTORIS: ICD-10-CM

## 2022-05-18 LAB
ANION GAP SERPL CALC-SCNC: 15 MMOL/L — SIGNIFICANT CHANGE UP (ref 5–17)
BUN SERPL-MCNC: 17 MG/DL — SIGNIFICANT CHANGE UP (ref 7–23)
CALCIUM SERPL-MCNC: 9.4 MG/DL — SIGNIFICANT CHANGE UP (ref 8.4–10.5)
CHLORIDE SERPL-SCNC: 104 MMOL/L — SIGNIFICANT CHANGE UP (ref 96–108)
CO2 SERPL-SCNC: 21 MMOL/L — LOW (ref 22–31)
CREAT SERPL-MCNC: 0.74 MG/DL — SIGNIFICANT CHANGE UP (ref 0.5–1.3)
EGFR: 89 ML/MIN/1.73M2 — SIGNIFICANT CHANGE UP
GLUCOSE SERPL-MCNC: 146 MG/DL — HIGH (ref 70–99)
HCT VFR BLD CALC: 38 % — SIGNIFICANT CHANGE UP (ref 34.5–45)
HGB BLD-MCNC: 12.6 G/DL — SIGNIFICANT CHANGE UP (ref 11.5–15.5)
MCHC RBC-ENTMCNC: 30.3 PG — SIGNIFICANT CHANGE UP (ref 27–34)
MCHC RBC-ENTMCNC: 33.2 GM/DL — SIGNIFICANT CHANGE UP (ref 32–36)
MCV RBC AUTO: 91.3 FL — SIGNIFICANT CHANGE UP (ref 80–100)
NRBC # BLD: 0 /100 WBCS — SIGNIFICANT CHANGE UP (ref 0–0)
PLATELET # BLD AUTO: 199 K/UL — SIGNIFICANT CHANGE UP (ref 150–400)
POTASSIUM SERPL-MCNC: 6 MMOL/L — HIGH (ref 3.5–5.3)
POTASSIUM SERPL-SCNC: 6 MMOL/L — HIGH (ref 3.5–5.3)
RBC # BLD: 4.16 M/UL — SIGNIFICANT CHANGE UP (ref 3.8–5.2)
RBC # FLD: 12.7 % — SIGNIFICANT CHANGE UP (ref 10.3–14.5)
SODIUM SERPL-SCNC: 140 MMOL/L — SIGNIFICANT CHANGE UP (ref 135–145)
WBC # BLD: 6.06 K/UL — SIGNIFICANT CHANGE UP (ref 3.8–10.5)
WBC # FLD AUTO: 6.06 K/UL — SIGNIFICANT CHANGE UP (ref 3.8–10.5)

## 2022-05-18 PROCEDURE — 93010 ELECTROCARDIOGRAM REPORT: CPT

## 2022-05-18 PROCEDURE — 92921: CPT | Mod: NC,LD

## 2022-05-18 PROCEDURE — 93454 CORONARY ARTERY ANGIO S&I: CPT | Mod: 26,59

## 2022-05-18 PROCEDURE — 93010 ELECTROCARDIOGRAM REPORT: CPT | Mod: 77

## 2022-05-18 PROCEDURE — 92928 PRQ TCAT PLMT NTRAC ST 1 LES: CPT | Mod: LD

## 2022-05-18 RX ORDER — SPIRONOLACTONE 25 MG/1
25 TABLET, FILM COATED ORAL DAILY
Refills: 0 | Status: DISCONTINUED | OUTPATIENT
Start: 2022-05-18 | End: 2022-05-19

## 2022-05-18 RX ORDER — ASPIRIN/CALCIUM CARB/MAGNESIUM 324 MG
81 TABLET ORAL DAILY
Refills: 0 | Status: DISCONTINUED | OUTPATIENT
Start: 2022-05-19 | End: 2022-05-19

## 2022-05-18 RX ORDER — SODIUM CHLORIDE 9 MG/ML
1000 INJECTION INTRAMUSCULAR; INTRAVENOUS; SUBCUTANEOUS
Refills: 0 | Status: DISCONTINUED | OUTPATIENT
Start: 2022-05-18 | End: 2022-05-19

## 2022-05-18 RX ORDER — GABAPENTIN 400 MG/1
2 CAPSULE ORAL
Qty: 0 | Refills: 0 | DISCHARGE

## 2022-05-18 RX ORDER — AMLODIPINE BESYLATE 2.5 MG/1
10 TABLET ORAL DAILY
Refills: 0 | Status: DISCONTINUED | OUTPATIENT
Start: 2022-05-18 | End: 2022-05-19

## 2022-05-18 RX ORDER — CLOPIDOGREL BISULFATE 75 MG/1
75 TABLET, FILM COATED ORAL DAILY
Refills: 0 | Status: DISCONTINUED | OUTPATIENT
Start: 2022-05-19 | End: 2022-05-19

## 2022-05-18 RX ORDER — ATORVASTATIN CALCIUM 80 MG/1
80 TABLET, FILM COATED ORAL AT BEDTIME
Refills: 0 | Status: DISCONTINUED | OUTPATIENT
Start: 2022-05-18 | End: 2022-05-19

## 2022-05-18 RX ORDER — ATORVASTATIN CALCIUM 80 MG/1
1 TABLET, FILM COATED ORAL
Qty: 0 | Refills: 0 | DISCHARGE

## 2022-05-18 RX ORDER — METOPROLOL TARTRATE 50 MG
25 TABLET ORAL DAILY
Refills: 0 | Status: DISCONTINUED | OUTPATIENT
Start: 2022-05-18 | End: 2022-05-19

## 2022-05-18 RX ORDER — SODIUM CHLORIDE 9 MG/ML
250 INJECTION INTRAMUSCULAR; INTRAVENOUS; SUBCUTANEOUS ONCE
Refills: 0 | Status: COMPLETED | OUTPATIENT
Start: 2022-05-18 | End: 2022-05-18

## 2022-05-18 RX ADMIN — SPIRONOLACTONE 25 MILLIGRAM(S): 25 TABLET, FILM COATED ORAL at 15:50

## 2022-05-18 RX ADMIN — AMLODIPINE BESYLATE 10 MILLIGRAM(S): 2.5 TABLET ORAL at 15:50

## 2022-05-18 RX ADMIN — Medication 20 MILLIGRAM(S): at 15:50

## 2022-05-18 RX ADMIN — SODIUM CHLORIDE 500 MILLILITER(S): 9 INJECTION INTRAMUSCULAR; INTRAVENOUS; SUBCUTANEOUS at 09:11

## 2022-05-18 RX ADMIN — SODIUM CHLORIDE 250 MILLILITER(S): 9 INJECTION INTRAMUSCULAR; INTRAVENOUS; SUBCUTANEOUS at 15:49

## 2022-05-18 RX ADMIN — ATORVASTATIN CALCIUM 80 MILLIGRAM(S): 80 TABLET, FILM COATED ORAL at 22:03

## 2022-05-18 RX ADMIN — Medication 25 MILLIGRAM(S): at 15:50

## 2022-05-18 NOTE — H&P CARDIOLOGY - HISTORY OF PRESENT ILLNESS
66yo F with PMhx CAD s/p stents to OM1 & Left anterior descending artery , HTN, HLD, diet controlled DM, CAD presents for [facility] .  Nuclear Stress test performed 4/21/22 :     IMPRESSIONS: Abnormal Study  * Symptom: headache, shortness of breath.  * HR Response: Appropriate; BP Response: Appropriate.  * Heart Rhythm: Sinus Bradycardia - Occassional APD's - 46  BPM.  * Baseline ECG: Nonspecific ST-T wave abnormality.  * ECG Changes: ST Depression: Approximately 0.5 mm in  leads II, III, aVF, V3-V6 started at 01:50 min of infusion  at HR of 86 bpm and persisted 5:50 min into recovery.  * Arrhythmia: Rare APDs occurred at rest. A single VPD  occurred during recovery.  * The left ventricle was normal in size.  * There are large, mild defects in the anterior, basal to  mid anteroseptal, and basal to mid anterolateral walls  that are reversible suggestive of ischemia.  * There are large, mild to moderate defects in the  inferoseptal, distal inferior, inferoapical, and apical  lateral walls that are mostly reversible suggestive of  ischemia with partial scarring.  * Post-stress gated wall motion analysis was performed  (LVEF = 66 %;LVEDV = 76 ml.) revealing hypokinesis of the  basal septal wall and reduced systolic thickening of the  distal inferior, inferoapical, and apical lateral walls.    No fever or chills. No N/V/D or abd pain.  66yo F with PMhx CAD s/p stents to OM1 & Left anterior descending artery 1/2014 , HTN, HLD, diet controlled DM, CAD presents for [facility] .  She had a routine follow up with Dr Tolentino and had a  Nuclear Stress test performed on 4/21/22 :     IMPRESSIONS: Abnormal Study  * Symptom: headache, shortness of breath.  * HR Response: Appropriate; BP Response: Appropriate.  * Heart Rhythm: Sinus Bradycardia - Occassional APD's - 46  BPM.  * Baseline ECG: Nonspecific ST-T wave abnormality.  * ECG Changes: ST Depression: Approximately 0.5 mm in  leads II, III, aVF, V3-V6 started at 01:50 min of infusion  at HR of 86 bpm and persisted 5:50 min into recovery.  * Arrhythmia: Rare APDs occurred at rest. A single VPD  occurred during recovery.  * The left ventricle was normal in size.  * There are large, mild defects in the anterior, basal to  mid anteroseptal, and basal to mid anterolateral walls  that are reversible suggestive of ischemia.  * There are large, mild to moderate defects in the  inferoseptal, distal inferior, inferoapical, and apical  lateral walls that are mostly reversible suggestive of  ischemia with partial scarring.  * Post-stress gated wall motion analysis was performed  (LVEF = 66 %;LVEDV = 76 ml.) revealing hypokinesis of the  basal septal wall and reduced systolic thickening of the  distal inferior, inferoapical, and apical lateral walls.    No fever or chills. No N/V/D or abd pain.  No CP, palpitations, SOB, DUNCAN.

## 2022-05-18 NOTE — CHART NOTE - NSCHARTNOTEFT_GEN_A_CORE
Removal of Femoral Sheath by Anahi KEARNEY    Pulses in the right lower extremity are palpable. The patient was placed in the supine position. The insertion site was identified and the sutures were removed per protocol.  The femoral sheath was then removed. Direct pressure was applied for 20 minutes.     Monitoring of the right groin and both lower extremities including neuro-vascular checks and vital signs every 15 minutes x 4, then every 30 minutes x 2, then every 1 hour was ordered.    Complications: None    Comments: Site soft, non tender, no hematoma or bleeding; Instructions reviewed with pt, will cont to monitor.

## 2022-05-18 NOTE — H&P CARDIOLOGY - NSICDXPASTMEDICALHX_GEN_ALL_CORE_FT
PAST MEDICAL HISTORY:  Arthritis     Breast cancer L, treated with lumpectomy in 2011    CAD (coronary artery disease)     Hyperlipemia     Hypertension

## 2022-05-18 NOTE — ASU PATIENT PROFILE, ADULT - FALL HARM RISK - UNIVERSAL INTERVENTIONS
Bed in lowest position, wheels locked, appropriate side rails in place/Call bell, personal items and telephone in reach/Instruct patient to call for assistance before getting out of bed or chair/Non-slip footwear when patient is out of bed/[place] to call system/Physically safe environment - no spills, clutter or unnecessary equipment/Purposeful Proactive Rounding/Room/bathroom lighting operational, light cord in reach

## 2022-05-19 ENCOUNTER — TRANSCRIPTION ENCOUNTER (OUTPATIENT)
Age: 67
End: 2022-05-19

## 2022-05-19 VITALS
OXYGEN SATURATION: 97 % | HEART RATE: 62 BPM | SYSTOLIC BLOOD PRESSURE: 145 MMHG | RESPIRATION RATE: 18 BRPM | TEMPERATURE: 98 F | DIASTOLIC BLOOD PRESSURE: 54 MMHG

## 2022-05-19 DIAGNOSIS — E78.5 HYPERLIPIDEMIA, UNSPECIFIED: ICD-10-CM

## 2022-05-19 DIAGNOSIS — I25.10 ATHEROSCLEROTIC HEART DISEASE OF NATIVE CORONARY ARTERY WITHOUT ANGINA PECTORIS: ICD-10-CM

## 2022-05-19 DIAGNOSIS — I10 ESSENTIAL (PRIMARY) HYPERTENSION: ICD-10-CM

## 2022-05-19 LAB
ANION GAP SERPL CALC-SCNC: 12 MMOL/L — SIGNIFICANT CHANGE UP (ref 5–17)
BUN SERPL-MCNC: 11 MG/DL — SIGNIFICANT CHANGE UP (ref 7–23)
CALCIUM SERPL-MCNC: 8.8 MG/DL — SIGNIFICANT CHANGE UP (ref 8.4–10.5)
CHLORIDE SERPL-SCNC: 107 MMOL/L — SIGNIFICANT CHANGE UP (ref 96–108)
CO2 SERPL-SCNC: 20 MMOL/L — LOW (ref 22–31)
CREAT SERPL-MCNC: 0.63 MG/DL — SIGNIFICANT CHANGE UP (ref 0.5–1.3)
EGFR: 97 ML/MIN/1.73M2 — SIGNIFICANT CHANGE UP
GLUCOSE SERPL-MCNC: 159 MG/DL — HIGH (ref 70–99)
POTASSIUM SERPL-MCNC: 3.9 MMOL/L — SIGNIFICANT CHANGE UP (ref 3.5–5.3)
POTASSIUM SERPL-SCNC: 3.9 MMOL/L — SIGNIFICANT CHANGE UP (ref 3.5–5.3)
SODIUM SERPL-SCNC: 139 MMOL/L — SIGNIFICANT CHANGE UP (ref 135–145)

## 2022-05-19 PROCEDURE — 92928 PRQ TCAT PLMT NTRAC ST 1 LES: CPT | Mod: RC

## 2022-05-19 PROCEDURE — C1894: CPT

## 2022-05-19 PROCEDURE — 92978 ENDOLUMINL IVUS OCT C 1ST: CPT | Mod: RC

## 2022-05-19 PROCEDURE — 93005 ELECTROCARDIOGRAM TRACING: CPT

## 2022-05-19 PROCEDURE — C1874: CPT

## 2022-05-19 PROCEDURE — 99152 MOD SED SAME PHYS/QHP 5/>YRS: CPT

## 2022-05-19 PROCEDURE — C1769: CPT

## 2022-05-19 PROCEDURE — 93010 ELECTROCARDIOGRAM REPORT: CPT

## 2022-05-19 PROCEDURE — 80048 BASIC METABOLIC PNL TOTAL CA: CPT

## 2022-05-19 PROCEDURE — C9600: CPT | Mod: LD

## 2022-05-19 PROCEDURE — C1887: CPT

## 2022-05-19 PROCEDURE — 85027 COMPLETE CBC AUTOMATED: CPT

## 2022-05-19 PROCEDURE — 99153 MOD SED SAME PHYS/QHP EA: CPT

## 2022-05-19 PROCEDURE — 92978 ENDOLUMINL IVUS OCT C 1ST: CPT | Mod: 26,RC

## 2022-05-19 PROCEDURE — 93454 CORONARY ARTERY ANGIO S&I: CPT | Mod: 59

## 2022-05-19 PROCEDURE — C1753: CPT

## 2022-05-19 PROCEDURE — C1725: CPT

## 2022-05-19 PROCEDURE — 92921: CPT | Mod: LD

## 2022-05-19 PROCEDURE — 36415 COLL VENOUS BLD VENIPUNCTURE: CPT

## 2022-05-19 RX ORDER — CLOPIDOGREL BISULFATE 75 MG/1
1 TABLET, FILM COATED ORAL
Qty: 90 | Refills: 3
Start: 2022-05-19 | End: 2023-05-13

## 2022-05-19 RX ADMIN — AMLODIPINE BESYLATE 10 MILLIGRAM(S): 2.5 TABLET ORAL at 12:13

## 2022-05-19 RX ADMIN — CLOPIDOGREL BISULFATE 75 MILLIGRAM(S): 75 TABLET, FILM COATED ORAL at 05:44

## 2022-05-19 RX ADMIN — Medication 81 MILLIGRAM(S): at 05:44

## 2022-05-19 NOTE — PROGRESS NOTE ADULT - SUBJECTIVE AND OBJECTIVE BOX
HPI:  68yo F with PMhx CAD s/p stents to OM1 & Left anterior descending artery 1/2014 , HTN, HLD, diet controlled DM, CAD presents for [facility] .  She had a routine follow up with Dr Tolentino and had a  Nuclear Stress test performed on 4/21/22 :       distal inferior, inferoapical, and apical lateral walls.    No fever or chills. No N/V/D or abd pain.  No CP, palpitations, SOB, DUNCAN.  (18 May 2022 07:49)    Patient is a 67y old  Female who presents with a chief complaint of         Allergies    adhesives (Rash)  Dilaudid (Other)  morphine (Pruritus)    Intolerances        Medications:  amLODIPine   Tablet 10 milliGRAM(s) Oral daily  aspirin enteric coated 81 milliGRAM(s) Oral daily  atorvastatin 80 milliGRAM(s) Oral at bedtime  clopidogrel Tablet 75 milliGRAM(s) Oral daily  enalapril 20 milliGRAM(s) Oral daily  metoprolol succinate ER 25 milliGRAM(s) Oral daily  sodium chloride 0.9%. 1000 milliLiter(s) IV Continuous <Continuous>  sodium chloride 0.9%. 1000 milliLiter(s) IV Continuous <Continuous>  spironolactone 25 milliGRAM(s) Oral daily      Vitals:  T(C): 36.3 (05-18-22 @ 12:30), Max: 36.8 (05-18-22 @ 07:49)  HR: 56 (05-18-22 @ 20:20) (53 - 78)  BP: 120/82 (05-18-22 @ 20:20) (93/71 - 168/76)  BP(mean): 87 (05-18-22 @ 20:20) (67 - 118)  RR: 16 (05-18-22 @ 20:20) (13 - 24)  SpO2: 99% (05-18-22 @ 20:20) (97% - 100%)  Wt(kg): --  Daily Height in cm: 157.48 (18 May 2022 08:06)    Daily   I&O's Summary        Physical Exam:  Appearance: Normal  Eyes: PERRL, EOMI  HENT: Normal oral mus[place]a, NC/AT  Cardiovascular: S1S2, RRR, No M/R/G, no JVD, No Lower extremity edema  Procedural Access Site: No hematoma, Non-tender to palpation, 2+ pulse, No bruit, No Ecchymosis  Respiratory: Clear to auscultation bilaterally  Gastrointestinal: Soft, Non tender, Normal Bowel Sounds  Musculoskeletal: No clubbing, No joint deformity   Neurologic: Non-focal  Lymphatic: No lymphadenopathy  Psychiatry: AAOx3, Mood & affect appropriate  Skin: No rashes, No ecchymoses, No cyanosis    05-18    140  |  104  |  17  ----------------------------<  146<H>  6.0<H>   |  21<L>  |  0.74    Ca    9.4      18 May 2022 08:20              Lipid panel   Hgb A1c                         12.6   6.06  )-----------( 199      ( 18 May 2022 08:20 )             38.0

## 2022-05-19 NOTE — PROGRESS NOTE ADULT - ASSESSMENT
66 y/o female with above pmhx, 5/18 one stent to the LAD, POBA to the diagonal. Staged PCI to the RCA on 5/19.

## 2022-05-19 NOTE — ASU DISCHARGE PLAN (ADULT/PEDIATRIC) - ASU DC SPECIAL INSTRUCTIONSFT

## 2022-05-19 NOTE — ASU DISCHARGE PLAN (ADULT/PEDIATRIC) - CARE PROVIDER_API CALL
Piter Tolentino)  Cardiovascular Disease; Nuclear Cardiology  [address] [place] [address], [address]  [place] [address]  Phone: [phone]  Fax: [phone]  Follow Up Time:

## 2022-05-19 NOTE — DISCHARGE NOTE NURSING/CASE MANAGEMENT/SOCIAL WORK - NSPROEXTENSIONSOFSELF_GEN_A_NUR
Body Location Override (Optional - Billing Will Still Be Based On Selected Body Map Location If Applicable): left upper dorsum of nose Add 97323 Cpt? (Important Note: In 2017 The Use Of 08061 Is Being Tracked By Cms To Determine Future Global Period Reimbursement For Global Periods): yes Detail Level: Detailed none

## 2022-05-19 NOTE — CHART NOTE - NSCHARTNOTEFT_GEN_A_CORE
Removal of Femoral Sheath    Pulses in the (right) lower extremity are (palpable). The patient was placed in the supine position. The insertion site was identified and the sutures were removed per protocol.  The _6_French femoral sheath was then removed. Direct pressure was applied for  __20____ minutes.     Monitoring of the (right) groin and both lower extremities including neuro-vascular checks and vital signs every 15 minutes x 4, then every 30 minutes x 2, then every 1 hour was ordered.    Complications: None    Comments:

## 2022-05-19 NOTE — DISCHARGE NOTE NURSING/CASE MANAGEMENT/SOCIAL WORK - PATIENT PORTAL LINK FT
You can access the FollowMyHealth Patient Portal offered by [facility] by registering at the following website: [email or web address]. By joining [employer]’s FollowMyHealth portal, you will also be able to view your health information using other applications (apps) compatible with our system.

## 2022-05-19 NOTE — ASU DISCHARGE PLAN (ADULT/PEDIATRIC) - NS MD DC FALL RISK RISK
For information on Fall & Injury Prevention, visit: [email or web address] OR  [email or web address] OR  https://www.cdc.gov/steadi/patient.html

## 2022-05-19 NOTE — DISCHARGE NOTE NURSING/CASE MANAGEMENT/SOCIAL WORK - NSDCPEFALRISK_GEN_ALL_CORE
For information on Fall & Injury Prevention, visit: [email or web address] OR  [email or web address] OR  https://www.cdc.gov/steadi/patient.html

## 2022-06-07 ENCOUNTER — APPOINTMENT (OUTPATIENT)
Dept: CARDIOLOGY | Facility: CLINIC | Age: 67
End: 2022-06-07

## 2022-06-07 ENCOUNTER — NON-APPOINTMENT (OUTPATIENT)
Age: 67
End: 2022-06-07

## 2022-06-07 VITALS
DIASTOLIC BLOOD PRESSURE: 73 MMHG | BODY MASS INDEX: 36.25 KG/M2 | SYSTOLIC BLOOD PRESSURE: 147 MMHG | RESPIRATION RATE: 12 BRPM | HEIGHT: 62 IN | HEART RATE: 68 BPM | WEIGHT: 197 LBS | OXYGEN SATURATION: 98 %

## 2022-06-07 VITALS — DIASTOLIC BLOOD PRESSURE: 62 MMHG | SYSTOLIC BLOOD PRESSURE: 134 MMHG

## 2022-06-07 DIAGNOSIS — R06.02 SHORTNESS OF BREATH: ICD-10-CM

## 2022-06-07 PROCEDURE — 99214 OFFICE O/P EST MOD 30 MIN: CPT | Mod: 25

## 2022-06-07 PROCEDURE — 93000 ELECTROCARDIOGRAM COMPLETE: CPT

## 2022-06-16 LAB
25(OH)D3 SERPL-MCNC: 39.1 NG/ML
ALBUMIN SERPL ELPH-MCNC: 4.5 G/DL
ALP BLD-CCNC: 77 U/L
ALT SERPL-CCNC: 13 U/L
ANION GAP SERPL CALC-SCNC: 17 MMOL/L
AST SERPL-CCNC: 19 U/L
BASOPHILS # BLD AUTO: 0.02 K/UL
BASOPHILS NFR BLD AUTO: 0.4 %
BILIRUB SERPL-MCNC: 0.3 MG/DL
BUN SERPL-MCNC: 15 MG/DL
CALCIUM SERPL-MCNC: 9.6 MG/DL
CHLORIDE SERPL-SCNC: 104 MMOL/L
CO2 SERPL-SCNC: 20 MMOL/L
CREAT SERPL-MCNC: 0.8 MG/DL
EGFR: 81 ML/MIN/1.73M2
EOSINOPHIL # BLD AUTO: 0.18 K/UL
EOSINOPHIL NFR BLD AUTO: 3.2 %
ESTIMATED AVERAGE GLUCOSE: 143 MG/DL
GLUCOSE SERPL-MCNC: 121 MG/DL
HBA1C MFR BLD HPLC: 6.6 %
HCT VFR BLD CALC: 36.9 %
HGB BLD-MCNC: 12 G/DL
IMM GRANULOCYTES NFR BLD AUTO: 0.2 %
LYMPHOCYTES # BLD AUTO: 1.24 K/UL
LYMPHOCYTES NFR BLD AUTO: 22.1 %
MAN DIFF?: NORMAL
MCHC RBC-ENTMCNC: 30 PG
MCHC RBC-ENTMCNC: 32.5 GM/DL
MCV RBC AUTO: 92.3 FL
MONOCYTES # BLD AUTO: 0.35 K/UL
MONOCYTES NFR BLD AUTO: 6.3 %
NEUTROPHILS # BLD AUTO: 3.8 K/UL
NEUTROPHILS NFR BLD AUTO: 67.8 %
PLATELET # BLD AUTO: 216 K/UL
POTASSIUM SERPL-SCNC: 4.1 MMOL/L
PROT SERPL-MCNC: 7.7 G/DL
RBC # BLD: 4 M/UL
RBC # FLD: 13.2 %
SODIUM SERPL-SCNC: 141 MMOL/L
TSH SERPL-ACNC: 1.94 UIU/ML
WBC # FLD AUTO: 5.6 K/UL

## 2022-06-17 ENCOUNTER — APPOINTMENT (OUTPATIENT)
Dept: CARDIOLOGY | Facility: CLINIC | Age: 67
End: 2022-06-17
Payer: COMMERCIAL

## 2022-06-17 LAB
CHOLEST SERPL-MCNC: 191 MG/DL
HDLC SERPL-MCNC: 43 MG/DL
LDLC SERPL CALC-MCNC: 118 MG/DL
NONHDLC SERPL-MCNC: 149 MG/DL
TRIGL SERPL-MCNC: 152 MG/DL

## 2022-06-17 PROCEDURE — 93306 TTE W/DOPPLER COMPLETE: CPT

## 2022-08-04 ENCOUNTER — NON-APPOINTMENT (OUTPATIENT)
Age: 67
End: 2022-08-04

## 2022-08-04 ENCOUNTER — APPOINTMENT (OUTPATIENT)
Dept: CARDIOLOGY | Facility: CLINIC | Age: 67
End: 2022-08-04

## 2022-08-04 VITALS — SYSTOLIC BLOOD PRESSURE: 136 MMHG | DIASTOLIC BLOOD PRESSURE: 86 MMHG

## 2022-08-04 VITALS
BODY MASS INDEX: 36.07 KG/M2 | SYSTOLIC BLOOD PRESSURE: 145 MMHG | WEIGHT: 196 LBS | HEIGHT: 62 IN | DIASTOLIC BLOOD PRESSURE: 76 MMHG | OXYGEN SATURATION: 96 % | HEART RATE: 69 BPM

## 2022-08-04 PROCEDURE — 93000 ELECTROCARDIOGRAM COMPLETE: CPT

## 2022-08-04 PROCEDURE — 99214 OFFICE O/P EST MOD 30 MIN: CPT | Mod: 25

## 2022-08-05 LAB
25(OH)D3 SERPL-MCNC: 32.4 NG/ML
ALBUMIN SERPL ELPH-MCNC: 4.5 G/DL
ALP BLD-CCNC: 95 U/L
ALT SERPL-CCNC: 17 U/L
ANION GAP SERPL CALC-SCNC: 14 MMOL/L
AST SERPL-CCNC: 19 U/L
BASOPHILS # BLD AUTO: 0.03 K/UL
BASOPHILS NFR BLD AUTO: 0.5 %
BILIRUB SERPL-MCNC: 0.5 MG/DL
BUN SERPL-MCNC: 14 MG/DL
CALCIUM SERPL-MCNC: 10 MG/DL
CHLORIDE SERPL-SCNC: 106 MMOL/L
CO2 SERPL-SCNC: 23 MMOL/L
CREAT SERPL-MCNC: 0.82 MG/DL
EGFR: 78 ML/MIN/1.73M2
EOSINOPHIL # BLD AUTO: 0.26 K/UL
EOSINOPHIL NFR BLD AUTO: 4 %
ESTIMATED AVERAGE GLUCOSE: 143 MG/DL
GLUCOSE SERPL-MCNC: 131 MG/DL
HBA1C MFR BLD HPLC: 6.6 %
HCT VFR BLD CALC: 37.3 %
HGB BLD-MCNC: 12.7 G/DL
IMM GRANULOCYTES NFR BLD AUTO: 0.3 %
LYMPHOCYTES # BLD AUTO: 1.42 K/UL
LYMPHOCYTES NFR BLD AUTO: 22.1 %
MAN DIFF?: NORMAL
MCHC RBC-ENTMCNC: 30.5 PG
MCHC RBC-ENTMCNC: 34 GM/DL
MCV RBC AUTO: 89.7 FL
MONOCYTES # BLD AUTO: 0.37 K/UL
MONOCYTES NFR BLD AUTO: 5.8 %
NEUTROPHILS # BLD AUTO: 4.33 K/UL
NEUTROPHILS NFR BLD AUTO: 67.3 %
PLATELET # BLD AUTO: 243 K/UL
POTASSIUM SERPL-SCNC: 4.5 MMOL/L
PROT SERPL-MCNC: 7.7 G/DL
RBC # BLD: 4.16 M/UL
RBC # FLD: 13 %
SODIUM SERPL-SCNC: 143 MMOL/L
TSH SERPL-ACNC: 1.66 UIU/ML
WBC # FLD AUTO: 6.43 K/UL

## 2022-08-06 NOTE — PHYSICAL EXAM
[General Appearance - Well Developed] : well developed [Normal Appearance] : normal appearance [Well Groomed] : well groomed [General Appearance - Well Nourished] : well nourished [No Deformities] : no deformities [General Appearance - In No Acute Distress] : no acute distress [Normal Conjunctiva] : the conjunctiva exhibited no abnormalities [Normal Jugular Venous A Waves Present] : normal jugular venous A waves present [Normal Jugular Venous V Waves Present] : normal jugular venous V waves present [No Jugular Venous Clemons A Waves] : no jugular venous clemons A waves [Respiration, Rhythm And Depth] : normal respiratory rhythm and effort [Exaggerated Use Of Accessory Muscles For Inspiration] : no accessory muscle use [Auscultation Breath Sounds / Voice Sounds] : lungs were clear to auscultation bilaterally [Bowel Sounds] : normal bowel sounds [Abdomen Soft] : soft [Abdomen Tenderness] : non-tender [Abnormal Walk] : normal gait [Nail Clubbing] : no clubbing of the fingernails [Cyanosis, Localized] : no localized cyanosis [Petechial Hemorrhages (___cm)] : no petechial hemorrhages [Skin Color & Pigmentation] : normal skin color and pigmentation [] : no rash [No Skin Ulcers] : no skin ulcer [Oriented To Time, Place, And Person] : oriented to person, place, and time [Affect] : the affect was normal [Mood] : the mood was normal [No Anxiety] : not feeling anxious [Rhythm Regular] : regular [Normal S1] : normal S1 [Normal S2] : normal S2 [I] : a grade 1 [II] : a grade 2 [No Pitting Edema] : no pitting edema present [Normal Rate] : normal [FreeTextEntry1] : She was wearing a face mask during the examination.  [S3] : no S3 [Right Carotid Bruit] : no bruit heard over the right carotid [Left Carotid Bruit] : no bruit heard over the left carotid [Bruit] : no bruit heard

## 2022-08-06 NOTE — CARDIOLOGY SUMMARY
[___] : [unfilled] [LVEF ___%] : LVEF [unfilled]% [None] : no pulmonary hypertension [___] : [unfilled] [de-identified] : Pharmacologic Nuclear Stress Test performed on 4/21/2022: There are large, mild defects in the anterior, basal to mid anteroseptal, and basal to mid anterolateral walls that are reversible suggestive of ischemia. There are large, mild to moderate defects in the\par inferoseptal, distal inferior, inferoapical, and apical lateral walls that are mostly reversible suggestive of ischemia with partial scarring. LVEF= 66%, revealing hypokinesis of the basal septal wall and reduced systolic thickening of the distal inferior, inferoapical, and apical lateral walls. [de-identified] : Cardiac catheterization performed on 5/18/2022: There is a 70 % stenosis of the first diagonal. Mid left anterior descending artery has a 90 % stenosis. Proximal circumflex has a 40 % stenosis. Distal right coronary artery has an 80 % stenosis within the\par stented segment. \par \par 5/18/2022: Balloon angioplasty of the first diagonal. \par 2.75 x 15 SAPPHIRE Drug Eluting Stent placed to the mid LAD. \par \par 5/19/2022: \par 3.5 x 16 SYNERGY LEONCIO placed to the distal right coronary artery.

## 2022-08-06 NOTE — PHYSICAL EXAM
[General Appearance - Well Developed] : well developed [Normal Appearance] : normal appearance [Well Groomed] : well groomed [General Appearance - Well Nourished] : well nourished [No Deformities] : no deformities [General Appearance - In No Acute Distress] : no acute distress [Normal Conjunctiva] : the conjunctiva exhibited no abnormalities [Normal Jugular Venous A Waves Present] : normal jugular venous A waves present [Normal Jugular Venous V Waves Present] : normal jugular venous V waves present [No Jugular Venous Clemons A Waves] : no jugular venous clemons A waves [Respiration, Rhythm And Depth] : normal respiratory rhythm and effort [Exaggerated Use Of Accessory Muscles For Inspiration] : no accessory muscle use [Auscultation Breath Sounds / Voice Sounds] : lungs were clear to auscultation bilaterally [Bowel Sounds] : normal bowel sounds [Abdomen Soft] : soft [Abdomen Tenderness] : non-tender [Abnormal Walk] : normal gait [Nail Clubbing] : no clubbing of the fingernails [Cyanosis, Localized] : no localized cyanosis [Petechial Hemorrhages (___cm)] : no petechial hemorrhages [Skin Color & Pigmentation] : normal skin color and pigmentation [] : no rash [No Skin Ulcers] : no skin ulcer [Oriented To Time, Place, And Person] : oriented to person, place, and time [Affect] : the affect was normal [Mood] : the mood was normal [No Anxiety] : not feeling anxious [Rhythm Regular] : regular [Normal S1] : normal S1 [Normal S2] : normal S2 [I] : a grade 1 [II] : a grade 2 [No Pitting Edema] : no pitting edema present [2+] : right 2+ [Normal Rate] : normal [FreeTextEntry1] : She was wearing a face mask during the examination.  [S3] : no S3 [Right Carotid Bruit] : no bruit heard over the right carotid [Left Carotid Bruit] : no bruit heard over the left carotid [Bruit] : no bruit heard

## 2022-08-06 NOTE — HISTORY OF PRESENT ILLNESS
[FreeTextEntry1] : Patient is a 67 year old woman with a history of coronary artery disease status post stents to the OM1 and LAD 1/2014, HTN, hyperlipidemia, diet- controlled Diabetes mellitus, family history of coronary artery disease, and nos status post LEONCIO to the mid LAD and and distal RCA 5/2022 who presents today for follow up of coronary artery disease. She continues to feel tired, with some improvement. She otherwise denies any chest pain, dyspnea at rest, palpitations, headaches, and dizziness.

## 2022-08-06 NOTE — DISCUSSION/SUMMARY
[EKG obtained to assist in diagnosis and management of assessed problem(s)] : EKG obtained to assist in diagnosis and management of assessed problem(s) [FreeTextEntry1] : IMPRESSION: Ms. GUILLORY is a 67 year old woman with a history of coronary artery disease status post stents to the OM1 and LAD 1/2014, HTN, hyperlipidemia, diet- controlled Diabetes mellitus, family history of coronary artery disease, and nos status post LEONCIO to the mid LAD and and distal RCA last month who presents today for follow up of coronary artery disease.\par \par PLAN:\par 1. She is status post two stents placed a little less than 3 months ago. She will continue on ASA 81 mg daily and Plavix 75 mg daily. Her ECG was normal today.   \par 2. Her blood pressure was OK when it was repeated at the time of the physical examination, although not ideal. She will continue on Amlodipine 10 mg daily, Enalapril 20 mg daily, Aldactone 25 mg daily, and Toprol XL 25 mg daily. She will continue to follow her blood pressure at home and understands the importance of diet modification.  I will be checking a CMP to evaluate for any adverse effects to her potassium and creatinine.\par 3. Her goal LDL is less than 70. Her LDL most recently was above goal. She will continue on Lipitor 80 mg daily and I will be checking a lipid profile and CMP today.  She will also continue on diet modification. If her LDL is not at goal, will consider adding Zetia 10 mg daily.  \par 4. She will follow up with me in 3 months or sooner should she experience any new or worsening symptoms in the interim.

## 2022-08-06 NOTE — CARDIOLOGY SUMMARY
[___] : [unfilled] [LVEF ___%] : LVEF [unfilled]% [None] : no pulmonary hypertension [de-identified] : Pharmacologic Nuclear Stress Test performed on 4/21/2022: There are large, mild defects in the anterior, basal to mid anteroseptal, and basal to mid anterolateral walls that are reversible suggestive of ischemia. There are large, mild to moderate defects in the\par inferoseptal, distal inferior, inferoapical, and apical lateral walls that are mostly reversible suggestive of ischemia with partial scarring. LVEF= 66%, revealing hypokinesis of the basal septal wall and reduced systolic thickening of the distal inferior, inferoapical, and apical lateral walls. [de-identified] : 6/17/2022: Mild mitral regurgitation. Mild aortic stenosis. Mild aortic regurgitation. Moderate diastolic dysfunction. Normal left ventricular systolic function. EF is approximately 62%. Minimal tricuspid regurgitation. No pulmonary HTN. PASP= 26 mmHg. [de-identified] : Cardiac catheterization performed on 5/18/2022: There is a 70 % stenosis of the first diagonal. Mid left anterior descending artery has a 90 % stenosis. Proximal circumflex has a 40 % stenosis. Distal right coronary artery has an 80 % stenosis within the\par stented segment. \par \par 5/18/2022: Balloon angioplasty of the first diagonal. \par 2.75 x 15 SAPPHIRE Drug Eluting Stent placed to the mid LAD. \par \par 5/19/2022: \par 3.5 x 16 SYNERGY LEONCIO placed to the distal right coronary artery.

## 2022-08-06 NOTE — HISTORY OF PRESENT ILLNESS
[FreeTextEntry1] : Patient is a 67 year old woman with a history of coronary artery disease status post stents to the OM1 and LAD 1/2014, HTN, hyperlipidemia, diet- controlled Diabetes mellitus, family history of coronary artery disease, and nos status post LEONCIO to the mid LAD and and distal RCA last month who presents today for follow up of coronary artery disease following stent placement. She states that she has been feeling more tired since she had the stent placed. She state states that she has felt more tired since she had her stents placed and has found herself taking deeper breaths. She otherwise denies any chest pain, palpitations, headaches, and dizziness.

## 2022-08-06 NOTE — DISCUSSION/SUMMARY
[FreeTextEntry1] : IMPRESSION: Ms. GUILLORY is a 67 year old woman with a history of coronary artery disease status post stents to the OM1 and LAD 1/2014, HTN, hyperlipidemia, diet- controlled Diabetes mellitus, family history of coronary artery disease, and nos status post LEONCIO to the mid LAD and and distal RCA last month who presents today for follow up of coronary artery disease following stent placement.\par \par PLAN:\par 1. She is status post two stents placed last month. She will continue on ASA 81 mg daily and Plavix 75 mg daily. I have asked her to schedule an echocardiogram given her shortness of breath. She was in sinus rhythm on her ECG that was performed today.  \par 2. Her blood pressure is OK today, thus she will continue on Amlodipine 10 mg daily, Enalapril 20 mg daily, Aldactone 25 mg daily, and Toprol XL 25 mg daily. She will continue to follow her blood pressure at home and understands the importance of diet modification.  I will be checking a CMP to evaluate for any adverse effects to her potassium and creatinine.\par 3. Her goal LDL is less than 70. Her LDL most recently was above goal. She will continue on Lipitor 80 mg daily and I will be checking a lipid profile and CMP today.  She will also continue on diet modification.  \par 4. She will follow up with me as scheduled in 2 months or sooner should she experience any new or worsening symptoms in the interim.\par 5. I spent approximately 35 minutes with the patient during this encounter, including documentation.

## 2022-08-08 LAB
CHOLEST SERPL-MCNC: 177 MG/DL
HDLC SERPL-MCNC: 44 MG/DL
LDLC SERPL CALC-MCNC: 103 MG/DL
NONHDLC SERPL-MCNC: 134 MG/DL
TRIGL SERPL-MCNC: 155 MG/DL

## 2022-08-08 RX ORDER — EZETIMIBE 10 MG/1
10 TABLET ORAL DAILY
Qty: 90 | Refills: 0 | Status: ACTIVE | COMMUNITY
Start: 2022-08-08 | End: 1900-01-01

## 2022-11-28 NOTE — ED PROVIDER NOTE - MDM ORDERS SUBMITTED SELECTION
Imaging Studies/Labs continue monitoring H/H while lovenox restarted  plan for 24h monitoring of H/H and will consider transitioning to eliquis or keeping lovenox.  urology reccs appreciated  h/h is stable

## 2022-12-01 ENCOUNTER — APPOINTMENT (OUTPATIENT)
Dept: CARDIOLOGY | Facility: CLINIC | Age: 67
End: 2022-12-01

## 2022-12-01 ENCOUNTER — NON-APPOINTMENT (OUTPATIENT)
Age: 67
End: 2022-12-01

## 2022-12-01 VITALS
HEIGHT: 62 IN | OXYGEN SATURATION: 98 % | BODY MASS INDEX: 35.7 KG/M2 | RESPIRATION RATE: 12 BRPM | TEMPERATURE: 97.9 F | WEIGHT: 194 LBS | DIASTOLIC BLOOD PRESSURE: 84 MMHG | HEART RATE: 59 BPM | SYSTOLIC BLOOD PRESSURE: 151 MMHG

## 2022-12-01 VITALS — DIASTOLIC BLOOD PRESSURE: 70 MMHG | SYSTOLIC BLOOD PRESSURE: 128 MMHG

## 2022-12-01 LAB
BASOPHILS # BLD AUTO: 0.02 K/UL
BASOPHILS NFR BLD AUTO: 0.3 %
EOSINOPHIL # BLD AUTO: 0.24 K/UL
EOSINOPHIL NFR BLD AUTO: 3.6 %
HCT VFR BLD CALC: 36.9 %
HGB BLD-MCNC: 12 G/DL
IMM GRANULOCYTES NFR BLD AUTO: 0.3 %
LYMPHOCYTES # BLD AUTO: 1.31 K/UL
LYMPHOCYTES NFR BLD AUTO: 19.8 %
MAN DIFF?: NORMAL
MCHC RBC-ENTMCNC: 29.6 PG
MCHC RBC-ENTMCNC: 32.5 GM/DL
MCV RBC AUTO: 90.9 FL
MONOCYTES # BLD AUTO: 0.48 K/UL
MONOCYTES NFR BLD AUTO: 7.2 %
NEUTROPHILS # BLD AUTO: 4.56 K/UL
NEUTROPHILS NFR BLD AUTO: 68.8 %
PLATELET # BLD AUTO: 217 K/UL
RBC # BLD: 4.06 M/UL
RBC # FLD: 13.2 %
WBC # FLD AUTO: 6.63 K/UL

## 2022-12-01 PROCEDURE — 99214 OFFICE O/P EST MOD 30 MIN: CPT | Mod: 25

## 2022-12-01 PROCEDURE — 93000 ELECTROCARDIOGRAM COMPLETE: CPT

## 2022-12-01 NOTE — HISTORY OF PRESENT ILLNESS
[FreeTextEntry1] : Patient is a 67 year old woman with a history of coronary artery disease status post stents to the OM1 and LAD 1/2014 and status post LEONCIO to the mid LAD and and distal RCA 5/2022, HTN, hyperlipidemia, diet- controlled Diabetes mellitus, and family history of coronary artery disease who presents today for follow up of coronary artery disease. She states that she feels less tired since starting on Vitamin D and 2 Fish oil tablets. Her biggest concern is that she was recently diagnosed with plantar fascitis on the left. She otherwise denies any chest pain, dyspnea at rest, palpitations, headaches, and dizziness.

## 2022-12-01 NOTE — DISCUSSION/SUMMARY
[FreeTextEntry1] : IMPRESSION: Ms. GUILLORY is a 67 year old woman with a history of coronary artery disease status post stents to the OM1 and LAD 1/2014 and status post drug-eluting stent to the mid LAD and distal RCA 5/18/2022, HTN, hyperlipidemia, diet- controlled Diabetes mellitus, and family history of coronary artery disease who presents today for follow up of coronary artery disease, hypertension, and hyperlipidemia.\par \par PLAN:\par 1. She is status post two stents placed a little more than 6 months ago.   She will continue on ASA 81 mg daily and Plavix 75 mg daily. Her ECG was bradycardic otherwise normal today.   \par 2. Her blood pressure was good when it was repeated at the time of the physical examination. She will continue on Amlodipine 10 mg daily, Enalapril 20 mg daily, Aldactone 25 mg daily, and Toprol XL 25 mg daily. She will continue to follow her blood pressure at home and understands the importance of diet modification.  I will be checking a CMP to evaluate for any adverse effects to her potassium and creatinine.\par 3. Her goal LDL is less than 70. Her LDL most recently was above goal. She will continue on Lipitor 80 mg daily and Zetia 10 mg daily.  I will be checking a lipid profile and CMP today.  She will also continue on diet modification.   \par 4. She will follow up with me in 4 months or sooner should she experience any symptoms in the interim. [EKG obtained to assist in diagnosis and management of assessed problem(s)] : EKG obtained to assist in diagnosis and management of assessed problem(s)

## 2022-12-01 NOTE — PHYSICAL EXAM
[General Appearance - Well Developed] : well developed [Normal Appearance] : normal appearance [Well Groomed] : well groomed [General Appearance - Well Nourished] : well nourished [No Deformities] : no deformities [General Appearance - In No Acute Distress] : no acute distress [Normal Conjunctiva] : the conjunctiva exhibited no abnormalities [Normal Jugular Venous A Waves Present] : normal jugular venous A waves present [Normal Jugular Venous V Waves Present] : normal jugular venous V waves present [No Jugular Venous Clemons A Waves] : no jugular venous clemons A waves [Respiration, Rhythm And Depth] : normal respiratory rhythm and effort [Exaggerated Use Of Accessory Muscles For Inspiration] : no accessory muscle use [Auscultation Breath Sounds / Voice Sounds] : lungs were clear to auscultation bilaterally [Bowel Sounds] : normal bowel sounds [Abdomen Soft] : soft [Abdomen Tenderness] : non-tender [Abnormal Walk] : normal gait [Nail Clubbing] : no clubbing of the fingernails [Cyanosis, Localized] : no localized cyanosis [Petechial Hemorrhages (___cm)] : no petechial hemorrhages [Skin Color & Pigmentation] : normal skin color and pigmentation [] : no rash [No Skin Ulcers] : no skin ulcer [Oriented To Time, Place, And Person] : oriented to person, place, and time [Affect] : the affect was normal [Mood] : the mood was normal [No Anxiety] : not feeling anxious [Normal Rate] : normal [Rhythm Regular] : regular [Normal S1] : normal S1 [Normal S2] : normal S2 [I] : a grade 1 [II] : a grade 2 [No Pitting Edema] : no pitting edema present [FreeTextEntry1] : She was wearing a face mask during the examination.  [S3] : no S3 [Right Carotid Bruit] : no bruit heard over the right carotid [Left Carotid Bruit] : no bruit heard over the left carotid [Bruit] : no bruit heard

## 2022-12-01 NOTE — CARDIOLOGY SUMMARY
[___] : [unfilled] [LVEF ___%] : LVEF [unfilled]% [None] : no pulmonary hypertension [___] : [unfilled] [de-identified] : Pharmacologic Nuclear Stress Test performed on 4/21/2022: There are large, mild defects in the anterior, basal to mid anteroseptal, and basal to mid anterolateral walls that are reversible suggestive of ischemia. There are large, mild to moderate defects in the\par inferoseptal, distal inferior, inferoapical, and apical lateral walls that are mostly reversible suggestive of ischemia with partial scarring. LVEF= 66%, revealing hypokinesis of the basal septal wall and reduced systolic thickening of the distal inferior, inferoapical, and apical lateral walls. [de-identified] : 6/17/2022: Mild mitral regurgitation. Mild aortic stenosis. Mild aortic regurgitation. Moderate diastolic dysfunction. Normal left ventricular systolic function. EF is approximately 62%. Minimal tricuspid regurgitation. No pulmonary HTN. PASP= 26 mmHg. [de-identified] : Cardiac catheterization performed on 5/18/2022: There is a 70 % stenosis of the first diagonal. Mid left anterior descending artery has a 90 % stenosis. Proximal circumflex has a 40 % stenosis. Distal right coronary artery has an 80 % stenosis within the\par stented segment. \par \par 5/18/2022: Balloon angioplasty of the first diagonal. \par 2.75 x 15 SAPPHIRE Drug Eluting Stent placed to the mid LAD. \par \par 5/19/2022: \par 3.5 x 16 SYNERGY LEONCIO placed to the distal right coronary artery.

## 2022-12-12 NOTE — ASU DISCHARGE PLAN (ADULT/PEDIATRIC) - BRAND OF FIRST COVID-19 BOOSTER
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

## 2023-02-09 NOTE — ASU DISCHARGE PLAN (ADULT/PEDIATRIC) - PHYSICIAN SECTION COMPLETE
[address] ENCOUNTER      Pt Name: Vic Salas  MRN: [id number]  [place] 1957  Date of evaluation: 2/9/2023  Provider: Salli Riedel, APRN - CNP  PCP: Sarah Peoples DO  Note Started: 12:01 PM EST 2/9/23    CHIEF COMPLAINT       Chief Complaint   Patient presents with    Headache    Dizziness     HISTORY OF PRESENT ILLNESS: 1 or more Elements   Vic Salas is a 72 y.o. female who presents to the ER with chief complaint of left-sided ataxia that was present when she woke up at 5 AM yesterday morning. .  She states that she was up for about 30 minutes. The symptoms persisted so she went back to bed. She woke up time later and started having nausea with several episodes of vomiting and a headache. She again went back to bed and when she woke up around 1 or 2:00 in the afternoon the ataxia was resolved but she continues to have a headache. She has not taken anything for her headache either yesterday or today. When asked about her medical history patient states that she does not have much medical history however upon review of her chart she has history of type 2 diabetes hyperlipidemia, hypertension, asthma, hep C, and cervical cancer. Patient's father had a CVA    I have reviewed the nursing triage documentation and agree unless otherwise noted. REVIEW OF SYSTEMS :    Review of Systems   Constitutional:  Negative for appetite change, fatigue and fever. HENT:  Negative for congestion, sore throat and trouble swallowing. Respiratory:  Negative for shortness of breath. Cardiovascular:  Negative for chest pain. Gastrointestinal:  Negative for abdominal pain. Genitourinary:  Negative for dysuria. Musculoskeletal:  Negative for myalgias. Skin:  Negative for rash. Neurological:  Negative for weakness and headaches.         Symptoms of of ataxia yesterday,     Positives and Pertinent negatives as per HPI.   SURGICAL HISTORY     Past Surgical History:   Procedure Laterality Date    COLONOSCOPY  2015    diverticulosis    HYSTERECTOMY (CERVIX STATUS UNKNOWN)      LIVER BIOPSY      TONSILLECTOMY         CURRENTMEDICATIONS       Previous Medications    AMLODIPINE (NORVASC) 10 MG TABLET    TAKE 1 TABLET BY MOUTH ONCE DAILY    ATORVASTATIN (LIPITOR) 10 MG TABLET    Take 1 tablet by mouth daily    HYDROXYZINE HCL (ATARAX) 25 MG TABLET    Take one to two tablets at night    LOSARTAN (COZAAR) 100 MG TABLET    Take 1 tablet by mouth daily    METFORMIN (GLUCOPHAGE-XR) 500 MG EXTENDED RELEASE TABLET    TAKE 1 TABLET BY MOUTH THREE TIMES DAILY    SITAGLIPTIN (JANUVIA) 100 MG TABLET    Take 1 tablet by mouth daily       ALLERGIES     Epinephrine, Morphine, Codeine, and Sulfa antibiotics    FAMILYHISTORY       Family History   Problem Relation Age of Onset    Cancer Mother         uterus, breast, bone, brain    Heart Disease Mother     Breast Cancer Mother     High Blood Pressure Father     Stroke Father     Elevated Lipids Sister     Migraines Sister         SOCIAL HISTORY       Social History     Tobacco Use    Smoking status: Former     Packs/day: 0.50     Years: 16.00     Pack years: 8.00     Types: Cigarettes     Quit date: 1986     Years since quittin.2    Smokeless tobacco: Never   Substance Use Topics    Alcohol use: Yes     Comment: \"rarely\"    Drug use: No       SCREENINGS   NIH Stroke Scale  Interval: Reassessment  Level of Consciousness (1a): Alert  LOC Questions (1b): Answers both correctly  LOC Commands (1c): Performs both tasks correctly  Best Gaze (2): Normal  Visual (3): No visual loss  Facial Palsy (4): Normal symmetrical movement  Motor Arm, Left (5a): No drift  Motor Arm, Right (5b): No drift  Motor Leg, Left (6a): No drift  Motor Leg, Right (6b):  No drift  Limb Ataxia (7): Absent  Sensory (8): Normal  Best Language (9): No aphasia  Dysarthria (10): Normal  Extinction and Inattention (11): No abnormality  Total: 0     PHYSICAL EXAM:      ED Triage Vitals   BP Temp Temp src Heart Rate Resp SpO2 Height Weight   02/09/23 0917 02/09/23 0917 -- 02/09/23 0917 02/09/23 0917 02/09/23 0917 02/09/23 1116 02/09/23 0917   (!) 172/97 98.6 °F (37 °C)  88 16 97 % 5' 3\" (1.6 m) 190 lb (86.2 kg)      Physical Exam    Constitutional:  Well developed, Well nourished. No distress  HENT:  Normocephalic, Atraumatic, PERRL. EOMI. Sclera clear. Conjunctiva normal, No discharge. Neck/Lymphatics: supple, no JVD, no swollen nodes  Cardiovascular:   RRR,  no murmurs/rubs/gallops. Respiratory:   Nonlabored breathing. Normal breath sounds, No wheezing  Abdomen: Bowel sounds normal, Soft, No tenderness, no masses. Musculoskeletal:  . Distal cap refill and pulses intact bilateral upper and lower extremities  Bilateral upper and lower extremity ROM intact without pain or obvious deficit  Integument:  Warm, Pink, Dry  Neurologic:  Alert & oriented , No focal deficits noted. Cranial nerves II through XII grossly intact. Normal gross motor coordination & motor strength bilateral upper and lower extremities  Sensation intact. NIH Stroke Scale  Interval: Reassessment  Level of Consciousness (1a): Alert  LOC Questions (1b): Answers both correctly  LOC Commands (1c): Performs both tasks correctly  Best Gaze (2): Normal  Visual (3): No visual loss  Facial Palsy (4): Normal symmetrical movement  Motor Arm, Left (5a): No drift  Motor Arm, Right (5b): No drift  Motor Leg, Left (6a): No drift  Motor Leg, Right (6b):  No drift  Limb Ataxia (7): Absent  Sensory (8): Normal  Best Language (9): No aphasia  Dysarthria (10): Normal  Extinction and Inattention (11): No abnormality  Total: 0   Psychiatric:  Affect normal, Mood normal.     DIAGNOSTIC RESULTS     Labs Reviewed   BASIC METABOLIC PANEL - Abnormal; Notable for the following components:       Result Value    Glucose 154 (*)     All other components within normal limits   CBC WITH AUTO DIFFERENTIAL - Abnormal; Notable for the following components:    Monocytes % 7.1 (*)     All other components within normal limits   PROTIME-INR - Abnormal; Notable for the following components:    Protime 11.2 (*)     All other components within normal limits   POCT GLUCOSE - Abnormal; Notable for the following components:    POC Glucose 152 (*)     All other components within normal limits   POCT GLUCOSE - Normal   COVID-19, RAPID   TROPONIN     I have reviewed and interpreted all of the currently available lab results from this visit (if applicable) Only abnormal lab results are displayed. All other labs were within normal range or not returned as of this dictation. EKG: When ordered, EKG's are interpreted by the Emergency Department Physician in the absence of a cardiologist.  Please see their note for interpretation of EKG. Radiographs (if obtained) as interpreted by me and confirmed with a radiologist report    CTA HEAD NECK W CONTRAST   Final Result   CT Brain:      1. No acute intracranial abnormality. CTA Head/Neck:      1. No evidence of large vessel occlusion, significant stenosis, or aneurysmal   dilation in the major arteries of the head and neck. 2. Multinodular goiter. Recommend correlation with non-emergent thyroid   ultrasound if not previously performed. RECOMMENDATIONS:   Heterogeneous and enlarged thyroid. Recommend thyroid US. Reference: J Am Yandy Radiol. 2015 Feb;12(2): 143-50         CT HEAD WO CONTRAST   Final Result   CT Brain:      1. No acute intracranial abnormality. CTA Head/Neck:      1. No evidence of large vessel occlusion, significant stenosis, or aneurysmal   dilation in the major arteries of the head and neck. 2. Multinodular goiter. Recommend correlation with non-emergent thyroid   ultrasound if not previously performed. RECOMMENDATIONS:   Heterogeneous and enlarged thyroid. Recommend thyroid US. Reference: J Am Yandy Radiol.  2015 Feb;12(2): 143-50           Chart review shows recent radiograph(s):  No results found. PROCEDURES   Unless otherwise noted below, none     Procedures    CRITICAL CARE TIME (.cctime)       PAST MEDICAL HISTORY AND CHRONIC CONDITIONS AFFECTING CARE   Pt  has a past medical history of Asthma, Cancer ([facility] [address] Diabetes mellitus ([facility] [address] Diverticulosis, Fatty liver, Hearing loss, Hepatitis C, Hypertension, Insomnia, and TMJ (dislocation of temporomandibular joint). CC/HPI, SUMMARY, DDx, ED COURSE, AND REASSESSMENT   I am the Primary Clinician of Record. I have seen and evaluated this patient with my supervising physician Jose Enrique Ford MD.    Vitals:    02/09/23 1303 02/09/23 1332 02/09/23 1615 02/09/23 1622   BP: 133/69 129/66 101/73    Pulse: 86 81 73 74   Resp: 20 18  16   Temp:   98.6 °F (37 °C)    TempSrc:   Oral    SpO2: 91% 96% 98% 96%   Weight:       Height:           Is this patient to be included in the SEP-1 Core Measure due to severe sepsis or septic shock? No   Exclusion criteria - the patient is NOT to be included for SEP-1 Core Measure due to: Infection is not suspected    Van Mccoy is an alert and oriented x4, 72 y.o. female who presents to the ER with concerns of a TIA after experiencing ataxia, vomiting and headache for several hours yesterday. Information obtained from the patient. Emergent etiologies considered. Differential diagnosis include: CVA, TIA, other    Van Mccoy has easy nonlabored respirations. Vital signs within acceptable parameters. Patient is afebrile and in no acute distress. Upon arrival to the emergency department patient is asymptomatic. She has an NIH score of 0. Cardiac monitoring and continuous pulse ox ordered to ensure patient remains hemodynamically stable particularly given any drug therapy. Will treat patient for symptoms.      Patient was treated the following medications:  Medications   acetaminophen (TYLENOL) tablet 1,000 mg (1,000 mg Oral Not Given 2/9/23 1230)   aspirin EC tablet 81 mg (has no administration in time range)     Or   aspirin suppository 300 mg (has no administration in time range)   iopamidol (ISOVUE-370) 76 % injection 80 mL (80 mLs IntraVENous Given 2/9/23 1415)     Labs including cbc to evaluate for anemia, leukocytosis or thrombocytopenia, metabolic panel to evaluate for electrolyte abnormality, liver or renal dysfunction are all unremarkable. Coags to evaluate for coagulopathy pro time of 11.2. Covid is negative. EKG and troponin to evaluate for cardiac ischemia. EKG is a sinus rhythm with occasional PVC. There is a ventricular rate of 78 bpm.  No elevation or depression noted. Troponin within normal parameters so my concern for ACS is lower. CONSULTS: IP CONSULT TO NEUROLOGY    Decisions regarding hospitalizations or escalation of care   I have discussed [facility] ED presentation and ED course with Mihai Wallis, Hospitalist from the IM service. Based on that discussion we have decided to admit Kalpana Lanza to their service  for further evaluation and care. I did discuss with the patient the risk of going home is greater and the risk of hospitalization for continued work-up. Although reluctant, the patient is agreeable to hospitalization for continued work-up of CVA related to TIA symptoms. Admission Considered      FINAL IMPRESSION      1. Multinodular goiter    2. Acute nonintractable headache, unspecified headache type    3. Ataxia    4. TIA (transient ischemic attack)    5. Other specified diabetes mellitus with other specified complication, unspecified whether long term insulin use ([facility] [address]          DISPOSITION/PLAN     DISPOSITION Admitted 02/09/2023 04:00:42 PM    PATIENT REFERRED TO:  No follow-up provider specified.     DISCHARGE MEDICATIONS:  New Prescriptions    No medications on file       DISCONTINUED MEDICATIONS:  Discontinued Medications    No medications on file     (Please note that portions of this note were completed with a voice recognition program.  Efforts were made to edit the dictations but occasionally words are mis-transcribed.)    ELLIE Sarmiento CNP (electronically signed)      ELLIE Sarmiento CNP  02/09/23 4625 Yes

## 2023-06-13 ENCOUNTER — NON-APPOINTMENT (OUTPATIENT)
Age: 68
End: 2023-06-13

## 2023-06-13 ENCOUNTER — APPOINTMENT (OUTPATIENT)
Dept: CARDIOLOGY | Facility: CLINIC | Age: 68
End: 2023-06-13
Payer: COMMERCIAL

## 2023-06-13 VITALS
HEART RATE: 80 BPM | HEIGHT: 62 IN | OXYGEN SATURATION: 98 % | BODY MASS INDEX: 34.96 KG/M2 | RESPIRATION RATE: 12 BRPM | DIASTOLIC BLOOD PRESSURE: 78 MMHG | WEIGHT: 190 LBS | SYSTOLIC BLOOD PRESSURE: 145 MMHG

## 2023-06-13 VITALS — DIASTOLIC BLOOD PRESSURE: 64 MMHG | SYSTOLIC BLOOD PRESSURE: 132 MMHG

## 2023-06-13 LAB
25(OH)D3 SERPL-MCNC: 25.1 NG/ML
ALBUMIN SERPL ELPH-MCNC: 4.1 G/DL
ALP BLD-CCNC: 91 U/L
ALT SERPL-CCNC: 15 U/L
ANION GAP SERPL CALC-SCNC: 12 MMOL/L
AST SERPL-CCNC: 15 U/L
BILIRUB SERPL-MCNC: 0.5 MG/DL
BUN SERPL-MCNC: 16 MG/DL
CALCIUM SERPL-MCNC: 9.4 MG/DL
CHLORIDE SERPL-SCNC: 105 MMOL/L
CHOLEST SERPL-MCNC: 194 MG/DL
CO2 SERPL-SCNC: 24 MMOL/L
CREAT SERPL-MCNC: 0.69 MG/DL
EGFR: 95 ML/MIN/1.73M2
ESTIMATED AVERAGE GLUCOSE: 146 MG/DL
FOLATE SERPL-MCNC: 11.6 NG/ML
GLUCOSE SERPL-MCNC: 107 MG/DL
HBA1C MFR BLD HPLC: 6.7 %
HDLC SERPL-MCNC: 47 MG/DL
LDLC SERPL CALC-MCNC: 112 MG/DL
NONHDLC SERPL-MCNC: 147 MG/DL
POTASSIUM SERPL-SCNC: 4.2 MMOL/L
PROT SERPL-MCNC: 7.3 G/DL
SODIUM SERPL-SCNC: 142 MMOL/L
TRIGL SERPL-MCNC: 176 MG/DL
TSH SERPL-ACNC: 1.42 UIU/ML
VIT B12 SERPL-MCNC: 432 PG/ML

## 2023-06-13 PROCEDURE — 99214 OFFICE O/P EST MOD 30 MIN: CPT | Mod: 25

## 2023-06-13 PROCEDURE — 93000 ELECTROCARDIOGRAM COMPLETE: CPT

## 2023-06-28 LAB
25(OH)D3 SERPL-MCNC: 22.7 NG/ML
ALBUMIN SERPL ELPH-MCNC: 4.2 G/DL
ALP BLD-CCNC: 85 U/L
ALT SERPL-CCNC: 13 U/L
ANION GAP SERPL CALC-SCNC: 17 MMOL/L
AST SERPL-CCNC: 18 U/L
BILIRUB SERPL-MCNC: 0.5 MG/DL
BUN SERPL-MCNC: 15 MG/DL
CALCIUM SERPL-MCNC: 9.9 MG/DL
CHLORIDE SERPL-SCNC: 105 MMOL/L
CHOLEST SERPL-MCNC: 179 MG/DL
CO2 SERPL-SCNC: 20 MMOL/L
CREAT SERPL-MCNC: 0.85 MG/DL
EGFR: 75 ML/MIN/1.73M2
ESTIMATED AVERAGE GLUCOSE: 137 MG/DL
FOLATE SERPL-MCNC: 10.7 NG/ML
GLUCOSE SERPL-MCNC: 96 MG/DL
HBA1C MFR BLD HPLC: 6.4 %
HDLC SERPL-MCNC: 44 MG/DL
LDLC SERPL CALC-MCNC: 95 MG/DL
NONHDLC SERPL-MCNC: 135 MG/DL
POTASSIUM SERPL-SCNC: 4.4 MMOL/L
PROT SERPL-MCNC: 7.2 G/DL
SODIUM SERPL-SCNC: 142 MMOL/L
TRIGL SERPL-MCNC: 198 MG/DL
TSH SERPL-ACNC: 1.35 UIU/ML
VIT B12 SERPL-MCNC: 632 PG/ML

## 2023-07-11 ENCOUNTER — INPATIENT (INPATIENT)
Facility: HOSPITAL | Age: 68
LOS: 2 days | Discharge: HOME | End: 2023-07-14
Attending: INTERNAL MEDICINE | Admitting: INTERNAL MEDICINE
Payer: COMMERCIAL

## 2023-07-11 VITALS
TEMPERATURE: 98 F | OXYGEN SATURATION: 99 % | SYSTOLIC BLOOD PRESSURE: 112 MMHG | HEART RATE: 75 BPM | RESPIRATION RATE: 18 BRPM | DIASTOLIC BLOOD PRESSURE: 55 MMHG

## 2023-07-11 DIAGNOSIS — Z98.89 OTHER SPECIFIED POSTPROCEDURAL STATES: Chronic | ICD-10-CM

## 2023-07-11 DIAGNOSIS — R52 PAIN, UNSPECIFIED: ICD-10-CM

## 2023-07-11 DIAGNOSIS — D70.9 NEUTROPENIA, UNSPECIFIED: ICD-10-CM

## 2023-07-11 DIAGNOSIS — Z90.710 ACQUIRED ABSENCE OF BOTH CERVIX AND UTERUS: Chronic | ICD-10-CM

## 2023-07-11 DIAGNOSIS — I10 ESSENTIAL (PRIMARY) HYPERTENSION: ICD-10-CM

## 2023-07-11 DIAGNOSIS — Z95.5 PRESENCE OF CORONARY ANGIOPLASTY IMPLANT AND GRAFT: Chronic | ICD-10-CM

## 2023-07-11 DIAGNOSIS — R07.9 CHEST PAIN, UNSPECIFIED: ICD-10-CM

## 2023-07-11 DIAGNOSIS — C50.919 MALIGNANT NEOPLASM OF UNSPECIFIED SITE OF UNSPECIFIED FEMALE BREAST: ICD-10-CM

## 2023-07-11 LAB
ALBUMIN SERPL ELPH-MCNC: 3.5 G/DL — SIGNIFICANT CHANGE UP (ref 3.3–5)
ALP SERPL-CCNC: 67 U/L — SIGNIFICANT CHANGE UP (ref 40–120)
ALT FLD-CCNC: 21 U/L — SIGNIFICANT CHANGE UP (ref 4–33)
ANION GAP SERPL CALC-SCNC: 13 MMOL/L — SIGNIFICANT CHANGE UP (ref 7–14)
ANISOCYTOSIS BLD QL: SLIGHT — SIGNIFICANT CHANGE UP
APPEARANCE UR: CLEAR — SIGNIFICANT CHANGE UP
AST SERPL-CCNC: 42 U/L — HIGH (ref 4–32)
B PERT DNA SPEC QL NAA+PROBE: SIGNIFICANT CHANGE UP
B PERT+PARAPERT DNA PNL SPEC NAA+PROBE: SIGNIFICANT CHANGE UP
BACTERIA # UR AUTO: ABNORMAL /HPF
BASE EXCESS BLDV CALC-SCNC: 1.2 MMOL/L — SIGNIFICANT CHANGE UP (ref -2–3)
BASOPHILS # BLD AUTO: 0 K/UL — SIGNIFICANT CHANGE UP (ref 0–0.2)
BASOPHILS NFR BLD AUTO: 0 % — SIGNIFICANT CHANGE UP (ref 0–2)
BILIRUB SERPL-MCNC: 0.6 MG/DL — SIGNIFICANT CHANGE UP (ref 0.2–1.2)
BILIRUB UR-MCNC: NEGATIVE — SIGNIFICANT CHANGE UP
BLOOD GAS VENOUS COMPREHENSIVE RESULT: SIGNIFICANT CHANGE UP
BORDETELLA PARAPERTUSSIS (RAPRVP): SIGNIFICANT CHANGE UP
BUN SERPL-MCNC: 15 MG/DL — SIGNIFICANT CHANGE UP (ref 7–23)
C PNEUM DNA SPEC QL NAA+PROBE: SIGNIFICANT CHANGE UP
CALCIUM SERPL-MCNC: 8.8 MG/DL — SIGNIFICANT CHANGE UP (ref 8.4–10.5)
CHLORIDE BLDV-SCNC: 104 MMOL/L — SIGNIFICANT CHANGE UP (ref 96–108)
CHLORIDE SERPL-SCNC: 98 MMOL/L — SIGNIFICANT CHANGE UP (ref 98–107)
CO2 BLDV-SCNC: 28 MMOL/L — HIGH (ref 22–26)
CO2 SERPL-SCNC: 23 MMOL/L — SIGNIFICANT CHANGE UP (ref 22–31)
COLOR SPEC: YELLOW — SIGNIFICANT CHANGE UP
CREAT SERPL-MCNC: 0.55 MG/DL — SIGNIFICANT CHANGE UP (ref 0.5–1.3)
DIFF PNL FLD: NEGATIVE — SIGNIFICANT CHANGE UP
EGFR: 100 ML/MIN/1.73M2 — SIGNIFICANT CHANGE UP
EOSINOPHIL # BLD AUTO: 0.01 K/UL — SIGNIFICANT CHANGE UP (ref 0–0.5)
EOSINOPHIL NFR BLD AUTO: 0.8 % — SIGNIFICANT CHANGE UP (ref 0–6)
EPI CELLS # UR: PRESENT
FLUAV SUBTYP SPEC NAA+PROBE: SIGNIFICANT CHANGE UP
FLUBV RNA SPEC QL NAA+PROBE: SIGNIFICANT CHANGE UP
GAS PNL BLDV: 136 MMOL/L — SIGNIFICANT CHANGE UP (ref 136–145)
GAS PNL BLDV: SIGNIFICANT CHANGE UP
GIANT PLATELETS BLD QL SMEAR: PRESENT — SIGNIFICANT CHANGE UP
GLUCOSE BLDV-MCNC: 122 MG/DL — HIGH (ref 70–99)
GLUCOSE SERPL-MCNC: 129 MG/DL — HIGH (ref 70–99)
GLUCOSE UR QL: NEGATIVE MG/DL — SIGNIFICANT CHANGE UP
HADV DNA SPEC QL NAA+PROBE: SIGNIFICANT CHANGE UP
HCO3 BLDV-SCNC: 27 MMOL/L — SIGNIFICANT CHANGE UP (ref 22–29)
HCOV 229E RNA SPEC QL NAA+PROBE: SIGNIFICANT CHANGE UP
HCOV HKU1 RNA SPEC QL NAA+PROBE: SIGNIFICANT CHANGE UP
HCOV NL63 RNA SPEC QL NAA+PROBE: SIGNIFICANT CHANGE UP
HCOV OC43 RNA SPEC QL NAA+PROBE: SIGNIFICANT CHANGE UP
HCT VFR BLD CALC: 32.9 % — LOW (ref 34.5–45)
HCT VFR BLDA CALC: 29 % — LOW (ref 34.5–46.5)
HGB BLD CALC-MCNC: 9.8 G/DL — LOW (ref 11.7–16.1)
HGB BLD-MCNC: 10.8 G/DL — LOW (ref 11.5–15.5)
HMPV RNA SPEC QL NAA+PROBE: SIGNIFICANT CHANGE UP
HPIV1 RNA SPEC QL NAA+PROBE: SIGNIFICANT CHANGE UP
HPIV2 RNA SPEC QL NAA+PROBE: SIGNIFICANT CHANGE UP
HPIV3 RNA SPEC QL NAA+PROBE: SIGNIFICANT CHANGE UP
HPIV4 RNA SPEC QL NAA+PROBE: SIGNIFICANT CHANGE UP
IANC: 0.38 K/UL — LOW (ref 1.8–7.4)
KETONES UR-MCNC: NEGATIVE MG/DL — SIGNIFICANT CHANGE UP
LACTATE BLDV-MCNC: 2 MMOL/L — SIGNIFICANT CHANGE UP (ref 0.5–2)
LEUKOCYTE ESTERASE UR-ACNC: NEGATIVE — SIGNIFICANT CHANGE UP
LYMPHOCYTES # BLD AUTO: 0.86 K/UL — LOW (ref 1–3.3)
LYMPHOCYTES # BLD AUTO: 55.6 % — HIGH (ref 13–44)
M PNEUMO DNA SPEC QL NAA+PROBE: SIGNIFICANT CHANGE UP
MACROCYTES BLD QL: SLIGHT — SIGNIFICANT CHANGE UP
MCHC RBC-ENTMCNC: 29.9 PG — SIGNIFICANT CHANGE UP (ref 27–34)
MCHC RBC-ENTMCNC: 32.8 GM/DL — SIGNIFICANT CHANGE UP (ref 32–36)
MCV RBC AUTO: 91.1 FL — SIGNIFICANT CHANGE UP (ref 80–100)
MONOCYTES # BLD AUTO: 0.26 K/UL — SIGNIFICANT CHANGE UP (ref 0–0.9)
MONOCYTES NFR BLD AUTO: 17.1 % — HIGH (ref 2–14)
MYELOCYTES NFR BLD: 0.9 % — HIGH (ref 0–0)
NEUTROPHILS # BLD AUTO: 0.29 K/UL — LOW (ref 1.8–7.4)
NEUTROPHILS NFR BLD AUTO: 15.4 % — LOW (ref 43–77)
NEUTS BAND # BLD: 3.4 % — SIGNIFICANT CHANGE UP (ref 0–6)
NITRITE UR-MCNC: NEGATIVE — SIGNIFICANT CHANGE UP
PCO2 BLDV: 45 MMHG — SIGNIFICANT CHANGE UP (ref 39–52)
PH BLDV: 7.38 — SIGNIFICANT CHANGE UP (ref 7.32–7.43)
PH UR: 6 — SIGNIFICANT CHANGE UP (ref 5–8)
PLAT MORPH BLD: NORMAL — SIGNIFICANT CHANGE UP
PLATELET # BLD AUTO: 123 K/UL — LOW (ref 150–400)
PLATELET COUNT - ESTIMATE: ABNORMAL
PO2 BLDV: 25 MMHG — SIGNIFICANT CHANGE UP (ref 25–45)
POTASSIUM BLDV-SCNC: 4.3 MMOL/L — SIGNIFICANT CHANGE UP (ref 3.5–5.1)
POTASSIUM SERPL-MCNC: 5.3 MMOL/L — SIGNIFICANT CHANGE UP (ref 3.5–5.3)
POTASSIUM SERPL-SCNC: 5.3 MMOL/L — SIGNIFICANT CHANGE UP (ref 3.5–5.3)
PROT SERPL-MCNC: 6.8 G/DL — SIGNIFICANT CHANGE UP (ref 6–8.3)
PROT UR-MCNC: SIGNIFICANT CHANGE UP MG/DL
RAPID RVP RESULT: SIGNIFICANT CHANGE UP
RBC # BLD: 3.61 M/UL — LOW (ref 3.8–5.2)
RBC # FLD: 12.2 % — SIGNIFICANT CHANGE UP (ref 10.3–14.5)
RBC BLD AUTO: NORMAL — SIGNIFICANT CHANGE UP
RBC CASTS # UR COMP ASSIST: 5 /HPF — HIGH (ref 0–4)
RSV RNA SPEC QL NAA+PROBE: SIGNIFICANT CHANGE UP
RV+EV RNA SPEC QL NAA+PROBE: SIGNIFICANT CHANGE UP
SAO2 % BLDV: 32.6 % — LOW (ref 67–88)
SARS-COV-2 RNA SPEC QL NAA+PROBE: SIGNIFICANT CHANGE UP
SODIUM SERPL-SCNC: 134 MMOL/L — LOW (ref 135–145)
SP GR SPEC: 1.08 — HIGH (ref 1–1.03)
TROPONIN T, HIGH SENSITIVITY RESULT: 8 NG/L — SIGNIFICANT CHANGE UP
TROPONIN T, HIGH SENSITIVITY RESULT: 8 NG/L — SIGNIFICANT CHANGE UP
UROBILINOGEN FLD QL: 0.2 MG/DL — SIGNIFICANT CHANGE UP (ref 0.2–1)
VARIANT LYMPHS # BLD: 6.8 % — HIGH (ref 0–6)
WBC # BLD: 1.54 K/UL — LOW (ref 3.8–10.5)
WBC # FLD AUTO: 1.54 K/UL — LOW (ref 3.8–10.5)
WBC UR QL: 5 /HPF — SIGNIFICANT CHANGE UP (ref 0–5)

## 2023-07-11 PROCEDURE — 74177 CT ABD & PELVIS W/CONTRAST: CPT | Mod: 26,MA

## 2023-07-11 PROCEDURE — 99285 EMERGENCY DEPT VISIT HI MDM: CPT

## 2023-07-11 PROCEDURE — 99223 1ST HOSP IP/OBS HIGH 75: CPT | Mod: GC

## 2023-07-11 PROCEDURE — 71046 X-RAY EXAM CHEST 2 VIEWS: CPT | Mod: 26

## 2023-07-11 RX ORDER — NYSTATIN 500MM UNIT
500000 POWDER (EA) MISCELLANEOUS
Refills: 0 | Status: DISCONTINUED | OUTPATIENT
Start: 2023-07-11 | End: 2023-07-11

## 2023-07-11 RX ORDER — CLOPIDOGREL BISULFATE 75 MG/1
75 TABLET, FILM COATED ORAL DAILY
Refills: 0 | Status: DISCONTINUED | OUTPATIENT
Start: 2023-07-11 | End: 2023-07-14

## 2023-07-11 RX ORDER — KETOROLAC TROMETHAMINE 30 MG/ML
15 SYRINGE (ML) INJECTION ONCE
Refills: 0 | Status: DISCONTINUED | OUTPATIENT
Start: 2023-07-11 | End: 2023-07-11

## 2023-07-11 RX ORDER — DIAZEPAM 5 MG
5 TABLET ORAL ONCE
Refills: 0 | Status: DISCONTINUED | OUTPATIENT
Start: 2023-07-11 | End: 2023-07-11

## 2023-07-11 RX ORDER — AMLODIPINE BESYLATE 2.5 MG/1
10 TABLET ORAL DAILY
Refills: 0 | Status: DISCONTINUED | OUTPATIENT
Start: 2023-07-11 | End: 2023-07-14

## 2023-07-11 RX ORDER — ASPIRIN/CALCIUM CARB/MAGNESIUM 324 MG
81 TABLET ORAL DAILY
Refills: 0 | Status: DISCONTINUED | OUTPATIENT
Start: 2023-07-11 | End: 2023-07-14

## 2023-07-11 RX ORDER — METOPROLOL TARTRATE 50 MG
25 TABLET ORAL DAILY
Refills: 0 | Status: DISCONTINUED | OUTPATIENT
Start: 2023-07-11 | End: 2023-07-14

## 2023-07-11 RX ORDER — SPIRONOLACTONE 25 MG/1
25 TABLET, FILM COATED ORAL DAILY
Refills: 0 | Status: DISCONTINUED | OUTPATIENT
Start: 2023-07-11 | End: 2023-07-11

## 2023-07-11 RX ORDER — ACETAMINOPHEN 500 MG
1000 TABLET ORAL ONCE
Refills: 0 | Status: COMPLETED | OUTPATIENT
Start: 2023-07-11 | End: 2023-07-11

## 2023-07-11 RX ORDER — CEFEPIME 1 G/1
2000 INJECTION, POWDER, FOR SOLUTION INTRAMUSCULAR; INTRAVENOUS ONCE
Refills: 0 | Status: COMPLETED | OUTPATIENT
Start: 2023-07-11 | End: 2023-07-11

## 2023-07-11 RX ORDER — NYSTATIN 500MM UNIT
500000 POWDER (EA) MISCELLANEOUS
Refills: 0 | Status: DISCONTINUED | OUTPATIENT
Start: 2023-07-11 | End: 2023-07-14

## 2023-07-11 RX ORDER — METOPROLOL TARTRATE 50 MG
25 TABLET ORAL DAILY
Refills: 0 | Status: DISCONTINUED | OUTPATIENT
Start: 2023-07-11 | End: 2023-07-11

## 2023-07-11 RX ORDER — CEFEPIME 1 G/1
2000 INJECTION, POWDER, FOR SOLUTION INTRAMUSCULAR; INTRAVENOUS EVERY 8 HOURS
Refills: 0 | Status: DISCONTINUED | OUTPATIENT
Start: 2023-07-11 | End: 2023-07-14

## 2023-07-11 RX ORDER — AMLODIPINE BESYLATE 2.5 MG/1
10 TABLET ORAL DAILY
Refills: 0 | Status: DISCONTINUED | OUTPATIENT
Start: 2023-07-11 | End: 2023-07-11

## 2023-07-11 RX ORDER — SPIRONOLACTONE 25 MG/1
25 TABLET, FILM COATED ORAL DAILY
Refills: 0 | Status: DISCONTINUED | OUTPATIENT
Start: 2023-07-11 | End: 2023-07-14

## 2023-07-11 RX ORDER — ATORVASTATIN CALCIUM 80 MG/1
80 TABLET, FILM COATED ORAL AT BEDTIME
Refills: 0 | Status: DISCONTINUED | OUTPATIENT
Start: 2023-07-11 | End: 2023-07-14

## 2023-07-11 RX ORDER — NYSTATIN 500MM UNIT
500000 POWDER (EA) MISCELLANEOUS EVERY 12 HOURS
Refills: 0 | Status: DISCONTINUED | OUTPATIENT
Start: 2023-07-11 | End: 2023-07-11

## 2023-07-11 RX ADMIN — CEFEPIME 100 MILLIGRAM(S): 1 INJECTION, POWDER, FOR SOLUTION INTRAMUSCULAR; INTRAVENOUS at 19:13

## 2023-07-11 RX ADMIN — Medication 500000 UNIT(S): at 19:19

## 2023-07-11 RX ADMIN — Medication 15 MILLIGRAM(S): at 10:46

## 2023-07-11 RX ADMIN — ATORVASTATIN CALCIUM 80 MILLIGRAM(S): 80 TABLET, FILM COATED ORAL at 23:16

## 2023-07-11 RX ADMIN — Medication 400 MILLIGRAM(S): at 09:26

## 2023-07-11 RX ADMIN — Medication 5 MILLIGRAM(S): at 10:46

## 2023-07-11 RX ADMIN — CEFEPIME 100 MILLIGRAM(S): 1 INJECTION, POWDER, FOR SOLUTION INTRAMUSCULAR; INTRAVENOUS at 12:25

## 2023-07-11 RX ADMIN — Medication 1000 MILLIGRAM(S): at 09:56

## 2023-07-11 NOTE — H&P ADULT - NSHPREVIEWOFSYSTEMS_GEN_ALL_CORE
REVIEW OF SYSTEMS:  CONSTITUTIONAL: Fever in the ED  EYES/ENT: No visual changes;  No vertigo or throat pain   NECK: No stiffness  RESPIRATORY: No cough, wheezing, hemoptysis; No shortness of breath  CARDIOVASCULAR: Chest pain due to rib pain  GASTROINTESTINAL: No abdominal or epigastric pain. No nausea, vomiting, or hematemesis; No diarrhea or constipation. No melena or hematochezia.  GENITOURINARY: No dysuria, frequency or hematuria  NEUROLOGICAL: No numbness or weakness  SKIN: No itching, rashes

## 2023-07-11 NOTE — H&P ADULT - NSHPLABSRESULTS_GEN_ALL_CORE
CBC Full  -  ( 11 Jul 2023 10:05 )  WBC Count : 1.54 K/uL  RBC Count : 3.61 M/uL  Hemoglobin : 10.8 g/dL  Hematocrit : 32.9 %  Platelet Count - Automated : 123 K/uL  Mean Cell Volume : 91.1 fL  Mean Cell Hemoglobin : 29.9 pg  Mean Cell Hemoglobin Concentration : 32.8 gm/dL  Auto Neutrophil # : 0.29 K/uL  Auto Lymphocyte # : 0.86 K/uL  Auto Monocyte # : 0.26 K/uL  Auto Eosinophil # : 0.01 K/uL  Auto Basophil # : 0.00 K/uL  Auto Neutrophil % : 15.4 %  Auto Lymphocyte % : 55.6 %  Auto Monocyte % : 17.1 %  Auto Eosinophil % : 0.8 %  Auto Basophil % : 0.0 %      07-11    134<L>  |  98  |  15  ----------------------------<  129<H>  5.3   |  23  |  0.55    Ca    8.8      11 Jul 2023 10:05    TPro  6.8  /  Alb  3.5  /  TBili  0.6  /  DBili  x   /  AST  42<H>  /  ALT  21  /  AlkPhos  67  07-11

## 2023-07-11 NOTE — ED PROVIDER NOTE - CLINICAL SUMMARY MEDICAL DECISION MAKING FREE TEXT BOX
68F PMH CAD s/p stents, HTN, breast ca s/p lumpectomy 5/2023 on chemo most recent 7/5 p/w worsening body aches. Rectal temp 100.2F. Concern for sepsis vs neutropenic fever. Plan: infectious and cardiac w/u - blood work, CXR, ECG, UA/UC, blood cultures, tylenol. Will re-assess.

## 2023-07-11 NOTE — H&P ADULT - NSHPPHYSICALEXAM_GEN_ALL_CORE
CONSTITUTIONAL: Significant distress and pain  EYES: PERRLA and symmetric, EOMI, No conjunctival or scleral injection, non-icteric  ENMT: Oral mucosa with moist membranes. Normal dentition; no pharyngeal injection or exudates             NECK: Supple, symmetric and without tracheal deviation   RESP: No respiratory distress, no use of accessory muscles; CTA b/l, no WRR  CV: RRR, +S1S2, no MRG; no JVD; no peripheral edema  GI: Soft, NT, ND, no rebound, no guarding; no palpable masses; no hepatosplenomegaly; no hernia palpated  MSK: Normal gait; No digital clubbing or cyanosis; examination of the (head/neck/spine/ribs/pelvis, RUE, LUE, RLE, LLE) without misalignment,            Normal ROM without pain, no spinal tenderness, normal muscle strength/tone  SKIN: No rashes or ulcers noted; no subcutaneous nodules or induration palpable

## 2023-07-11 NOTE — H&P ADULT - ASSESSMENT
68F PMH CAD s/p stents, HTN, breast ca s/p lumpectomy 5/2023 on chemo most recent 7/5 p/w worsening body aches with possible neutropenic fever given decreased WBC counts.

## 2023-07-11 NOTE — PATIENT PROFILE ADULT - FALL HARM RISK - HARM RISK INTERVENTIONS

## 2023-07-11 NOTE — H&P ADULT - HIV OFFER
Advance Care Planning   Healthcare Decision Maker:    Primary Decision Maker: Sagrario Thomson Child - [phone]    Secondary Decision Maker: Oneal Nguyen Child - [phone]    Outcome:  Patient has a Living Will that indicates his Alternate Surrogates. Plan:  to ask the patient re: wife as main surrogate.       Electronically signed by Ophelia Rich MA [facility] on 8/26/2022 at 1:00 PM
Opt out

## 2023-07-11 NOTE — ED ADULT NURSE NOTE - NSFALLUNIVINTERV_ED_ALL_ED
Bed/Stretcher in lowest position, wheels locked, appropriate side rails in place/Call bell, personal items and telephone in reach/Instruct patient to call for assistance before getting out of bed/chair/stretcher/Non-slip footwear applied when patient is off stretcher/Jane Lew to call system/Physically safe environment - no spills, clutter or unnecessary equipment/Purposeful proactive rounding/Room/bathroom lighting operational, light cord in reach

## 2023-07-11 NOTE — ED PROVIDER NOTE - PROGRESS NOTE DETAILS
Jemal Gil, PGY3 Patient states her pain has resolved with pain medications.  Patient found to be neutropenic and temp of 100.2.  Patient is antibiosis, blood cultures were drawn and will admit for neutropenic fever and pain control

## 2023-07-11 NOTE — ED ADULT TRIAGE NOTE - CHIEF COMPLAINT QUOTE
Pt c/o chest pain, SOB, body aches since this morning. Pt with hx of cardiac stents, recent lumpectomy and chemo on Thursday. Per EMS, temp 100 on scene, took 1gr of Tylenol.

## 2023-07-11 NOTE — H&P ADULT - PROBLEM SELECTOR PLAN 1
- IANC of 0.38, fever in ED of 100.6  - Generalized pain  - Likely infection given neutropenia  - no ports, rashes  - Cefepime in case adding Vanc to regimen

## 2023-07-11 NOTE — ED PROVIDER NOTE - ATTENDING CONTRIBUTION TO CARE
68-year-old female past medical history of CAD status post stents, hypertension, breast cancer status postlumpectomy in May 2023, recently started on chemotherapy on 7 5, followed by Neulasta on Thursday, presenting with complaints of joint pain, back pain, chest pain, abdominal pain that started on Saturday no associated nausea, vomiting, shortness of breath.  She discussed with her oncology team and was recommended to increase her dose of Tylenol.  Patient states that Tylenol is not improving symptoms.  Denies any falls or recent trauma patient noted to have temperature of 100.2 today.  On exam, patient is in distress secondary to pain, lower abdominal tenderness to palpation on exam, no midline cervical or spinal tenderness to palpation or step-offs or masses.  Plan for labs, meds, fluids, CT abdomen pelvis, reassess

## 2023-07-11 NOTE — H&P ADULT - PROBLEM SELECTOR PLAN 2
- Pain generalized, mostly to upper body  - allergic/adverse reaction to morphine and concern with bradycardia for dilaudid   - trial percocet 5, follow closely for allergic reaction

## 2023-07-11 NOTE — H&P ADULT - HISTORY OF PRESENT ILLNESS
68F PMH CAD s/p stents, HTN, breast ca s/p lumpectomy 5/2023 on chemo most recent 7/5 p/w worsening body aches. Daughter at beside for collateral. Pt says that the body aches started last Thursday/Friday and have been worsening. At times they are more severe and associated with chest pain and SOB. Currently pt denies any CP or SOB. Has been afebrile at home. Has a cough at baseline 2/2 allergies. Denies abdominal pain, N/V, urinary symptoms, constipation, diarrhea, LE pain or edema. Oncologist at [facility] is Dr. Tejeda

## 2023-07-11 NOTE — ED ADULT NURSE NOTE - OBJECTIVE STATEMENT
pt received into spot 10. Pt is AxO 3. Pt complaining of left sided chest pain radiating through her whole body. P gary recently diagnosed with stage 1 breast cancer in April. Pt received a left side lumpectomy on Wednesday, and had he first round of chemo on Thursday. pt daughter states pt tolerated the chemo session well. pt was given 1000mg of Tylenol at home at 02:00 as instructed by her provider at [facility] due to fevers at home. Pts [facility] provider instructed her to come to the hospital to rule out possible infection complication due to the recent lumpectomy. pt was febrile rectally. respirations are even, and unlabored. Pt is normal sinus on the tele monitor. Abdomen is soft, nontender, and nondistended. Pulses equal bilaterally. will continue to monitor, and maintain safety.

## 2023-07-11 NOTE — ED PROVIDER NOTE - OBJECTIVE STATEMENT
68F PMH CAD s/p stents, HTN, breast ca s/p lumpectomy 5/2023 on chemo most recent 7/5 p/w worsening body aches. Daughter at beside for collateral. Pt says that the body aches started last Thursday/Friday and have been worsening. At times they are more severe and associated with chest pain and SOB. Currently pt denies any CP or SOB. Has been afebrile at home. Has a cough at baseline 2/2 allergies. Denies abdominal pain, N/V, urinary symptoms, constipation, diarrhea, LE pain or edema. Oncologist at [facility] is Dr. Tejeda.

## 2023-07-11 NOTE — ED PROVIDER NOTE - PHYSICAL EXAMINATION
PHYSICAL EXAM:  GENERAL: Uncomfortable 2/2 pain  HENMT: Atraumatic, moist mucous membranes, oral candidiasis EYES: Clear bilaterally, PERRL, EOMs intact b/l  HEART: Regular rate and regular rhythm, S1/S2, no murmur  RESPIRATORY: Clear to auscultation bilaterally, no wheezes/rhonchi/rales  ABDOMEN: Soft, mild non-localizable ttp, nondistended  EXTREMITIES: B/l upper and lower extremities with no ttp, no lower extremity edema  NEURO: A&Ox4  SKIN: Skin normal color for race, warm, dry and intact

## 2023-07-11 NOTE — H&P ADULT - ATTENDING COMMENTS
68W PMH breast cancer on chemotherapy (last cycle on July 5) presents with neutropenic fever. ANC <500. Otherwise hemodynamically stable. Reports diffuse body aches but no localizing infectious signs. Does not have a port. No rashes or erythema. Will continue with cefepime. Oncology consulted, await recommendations. Awaiting culture data.    I examined this patient and discussed their management with house staff on July 11, 2023. 68W PMH breast cancer on chemotherapy (last cycle on July 5) presents with neutropenic fever. ANC <500. Otherwise hemodynamically stable. Reports diffuse body aches but no localizing infectious signs. Does not have a port. No rashes or erythema. Will continue with cefepime. Oncology consulted, await recommendations. Awaiting culture data.    Daughter at bedside. All questions answered.    I examined this patient and discussed their management with house staff on July 11, 2023.

## 2023-07-12 LAB
ALBUMIN SERPL ELPH-MCNC: 3.5 G/DL — SIGNIFICANT CHANGE UP (ref 3.3–5)
ALP SERPL-CCNC: 72 U/L — SIGNIFICANT CHANGE UP (ref 40–120)
ALT FLD-CCNC: 14 U/L — SIGNIFICANT CHANGE UP (ref 4–33)
ANION GAP SERPL CALC-SCNC: 9 MMOL/L — SIGNIFICANT CHANGE UP (ref 7–14)
AST SERPL-CCNC: 16 U/L — SIGNIFICANT CHANGE UP (ref 4–32)
BASOPHILS # BLD AUTO: 0.02 K/UL — SIGNIFICANT CHANGE UP (ref 0–0.2)
BASOPHILS NFR BLD AUTO: 0.3 % — SIGNIFICANT CHANGE UP (ref 0–2)
BILIRUB SERPL-MCNC: 0.7 MG/DL — SIGNIFICANT CHANGE UP (ref 0.2–1.2)
BUN SERPL-MCNC: 15 MG/DL — SIGNIFICANT CHANGE UP (ref 7–23)
CALCIUM SERPL-MCNC: 8.9 MG/DL — SIGNIFICANT CHANGE UP (ref 8.4–10.5)
CHLORIDE SERPL-SCNC: 103 MMOL/L — SIGNIFICANT CHANGE UP (ref 98–107)
CO2 SERPL-SCNC: 25 MMOL/L — SIGNIFICANT CHANGE UP (ref 22–31)
CREAT SERPL-MCNC: 0.69 MG/DL — SIGNIFICANT CHANGE UP (ref 0.5–1.3)
CULTURE RESULTS: NO GROWTH — SIGNIFICANT CHANGE UP
EGFR: 94 ML/MIN/1.73M2 — SIGNIFICANT CHANGE UP
EOSINOPHIL # BLD AUTO: 0.04 K/UL — SIGNIFICANT CHANGE UP (ref 0–0.5)
EOSINOPHIL NFR BLD AUTO: 0.5 % — SIGNIFICANT CHANGE UP (ref 0–6)
GLUCOSE SERPL-MCNC: 127 MG/DL — HIGH (ref 70–99)
HCT VFR BLD CALC: 31.1 % — LOW (ref 34.5–45)
HCV AB S/CO SERPL IA: 0.31 S/CO — SIGNIFICANT CHANGE UP (ref 0–0.99)
HCV AB SERPL-IMP: SIGNIFICANT CHANGE UP
HGB BLD-MCNC: 10.2 G/DL — LOW (ref 11.5–15.5)
IANC: 4.32 K/UL — SIGNIFICANT CHANGE UP (ref 1.8–7.4)
IMM GRANULOCYTES NFR BLD AUTO: 12.6 % — HIGH (ref 0–0.9)
LYMPHOCYTES # BLD AUTO: 1.23 K/UL — SIGNIFICANT CHANGE UP (ref 1–3.3)
LYMPHOCYTES # BLD AUTO: 16.3 % — SIGNIFICANT CHANGE UP (ref 13–44)
MAGNESIUM SERPL-MCNC: 2 MG/DL — SIGNIFICANT CHANGE UP (ref 1.6–2.6)
MCHC RBC-ENTMCNC: 29.5 PG — SIGNIFICANT CHANGE UP (ref 27–34)
MCHC RBC-ENTMCNC: 32.8 GM/DL — SIGNIFICANT CHANGE UP (ref 32–36)
MCV RBC AUTO: 89.9 FL — SIGNIFICANT CHANGE UP (ref 80–100)
MONOCYTES # BLD AUTO: 0.97 K/UL — HIGH (ref 0–0.9)
MONOCYTES NFR BLD AUTO: 12.9 % — SIGNIFICANT CHANGE UP (ref 2–14)
MRSA PCR RESULT.: SIGNIFICANT CHANGE UP
NEUTROPHILS # BLD AUTO: 4.32 K/UL — SIGNIFICANT CHANGE UP (ref 1.8–7.4)
NEUTROPHILS NFR BLD AUTO: 57.4 % — SIGNIFICANT CHANGE UP (ref 43–77)
NRBC # BLD: 0 /100 WBCS — SIGNIFICANT CHANGE UP (ref 0–0)
NRBC # FLD: 0.03 K/UL — HIGH (ref 0–0)
PHOSPHATE SERPL-MCNC: 3.1 MG/DL — SIGNIFICANT CHANGE UP (ref 2.5–4.5)
PLATELET # BLD AUTO: 139 K/UL — LOW (ref 150–400)
POTASSIUM SERPL-MCNC: 3.9 MMOL/L — SIGNIFICANT CHANGE UP (ref 3.5–5.3)
POTASSIUM SERPL-SCNC: 3.9 MMOL/L — SIGNIFICANT CHANGE UP (ref 3.5–5.3)
PROT SERPL-MCNC: 6.4 G/DL — SIGNIFICANT CHANGE UP (ref 6–8.3)
RBC # BLD: 3.46 M/UL — LOW (ref 3.8–5.2)
RBC # FLD: 12.7 % — SIGNIFICANT CHANGE UP (ref 10.3–14.5)
S AUREUS DNA NOSE QL NAA+PROBE: SIGNIFICANT CHANGE UP
SODIUM SERPL-SCNC: 137 MMOL/L — SIGNIFICANT CHANGE UP (ref 135–145)
SPECIMEN SOURCE: SIGNIFICANT CHANGE UP
WBC # BLD: 7.53 K/UL — SIGNIFICANT CHANGE UP (ref 3.8–10.5)
WBC # FLD AUTO: 7.53 K/UL — SIGNIFICANT CHANGE UP (ref 3.8–10.5)

## 2023-07-12 PROCEDURE — 99232 SBSQ HOSP IP/OBS MODERATE 35: CPT | Mod: GC

## 2023-07-12 RX ORDER — ENOXAPARIN SODIUM 100 MG/ML
40 INJECTION SUBCUTANEOUS EVERY 24 HOURS
Refills: 0 | Status: DISCONTINUED | OUTPATIENT
Start: 2023-07-12 | End: 2023-07-13

## 2023-07-12 RX ORDER — LORATADINE 10 MG/1
10 TABLET ORAL DAILY
Refills: 0 | Status: DISCONTINUED | OUTPATIENT
Start: 2023-07-12 | End: 2023-07-14

## 2023-07-12 RX ADMIN — AMLODIPINE BESYLATE 10 MILLIGRAM(S): 2.5 TABLET ORAL at 06:16

## 2023-07-12 RX ADMIN — Medication 20 MILLIGRAM(S): at 06:16

## 2023-07-12 RX ADMIN — Medication 500000 UNIT(S): at 00:54

## 2023-07-12 RX ADMIN — CEFEPIME 100 MILLIGRAM(S): 1 INJECTION, POWDER, FOR SOLUTION INTRAMUSCULAR; INTRAVENOUS at 04:27

## 2023-07-12 RX ADMIN — ENOXAPARIN SODIUM 40 MILLIGRAM(S): 100 INJECTION SUBCUTANEOUS at 17:22

## 2023-07-12 RX ADMIN — SPIRONOLACTONE 25 MILLIGRAM(S): 25 TABLET, FILM COATED ORAL at 06:16

## 2023-07-12 RX ADMIN — Medication 500000 UNIT(S): at 17:22

## 2023-07-12 RX ADMIN — LORATADINE 10 MILLIGRAM(S): 10 TABLET ORAL at 15:03

## 2023-07-12 RX ADMIN — CEFEPIME 100 MILLIGRAM(S): 1 INJECTION, POWDER, FOR SOLUTION INTRAMUSCULAR; INTRAVENOUS at 19:41

## 2023-07-12 RX ADMIN — Medication 500000 UNIT(S): at 06:17

## 2023-07-12 RX ADMIN — Medication 25 MILLIGRAM(S): at 06:16

## 2023-07-12 RX ADMIN — ATORVASTATIN CALCIUM 80 MILLIGRAM(S): 80 TABLET, FILM COATED ORAL at 22:26

## 2023-07-12 RX ADMIN — CLOPIDOGREL BISULFATE 75 MILLIGRAM(S): 75 TABLET, FILM COATED ORAL at 11:56

## 2023-07-12 RX ADMIN — CEFEPIME 100 MILLIGRAM(S): 1 INJECTION, POWDER, FOR SOLUTION INTRAMUSCULAR; INTRAVENOUS at 11:57

## 2023-07-12 RX ADMIN — Medication 81 MILLIGRAM(S): at 11:56

## 2023-07-12 RX ADMIN — Medication 500000 UNIT(S): at 11:56

## 2023-07-12 NOTE — CONSULT NOTE ADULT - ASSESSMENT
This is a 68 year old female with left breast cancer who presents with generalized body aches and neutropenic fever.    1. Left breast cancer  -- s/p lumpectomy 05/23/2023   -- Began adjuvant treatment with docetaxel/cyclophosphamide, C1 07/05 +Neulasta   -- No systemic treatment while admitted   -- Follow up with Dr. Hernandez at [facility] after discharge     2. Neutropenic fever, bone pains   -- Suspect body aches/bony pains due to Neulasta.   -- Neutropenia improved as Neulasta received 07/05, continue to monitor CBC with differential daily   -- Recommend Claritin prn for bone pain related to Neulasta   -- Pt afebrile now, RVP negative, cultures NGTD. Currently on empiric cefepime.     3. Thrush -- cont Nystatin     4. L neck pain   -- If no resolution with supportive care and antibiotics, would recommend dedicated neck imaging     Will continue to follow.    Cheri Gutierrez PA-C  Hematology/Oncology  [facility]  [phone] (office)  [phone] (alt office)  Evenings and weekends please call MD on call or office

## 2023-07-12 NOTE — PROGRESS NOTE ADULT - ATTENDING COMMENTS
68W PMH breast cancer on chemotherapy (last cycle on July 5) presents with neutropenic fever. ANC <500 on admission but now normalized. Bodyaches have resolved. Continuing with cefepime. Oncology consulted, awaiting recommendations. Awaiting culture data.    Daughter at bedside. All questions answered. 68W PMH breast cancer on chemotherapy (last cycle on July 5) presents with neutropenic fever. ANC <500 on admission but now normalized. Bodyaches have resolved. Continuing with cefepime. Oncology consulted, awaiting recommendations. Awaiting culture data.    Daughter at bedside. All questions answered.    I examined this patient and discussed their management with house staff on July 12, 2023.

## 2023-07-12 NOTE — PROGRESS NOTE ADULT - SUBJECTIVE AND OBJECTIVE BOX
Internal Medicine   Daniel Chacon| PGY-1    OVERNIGHT EVENTS:      SUBJECTIVE:       MEDICATIONS  (STANDING):  amLODIPine   Tablet 10 milliGRAM(s) Oral daily  aspirin enteric coated 81 milliGRAM(s) Oral daily  atorvastatin 80 milliGRAM(s) Oral at bedtime  cefepime   IVPB 2000 milliGRAM(s) IV Intermittent every 8 hours  clopidogrel Tablet 75 milliGRAM(s) Oral daily  enalapril 20 milliGRAM(s) Oral daily  metoprolol succinate ER 25 milliGRAM(s) Oral daily  nystatin    Suspension 335589 Unit(s) Oral four times a day  spironolactone 25 milliGRAM(s) Oral daily    MEDICATIONS  (PRN):  oxycodone    5 mG/acetaminophen 325 mG 1 Tablet(s) Oral every 6 hours PRN Severe Pain (7 - 10)        T(F): 98.3 (07-12-23 @ 05:05), Max: 100.2 (07-11-23 @ 08:47)  HR: 71 (07-12-23 @ 06:12) (61 - 81)  BP: 118/52 (07-12-23 @ 06:12) (108/61 - 136/59)  BP(mean): --  RR: 18 (07-12-23 @ 05:05) (17 - 18)  SpO2: 97% (07-12-23 @ 05:05) (97% - 100%)    PHYSICAL EXAM:     GENERAL: NAD, lying in bed comfortably  HEAD:  Atraumatic, Normocephalic  EYES: EOMI, PERRLA, conjunctiva and sclera clear, no nystagmus noted  ENT: Moist mucous membranes,   NECK: Supple, No JVD, trachea midline  CHEST/LUNG: Clear to auscultation bilaterally; No rales, rhonchi, wheezing, or rubs. Unlabored respirations  HEART: Regular rate and rhythm; No murmurs, rubs, or gallops, normal S1/S2  ABDOMEN: normal bowel sounds; Soft, nontender, nondistended, no organomegaly   EXTREMITIES:  2+ Peripheral Pulses, brisk capillary refill. No clubbing, cyanosis, or edema  MSK: No gross deformities noted   Neurological:  A&Ox3, no focal deficits   SKIN: No rashes or lesions  PSYCH: Normal mood, affect     TELEMETRY:    LABS:                        10.2   x     )-----------( 139      ( 12 Jul 2023 06:40 )             31.1     07-11    134<L>  |  98  |  15  ----------------------------<  129<H>  5.3   |  23  |  0.55    Ca    8.8      11 Jul 2023 10:05    TPro  6.8  /  Alb  3.5  /  TBili  0.6  /  DBili  x   /  AST  42<H>  /  ALT  21  /  AlkPhos  67  07-11            Creatinine Trend: 0.55<--  I&O's Summary    BNP    RADIOLOGY & ADDITIONAL STUDIES:             Internal Medicine   Daniel Oswaldo| PGY-1    OVERNIGHT EVENTS:  Daughter at bedside and provided translation. Free  services offered.      SUBJECTIVE:       MEDICATIONS  (STANDING):  amLODIPine   Tablet 10 milliGRAM(s) Oral daily  aspirin enteric coated 81 milliGRAM(s) Oral daily  atorvastatin 80 milliGRAM(s) Oral at bedtime  cefepime   IVPB 2000 milliGRAM(s) IV Intermittent every 8 hours  clopidogrel Tablet 75 milliGRAM(s) Oral daily  enalapril 20 milliGRAM(s) Oral daily  metoprolol succinate ER 25 milliGRAM(s) Oral daily  nystatin    Suspension 045298 Unit(s) Oral four times a day  spironolactone 25 milliGRAM(s) Oral daily    MEDICATIONS  (PRN):  oxycodone    5 mG/acetaminophen 325 mG 1 Tablet(s) Oral every 6 hours PRN Severe Pain (7 - 10)        T(F): 98.3 (07-12-23 @ 05:05), Max: 100.2 (07-11-23 @ 08:47)  HR: 71 (07-12-23 @ 06:12) (61 - 81)  BP: 118/52 (07-12-23 @ 06:12) (108/61 - 136/59)  BP(mean): --  RR: 18 (07-12-23 @ 05:05) (17 - 18)  SpO2: 97% (07-12-23 @ 05:05) (97% - 100%)    PHYSICAL EXAM:     GENERAL: NAD, lying in bed comfortably  HEAD:  Atraumatic, Normocephalic  EYES: EOMI, PERRLA, conjunctiva and sclera clear, no nystagmus noted  ENT: Moist mucous membranes,   NECK: Supple, No JVD, trachea midline  CHEST/LUNG: Clear to auscultation bilaterally; No rales, rhonchi, wheezing, or rubs. Unlabored respirations  HEART: Regular rate and rhythm; No murmurs, rubs, or gallops, normal S1/S2  ABDOMEN: normal bowel sounds; Soft, nontender, nondistended, no organomegaly   EXTREMITIES:  2+ Peripheral Pulses, brisk capillary refill. No clubbing, cyanosis, or edema  MSK: No gross deformities noted   Neurological:  A&Ox3, no focal deficits   SKIN: No rashes or lesions  PSYCH: Normal mood, affect     TELEMETRY:    LABS:                        10.2   x     )-----------( 139      ( 12 Jul 2023 06:40 )             31.1     07-11    134<L>  |  98  |  15  ----------------------------<  129<H>  5.3   |  23  |  0.55    Ca    8.8      11 Jul 2023 10:05    TPro  6.8  /  Alb  3.5  /  TBili  0.6  /  DBili  x   /  AST  42<H>  /  ALT  21  /  AlkPhos  67  07-11            Creatinine Trend: 0.55<--  I&O's Summary    BNP    RADIOLOGY & ADDITIONAL STUDIES:             Internal Medicine   Daniel Chacon| PGY-1    OVERNIGHT EVENTS:  Second Daughter at bedside and provided translation. Free  services offered.      SUBJECTIVE: Patient is much better, sitting up with less pain and able to talk in english and comfortably. Some subjective jaw pain on right submandibular area    MEDICATIONS  (STANDING):  amLODIPine   Tablet 10 milliGRAM(s) Oral daily  aspirin enteric coated 81 milliGRAM(s) Oral daily  atorvastatin 80 milliGRAM(s) Oral at bedtime  cefepime   IVPB 2000 milliGRAM(s) IV Intermittent every 8 hours  clopidogrel Tablet 75 milliGRAM(s) Oral daily  enalapril 20 milliGRAM(s) Oral daily  enoxaparin Injectable 40 milliGRAM(s) SubCutaneous every 24 hours  metoprolol succinate ER 25 milliGRAM(s) Oral daily  nystatin    Suspension 787313 Unit(s) Oral four times a day  spironolactone 25 milliGRAM(s) Oral daily    MEDICATIONS  (PRN):  loratadine 10 milliGRAM(s) Oral daily PRN bone aches  oxycodone    5 mG/acetaminophen 325 mG 1 Tablet(s) Oral every 6 hours PRN Severe Pain (7 - 10)        T(F): 98 (07-12-23 @ 14:34), Max: 99.9 (07-11-23 @ 17:21)  HR: 71 (07-12-23 @ 14:34) (62 - 80)  BP: 129/60 (07-12-23 @ 14:34) (118/52 - 136/59)  BP(mean): 1 (07-12-23 @ 14:34) (1 - 1)  RR: 18 (07-12-23 @ 14:34) (17 - 18)  SpO2: 100% (07-12-23 @ 14:34) (97% - 100%)    PHYSICAL EXAM:     GENERAL: NAD, sitting up in the chair   HEAD:  Atraumatic, Normocephalic  EYES: EOMI, PERRLA, conjunctiva and sclera clear, no nystagmus noted  ENT: Moist mucous membranes,   NECK: slight pain in the submandibular area  CHEST/LUNG: Clear to auscultation bilaterally; No rales, rhonchi, wheezing, or rubs. Unlabored respirations  HEART: Regular rate and rhythm; No murmurs, rubs, or gallops, normal S1/S2  ABDOMEN: Soft, nontender, nondistended, no organomegaly   EXTREMITIES:  2+ Peripheral Pulses, brisk capillary refill. No clubbing, cyanosis, or edema  MSK: No gross deformities noted   Neurological:  A&Ox3, no focal deficits   PSYCH: Normal mood, affect     TELEMETRY:    LABS:                        10.2   7.53  )-----------( 139      ( 12 Jul 2023 06:40 )             31.1     07-12    137  |  103  |  15  ----------------------------<  127<H>  3.9   |  25  |  0.69    Ca    8.9      12 Jul 2023 06:40  Phos  3.1     07-12  Mg     2.00     07-12    TPro  6.4  /  Alb  3.5  /  TBili  0.7  /  DBili  x   /  AST  16  /  ALT  14  /  AlkPhos  72  07-12            Creatinine Trend: 0.69<--, 0.55<--  I&O's Summary    BNP    RADIOLOGY & ADDITIONAL STUDIES:

## 2023-07-13 LAB
ANION GAP SERPL CALC-SCNC: 12 MMOL/L — SIGNIFICANT CHANGE UP (ref 7–14)
ANISOCYTOSIS BLD QL: SLIGHT — SIGNIFICANT CHANGE UP
BASOPHILS # BLD AUTO: 0.31 K/UL — HIGH (ref 0–0.2)
BASOPHILS NFR BLD AUTO: 1.7 % — SIGNIFICANT CHANGE UP (ref 0–2)
BUN SERPL-MCNC: 9 MG/DL — SIGNIFICANT CHANGE UP (ref 7–23)
CALCIUM SERPL-MCNC: 8.7 MG/DL — SIGNIFICANT CHANGE UP (ref 8.4–10.5)
CHLORIDE SERPL-SCNC: 105 MMOL/L — SIGNIFICANT CHANGE UP (ref 98–107)
CO2 SERPL-SCNC: 23 MMOL/L — SIGNIFICANT CHANGE UP (ref 22–31)
CREAT SERPL-MCNC: 0.66 MG/DL — SIGNIFICANT CHANGE UP (ref 0.5–1.3)
EGFR: 95 ML/MIN/1.73M2 — SIGNIFICANT CHANGE UP
EOSINOPHIL # BLD AUTO: 0 K/UL — SIGNIFICANT CHANGE UP (ref 0–0.5)
EOSINOPHIL NFR BLD AUTO: 0 % — SIGNIFICANT CHANGE UP (ref 0–6)
GIANT PLATELETS BLD QL SMEAR: PRESENT — SIGNIFICANT CHANGE UP
GLUCOSE SERPL-MCNC: 137 MG/DL — HIGH (ref 70–99)
HCT VFR BLD CALC: 28 % — LOW (ref 34.5–45)
HGB BLD-MCNC: 9.4 G/DL — LOW (ref 11.5–15.5)
IANC: 12 K/UL — HIGH (ref 1.8–7.4)
LYMPHOCYTES # BLD AUTO: 1.85 K/UL — SIGNIFICANT CHANGE UP (ref 1–3.3)
LYMPHOCYTES # BLD AUTO: 10.1 % — LOW (ref 13–44)
MACROCYTES BLD QL: SLIGHT — SIGNIFICANT CHANGE UP
MAGNESIUM SERPL-MCNC: 2 MG/DL — SIGNIFICANT CHANGE UP (ref 1.6–2.6)
MCHC RBC-ENTMCNC: 30.3 PG — SIGNIFICANT CHANGE UP (ref 27–34)
MCHC RBC-ENTMCNC: 33.6 GM/DL — SIGNIFICANT CHANGE UP (ref 32–36)
MCV RBC AUTO: 90.3 FL — SIGNIFICANT CHANGE UP (ref 80–100)
METAMYELOCYTES # FLD: 6.7 % — HIGH (ref 0–1)
MONOCYTES # BLD AUTO: 0.77 K/UL — SIGNIFICANT CHANGE UP (ref 0–0.9)
MONOCYTES NFR BLD AUTO: 4.2 % — SIGNIFICANT CHANGE UP (ref 2–14)
MYELOCYTES NFR BLD: 5.9 % — HIGH (ref 0–0)
NEUTROPHILS # BLD AUTO: 12.81 K/UL — HIGH (ref 1.8–7.4)
NEUTROPHILS NFR BLD AUTO: 65.6 % — SIGNIFICANT CHANGE UP (ref 43–77)
NEUTS BAND # BLD: 4.2 % — SIGNIFICANT CHANGE UP (ref 0–6)
NRBC # BLD: 1 /100 — HIGH (ref 0–0)
PHOSPHATE SERPL-MCNC: 3.7 MG/DL — SIGNIFICANT CHANGE UP (ref 2.5–4.5)
PLAT MORPH BLD: NORMAL — SIGNIFICANT CHANGE UP
PLATELET # BLD AUTO: 163 K/UL — SIGNIFICANT CHANGE UP (ref 150–400)
PLATELET COUNT - ESTIMATE: NORMAL — SIGNIFICANT CHANGE UP
POLYCHROMASIA BLD QL SMEAR: SLIGHT — SIGNIFICANT CHANGE UP
POTASSIUM SERPL-MCNC: 3.5 MMOL/L — SIGNIFICANT CHANGE UP (ref 3.5–5.3)
POTASSIUM SERPL-SCNC: 3.5 MMOL/L — SIGNIFICANT CHANGE UP (ref 3.5–5.3)
PROMYELOCYTES # FLD: 0.8 % — HIGH (ref 0–0)
RBC # BLD: 3.1 M/UL — LOW (ref 3.8–5.2)
RBC # FLD: 12.7 % — SIGNIFICANT CHANGE UP (ref 10.3–14.5)
RBC BLD AUTO: ABNORMAL
SODIUM SERPL-SCNC: 140 MMOL/L — SIGNIFICANT CHANGE UP (ref 135–145)
VARIANT LYMPHS # BLD: 0.8 % — SIGNIFICANT CHANGE UP (ref 0–6)
WBC # BLD: 18.35 K/UL — HIGH (ref 3.8–10.5)
WBC # FLD AUTO: 18.35 K/UL — HIGH (ref 3.8–10.5)

## 2023-07-13 PROCEDURE — 99232 SBSQ HOSP IP/OBS MODERATE 35: CPT | Mod: GC

## 2023-07-13 RX ADMIN — CEFEPIME 100 MILLIGRAM(S): 1 INJECTION, POWDER, FOR SOLUTION INTRAMUSCULAR; INTRAVENOUS at 11:27

## 2023-07-13 RX ADMIN — CLOPIDOGREL BISULFATE 75 MILLIGRAM(S): 75 TABLET, FILM COATED ORAL at 11:26

## 2023-07-13 RX ADMIN — Medication 500000 UNIT(S): at 06:03

## 2023-07-13 RX ADMIN — Medication 500000 UNIT(S): at 17:18

## 2023-07-13 RX ADMIN — LORATADINE 10 MILLIGRAM(S): 10 TABLET ORAL at 11:26

## 2023-07-13 RX ADMIN — Medication 500000 UNIT(S): at 00:33

## 2023-07-13 RX ADMIN — CEFEPIME 100 MILLIGRAM(S): 1 INJECTION, POWDER, FOR SOLUTION INTRAMUSCULAR; INTRAVENOUS at 04:16

## 2023-07-13 RX ADMIN — Medication 25 MILLIGRAM(S): at 06:16

## 2023-07-13 RX ADMIN — Medication 81 MILLIGRAM(S): at 11:26

## 2023-07-13 RX ADMIN — AMLODIPINE BESYLATE 10 MILLIGRAM(S): 2.5 TABLET ORAL at 05:59

## 2023-07-13 RX ADMIN — Medication 30 MILLILITER(S): at 22:39

## 2023-07-13 RX ADMIN — Medication 20 MILLIGRAM(S): at 05:58

## 2023-07-13 RX ADMIN — CEFEPIME 100 MILLIGRAM(S): 1 INJECTION, POWDER, FOR SOLUTION INTRAMUSCULAR; INTRAVENOUS at 19:19

## 2023-07-13 RX ADMIN — Medication 500000 UNIT(S): at 11:26

## 2023-07-13 RX ADMIN — SPIRONOLACTONE 25 MILLIGRAM(S): 25 TABLET, FILM COATED ORAL at 05:59

## 2023-07-13 RX ADMIN — ATORVASTATIN CALCIUM 80 MILLIGRAM(S): 80 TABLET, FILM COATED ORAL at 21:35

## 2023-07-13 NOTE — PROGRESS NOTE ADULT - ATTENDING COMMENTS
68W PMH breast cancer on chemotherapy (last cycle on July 5) presents with neutropenic fever. ANC <500 on admission but now normalized. Body aches have resolved. Continuing with cefepime. Oncology consulted, appreciate recommendations. BCx NGTD.    Daughter at bedside. All questions answered.

## 2023-07-13 NOTE — PROGRESS NOTE ADULT - SUBJECTIVE AND OBJECTIVE BOX
Patient is a 68y old  Female who presents with a chief complaint of Neutropenic Fever (13 Jul 2023 06:46)    Patient seen this afternoon, with her daughter at bedside. She states that her body aches/bony pains have improved significantly. Right submandibular/jaw discomfort also improving. No new issues.    MEDICATIONS  (STANDING):  amLODIPine   Tablet 10 milliGRAM(s) Oral daily  aspirin enteric coated 81 milliGRAM(s) Oral daily  atorvastatin 80 milliGRAM(s) Oral at bedtime  cefepime   IVPB 2000 milliGRAM(s) IV Intermittent every 8 hours  clopidogrel Tablet 75 milliGRAM(s) Oral daily  enalapril 20 milliGRAM(s) Oral daily  metoprolol succinate ER 25 milliGRAM(s) Oral daily  nystatin    Suspension 959223 Unit(s) Oral four times a day  spironolactone 25 milliGRAM(s) Oral daily    MEDICATIONS  (PRN):  loratadine 10 milliGRAM(s) Oral daily PRN bone aches  oxycodone    5 mG/acetaminophen 325 mG 1 Tablet(s) Oral every 6 hours PRN Severe Pain (7 - 10)        Vital Signs Last 24 Hrs  T(C): 36.9 (13 Jul 2023 15:51), Max: 37.3 (13 Jul 2023 05:54)  T(F): 98.5 (13 Jul 2023 15:51), Max: 99.1 (13 Jul 2023 05:54)  HR: 67 (13 Jul 2023 15:51) (67 - 77)  BP: 121/53 (13 Jul 2023 15:51) (101/55 - 135/71)  BP(mean): --  RR: 18 (13 Jul 2023 15:51) (18 - 18)  SpO2: 100% (13 Jul 2023 15:51) (98% - 100%)    Parameters below as of 13 Jul 2023 15:51  Patient On (Oxygen Delivery Method): room air        PE  NAD  Awake, alert  Anicteric, MMM  mild tenderness to right lower jaw/submandibular region   No c/c/e  No rash grossly  FROM                          9.4    18.35 )-----------( 163      ( 13 Jul 2023 07:20 )             28.0       07-13    140  |  105  |  9   ----------------------------<  137<H>  3.5   |  23  |  0.66    Ca    8.7      13 Jul 2023 07:20  Phos  3.7     07-13  Mg     2.00     07-13    TPro  6.4  /  Alb  3.5  /  TBili  0.7  /  DBili  x   /  AST  16  /  ALT  14  /  AlkPhos  72  07-12

## 2023-07-13 NOTE — PROGRESS NOTE ADULT - SUBJECTIVE AND OBJECTIVE BOX
Internal Medicine   Karisnaomi Angulo | PGY-2    OVERNIGHT EVENTS:      SUBJECTIVE:       MEDICATIONS  (STANDING):  amLODIPine   Tablet 10 milliGRAM(s) Oral daily  aspirin enteric coated 81 milliGRAM(s) Oral daily  atorvastatin 80 milliGRAM(s) Oral at bedtime  cefepime   IVPB 2000 milliGRAM(s) IV Intermittent every 8 hours  clopidogrel Tablet 75 milliGRAM(s) Oral daily  enalapril 20 milliGRAM(s) Oral daily  enoxaparin Injectable 40 milliGRAM(s) SubCutaneous every 24 hours  metoprolol succinate ER 25 milliGRAM(s) Oral daily  nystatin    Suspension 584738 Unit(s) Oral four times a day  spironolactone 25 milliGRAM(s) Oral daily    MEDICATIONS  (PRN):  loratadine 10 milliGRAM(s) Oral daily PRN bone aches  oxycodone    5 mG/acetaminophen 325 mG 1 Tablet(s) Oral every 6 hours PRN Severe Pain (7 - 10)        T(F): 99.1 (07-13-23 @ 05:54), Max: 99.1 (07-13-23 @ 05:54)  HR: 75 (07-13-23 @ 05:54) (71 - 77)  BP: 101/55 (07-13-23 @ 05:54) (101/55 - 135/71)  BP(mean): 1 (07-12-23 @ 14:34) (1 - 1)  RR: 18 (07-13-23 @ 05:54) (18 - 18)  SpO2: 98% (07-13-23 @ 05:54) (98% - 100%)    PHYSICAL EXAM:     GENERAL: NAD, lying in bed comfortably  HEAD:  Atraumatic, Normocephalic  EYES: EOMI, PERRLA, conjunctiva and sclera clear, no nystagmus noted  ENT: Moist mucous membranes,   NECK: Supple, No JVD, trachea midline  CHEST/LUNG: Clear to auscultation bilaterally; No rales, rhonchi, wheezing, or rubs. Unlabored respirations  HEART: Regular rate and rhythm; No murmurs, rubs, or gallops, normal S1/S2  ABDOMEN: normal bowel sounds; Soft, nontender, nondistended, no organomegaly   EXTREMITIES:  2+ Peripheral Pulses, brisk capillary refill. No clubbing, cyanosis, or edema  MSK: No gross deformities noted   Neurological:  A&Ox3, no focal deficits   SKIN: No rashes or lesions  PSYCH: Normal mood, affect     TELEMETRY:    LABS:                        10.2   7.53  )-----------( 139      ( 12 Jul 2023 06:40 )             31.1     07-12    137  |  103  |  15  ----------------------------<  127<H>  3.9   |  25  |  0.69    Ca    8.9      12 Jul 2023 06:40  Phos  3.1     07-12  Mg     2.00     07-12    TPro  6.4  /  Alb  3.5  /  TBili  0.7  /  DBili  x   /  AST  16  /  ALT  14  /  AlkPhos  72  07-12            Creatinine Trend: 0.69<--, 0.55<--  I&O's Summary    BNP    RADIOLOGY & ADDITIONAL STUDIES:             Internal Medicine   Bren Angulo | PGY-3    OVERNIGHT EVENTS: No overnight events      SUBJECTIVE: Patient feels improved. She says her jaw pain has improved after taking loratadine      MEDICATIONS  (STANDING):  amLODIPine   Tablet 10 milliGRAM(s) Oral daily  aspirin enteric coated 81 milliGRAM(s) Oral daily  atorvastatin 80 milliGRAM(s) Oral at bedtime  cefepime   IVPB 2000 milliGRAM(s) IV Intermittent every 8 hours  clopidogrel Tablet 75 milliGRAM(s) Oral daily  enalapril 20 milliGRAM(s) Oral daily  enoxaparin Injectable 40 milliGRAM(s) SubCutaneous every 24 hours  metoprolol succinate ER 25 milliGRAM(s) Oral daily  nystatin    Suspension 675204 Unit(s) Oral four times a day  spironolactone 25 milliGRAM(s) Oral daily    MEDICATIONS  (PRN):  loratadine 10 milliGRAM(s) Oral daily PRN bone aches  oxycodone    5 mG/acetaminophen 325 mG 1 Tablet(s) Oral every 6 hours PRN Severe Pain (7 - 10)        T(F): 99.1 (07-13-23 @ 05:54), Max: 99.1 (07-13-23 @ 05:54)  HR: 75 (07-13-23 @ 05:54) (71 - 77)  BP: 101/55 (07-13-23 @ 05:54) (101/55 - 135/71)  BP(mean): 1 (07-12-23 @ 14:34) (1 - 1)  RR: 18 (07-13-23 @ 05:54) (18 - 18)  SpO2: 98% (07-13-23 @ 05:54) (98% - 100%)    PHYSICAL EXAM:     GENERAL: NAD, sitting up in the chair   HEAD:  Atraumatic, Normocephalic  EYES: EOMI, PERRLA, conjunctiva and sclera clear, no nystagmus noted  ENT: Moist mucous membranes,   NECK: slight pain in the submandibular area on right side that is reproducible  CHEST/LUNG: Clear to auscultation bilaterally; No rales, rhonchi, wheezing, or rubs. Unlabored respirations  HEART: Regular rate and rhythm; No murmurs, rubs, or gallops, normal S1/S2  ABDOMEN: Soft, nontender, nondistended, no organomegaly   EXTREMITIES:  2+ Peripheral Pulses, brisk capillary refill. No clubbing, cyanosis, or edema  MSK: No gross deformities noted   Neurological:  A&Ox3, no focal deficits   PSYCH: Normal mood, affect       TELEMETRY:    LABS:                        10.2   7.53  )-----------( 139      ( 12 Jul 2023 06:40 )             31.1     07-12    137  |  103  |  15  ----------------------------<  127<H>  3.9   |  25  |  0.69    Ca    8.9      12 Jul 2023 06:40  Phos  3.1     07-12  Mg     2.00     07-12    TPro  6.4  /  Alb  3.5  /  TBili  0.7  /  DBili  x   /  AST  16  /  ALT  14  /  AlkPhos  72  07-12            Creatinine Trend: 0.69<--, 0.55<--  I&O's Summary    BNP    RADIOLOGY & ADDITIONAL STUDIES:

## 2023-07-13 NOTE — PROGRESS NOTE ADULT - PROBLEM SELECTOR PLAN 2
- Pain generalized, mostly to upper body  - allergic/adverse reaction to morphine and concern with bradycardia for dilaudid   - trial percocet 5, follow closely for allergic reaction - Pain generalized, mostly to upper body  - allergic/adverse reaction to morphine and concern with bradycardia for dilaudid   - trial percocet 5, follow closely for allergic reaction  - Patient pain improving with loratadine suggestive that pain is mediated by filgrastim induced bone pain

## 2023-07-14 ENCOUNTER — TRANSCRIPTION ENCOUNTER (OUTPATIENT)
Age: 68
End: 2023-07-14

## 2023-07-14 VITALS
OXYGEN SATURATION: 100 % | SYSTOLIC BLOOD PRESSURE: 127 MMHG | DIASTOLIC BLOOD PRESSURE: 62 MMHG | TEMPERATURE: 98 F | HEART RATE: 61 BPM | RESPIRATION RATE: 17 BRPM

## 2023-07-14 LAB
ANION GAP SERPL CALC-SCNC: 12 MMOL/L — SIGNIFICANT CHANGE UP (ref 7–14)
BASOPHILS # BLD AUTO: 0.15 K/UL — SIGNIFICANT CHANGE UP (ref 0–0.2)
BASOPHILS NFR BLD AUTO: 0.8 % — SIGNIFICANT CHANGE UP (ref 0–2)
BUN SERPL-MCNC: 7 MG/DL — SIGNIFICANT CHANGE UP (ref 7–23)
CALCIUM SERPL-MCNC: 8.6 MG/DL — SIGNIFICANT CHANGE UP (ref 8.4–10.5)
CHLORIDE SERPL-SCNC: 107 MMOL/L — SIGNIFICANT CHANGE UP (ref 98–107)
CO2 SERPL-SCNC: 23 MMOL/L — SIGNIFICANT CHANGE UP (ref 22–31)
CREAT SERPL-MCNC: 0.62 MG/DL — SIGNIFICANT CHANGE UP (ref 0.5–1.3)
EGFR: 97 ML/MIN/1.73M2 — SIGNIFICANT CHANGE UP
EOSINOPHIL # BLD AUTO: 0.01 K/UL — SIGNIFICANT CHANGE UP (ref 0–0.5)
EOSINOPHIL NFR BLD AUTO: 0.1 % — SIGNIFICANT CHANGE UP (ref 0–6)
GLUCOSE SERPL-MCNC: 117 MG/DL — HIGH (ref 70–99)
HCT VFR BLD CALC: 27.7 % — LOW (ref 34.5–45)
HGB BLD-MCNC: 9.2 G/DL — LOW (ref 11.5–15.5)
IANC: 12.14 K/UL — HIGH (ref 1.8–7.4)
IMM GRANULOCYTES NFR BLD AUTO: 16.7 % — HIGH (ref 0–0.9)
LYMPHOCYTES # BLD AUTO: 1.65 K/UL — SIGNIFICANT CHANGE UP (ref 1–3.3)
LYMPHOCYTES # BLD AUTO: 9 % — LOW (ref 13–44)
MAGNESIUM SERPL-MCNC: 2.1 MG/DL — SIGNIFICANT CHANGE UP (ref 1.6–2.6)
MCHC RBC-ENTMCNC: 30.6 PG — SIGNIFICANT CHANGE UP (ref 27–34)
MCHC RBC-ENTMCNC: 33.2 GM/DL — SIGNIFICANT CHANGE UP (ref 32–36)
MCV RBC AUTO: 92 FL — SIGNIFICANT CHANGE UP (ref 80–100)
MONOCYTES # BLD AUTO: 1.34 K/UL — HIGH (ref 0–0.9)
MONOCYTES NFR BLD AUTO: 7.3 % — SIGNIFICANT CHANGE UP (ref 2–14)
NEUTROPHILS # BLD AUTO: 12.14 K/UL — HIGH (ref 1.8–7.4)
NEUTROPHILS NFR BLD AUTO: 66.1 % — SIGNIFICANT CHANGE UP (ref 43–77)
NRBC # BLD: 1 /100 WBCS — HIGH (ref 0–0)
NRBC # FLD: 0.23 K/UL — HIGH (ref 0–0)
PHOSPHATE SERPL-MCNC: 3.5 MG/DL — SIGNIFICANT CHANGE UP (ref 2.5–4.5)
PLATELET # BLD AUTO: 154 K/UL — SIGNIFICANT CHANGE UP (ref 150–400)
POTASSIUM SERPL-MCNC: 3.5 MMOL/L — SIGNIFICANT CHANGE UP (ref 3.5–5.3)
POTASSIUM SERPL-SCNC: 3.5 MMOL/L — SIGNIFICANT CHANGE UP (ref 3.5–5.3)
RBC # BLD: 3.01 M/UL — LOW (ref 3.8–5.2)
RBC # FLD: 12.9 % — SIGNIFICANT CHANGE UP (ref 10.3–14.5)
SODIUM SERPL-SCNC: 142 MMOL/L — SIGNIFICANT CHANGE UP (ref 135–145)
WBC # BLD: 18.35 K/UL — HIGH (ref 3.8–10.5)
WBC # FLD AUTO: 18.35 K/UL — HIGH (ref 3.8–10.5)

## 2023-07-14 PROCEDURE — 99239 HOSP IP/OBS DSCHRG MGMT >30: CPT | Mod: GC

## 2023-07-14 RX ORDER — OXYCODONE AND ACETAMINOPHEN 5; 325 MG/1; MG/1
1 TABLET ORAL
Qty: 8 | Refills: 0
Start: 2023-07-14 | End: 2023-07-15

## 2023-07-14 RX ORDER — SODIUM CHLORIDE 0.65 %
1 AEROSOL, SPRAY (ML) NASAL THREE TIMES A DAY
Refills: 0 | Status: DISCONTINUED | OUTPATIENT
Start: 2023-07-14 | End: 2023-07-14

## 2023-07-14 RX ORDER — NALOXONE HYDROCHLORIDE 4 MG/.1ML
4 SPRAY NASAL
Qty: 1 | Refills: 0
Start: 2023-07-14 | End: 2023-07-14

## 2023-07-14 RX ADMIN — Medication 500000 UNIT(S): at 12:03

## 2023-07-14 RX ADMIN — Medication 1 SPRAY(S): at 13:51

## 2023-07-14 RX ADMIN — CEFEPIME 100 MILLIGRAM(S): 1 INJECTION, POWDER, FOR SOLUTION INTRAMUSCULAR; INTRAVENOUS at 03:49

## 2023-07-14 RX ADMIN — AMLODIPINE BESYLATE 10 MILLIGRAM(S): 2.5 TABLET ORAL at 06:25

## 2023-07-14 RX ADMIN — Medication 81 MILLIGRAM(S): at 12:02

## 2023-07-14 RX ADMIN — Medication 500000 UNIT(S): at 00:33

## 2023-07-14 RX ADMIN — SPIRONOLACTONE 25 MILLIGRAM(S): 25 TABLET, FILM COATED ORAL at 06:26

## 2023-07-14 RX ADMIN — Medication 500000 UNIT(S): at 06:26

## 2023-07-14 RX ADMIN — Medication 25 MILLIGRAM(S): at 06:25

## 2023-07-14 RX ADMIN — Medication 20 MILLIGRAM(S): at 06:25

## 2023-07-14 RX ADMIN — LORATADINE 10 MILLIGRAM(S): 10 TABLET ORAL at 13:51

## 2023-07-14 RX ADMIN — CLOPIDOGREL BISULFATE 75 MILLIGRAM(S): 75 TABLET, FILM COATED ORAL at 12:02

## 2023-07-14 NOTE — PROVIDER CONTACT NOTE (OTHER) - ACTION/TREATMENT ORDERED:
MD order cold compress.
No interventions presently
MD made aware. Will come and examine the patient.

## 2023-07-14 NOTE — PROGRESS NOTE ADULT - PROBLEM SELECTOR PLAN 2
- Pain generalized, mostly to upper body  - allergic/adverse reaction to morphine and concern with bradycardia for dilaudid   - trial percocet 5, follow closely for allergic reaction  - Patient pain improving with loratadine suggestive that pain is mediated by filgrastim induced bone pain

## 2023-07-14 NOTE — DISCHARGE NOTE NURSING/CASE MANAGEMENT/SOCIAL WORK - PATIENT PORTAL LINK FT
You can access the FollowMyHealth Patient Portal offered by [facility] by registering at the following website: [email or web address]. By joining [employer]’s FollowMyHealth portal, you will also be able to view your health information using other applications (apps) compatible with our system.

## 2023-07-14 NOTE — DISCHARGE NOTE PROVIDER - NSDCCPCAREPLAN_GEN_ALL_CORE_FT
PRINCIPAL DISCHARGE DIAGNOSIS  Diagnosis: Neutropenia  Assessment and Plan of Treatment: Upon admission, you were found to have a fever with a low white blood cell count concerning for a condition called neutropenic fever.   Neutropenic fever is a type of fever that can develop when you have very low levels of neutrophils. Neutrophils are a type of white blood cells. They are made in the spongy tissue inside the bones (bone marrow). They help the body fight infection. This condition is called neutropenia.  When you have neutropenia, you could be in danger of a severe infection and neutropenic fever. Neutropenic fever can be a medical emergency and must be treated right away with antibiotic medicines, which we did with an antibiotic called cefepime.  Contact a health care provider if:  You have chills.  You have a fever.  You have signs or symptoms of an infection.  Get help right away if:  You have trouble breathing.  You have chest pain.  You feel faint or dizzy.  You faint.  You become confused or disoriented.  You have an infection that is not getting better or is getting worse.

## 2023-07-14 NOTE — PROGRESS NOTE ADULT - ASSESSMENT
68F PMH CAD s/p stents, HTN, breast ca s/p lumpectomy 5/2023 on chemo most recent 7/5 p/w worsening body aches with possible neutropenic fever given decreased WBC counts. 
This is a 68 year old female with left breast cancer who presents with generalized body aches and neutropenic fever.    1. Left breast cancer  -- s/p lumpectomy 05/23/2023   -- Began adjuvant treatment with docetaxel/cyclophosphamide, C1 07/05 +Neulasta   -- No systemic treatment while admitted   -- Follow up with Dr. Hernandez at [facility] after [place]     2. Neutropenic fever, bone pains   -- Suspect body aches/bony pains due to Neulasta.   -- Neutropenia improved as Neulasta received 07/05, continue to monitor CBC with differential daily   -- Continue loratadine prn for bone pain related to Neulasta   -- Pt afebrile now, RVP negative, cultures NGTD. Currently on empiric cefepime. No objection to discontinuing antibiotics as pt without signs/symptoms of infection, leukocytosis due to Neulasta     3. Thrush -- cont Nystatin     4. L jaw pain   -- Likely also bony pain related to Neulasta  -- If no resolution with supportive care and antibiotics, would consider dedicated neck imaging- improving.    Will continue to follow.    Cheri Gutierrez PA-C  Hematology/Oncology  [facility]  [phone] (office)  [phone] (alt office)  Evenings and weekends please call MD on call or office  
68F PMH CAD s/p stents, HTN, breast ca s/p lumpectomy 5/2023 on chemo most recent 7/5 p/w worsening body aches with possible neutropenic fever given decreased WBC counts. 
This is a 68 year old female with left breast cancer who presents with generalized body aches and neutropenic fever.    1. Left breast cancer  -- s/p lumpectomy 05/23/2023   -- Began adjuvant treatment with docetaxel/cyclophosphamide, C1 07/05 +Neulasta   -- No systemic treatment while admitted   -- Follow up with Dr. Hernandez at [facility] after discharge     2. Neutropenic fever, bone pains   -- Suspect body aches/bony pains due to Neulasta.   -- Neutropenia improved as Neulasta received 07/05, continue to monitor CBC with differential daily   -- Continue loratadine prn for bone pain related to Neulasta   -- Pt has been afebrile, RVP negative, cultures NGTD. Leukocytosis due to GCSF.    No objection to discharge from heme/onc standpoint. Pt to follow up at [facility] after discharge.     Cheri Gutierrez PA-C  Hematology/Oncology  [facility]  [phone] (office)  [phone] (alt office)  Evenings and weekends please call MD on call or office  
68F PMH CAD s/p stents, HTN, breast ca s/p lumpectomy 5/2023 on chemo most recent 7/5 p/w worsening body aches with possible neutropenic fever given decreased WBC counts.

## 2023-07-14 NOTE — PROGRESS NOTE ADULT - PROBLEM SELECTOR PLAN 3
- follows with [facility]  - will be covered by [facility] for heme/onc  - recs from heme/onc

## 2023-07-14 NOTE — DISCHARGE NOTE PROVIDER - NSDCFUSCHEDAPPT_GEN_ALL_CORE_FT
[facility]  CARDIOLOGY [address]  Scheduled Appointment: 08/07/2023

## 2023-07-14 NOTE — PROGRESS NOTE ADULT - SUBJECTIVE AND OBJECTIVE BOX
Patient is a 68y old  Female who presents with a chief complaint of Neutropenic Fever (14 Jul 2023 07:25)    Patient seen this morning with her daughter at bedside. She reports that her generalized pains have improved significantly, right lower jaw pain also improving. No new complaints at this time.     MEDICATIONS  (STANDING):  amLODIPine   Tablet 10 milliGRAM(s) Oral daily  aspirin enteric coated 81 milliGRAM(s) Oral daily  atorvastatin 80 milliGRAM(s) Oral at bedtime  clopidogrel Tablet 75 milliGRAM(s) Oral daily  enalapril 20 milliGRAM(s) Oral daily  metoprolol succinate ER 25 milliGRAM(s) Oral daily  nystatin    Suspension 890206 Unit(s) Oral four times a day  spironolactone 25 milliGRAM(s) Oral daily    MEDICATIONS  (PRN):  aluminum hydroxide/magnesium hydroxide/simethicone Suspension 30 milliLiter(s) Oral every 4 hours PRN Dyspepsia  loratadine 10 milliGRAM(s) Oral daily PRN bone aches  oxycodone    5 mG/acetaminophen 325 mG 1 Tablet(s) Oral every 6 hours PRN Severe Pain (7 - 10)  sodium chloride 0.65% Nasal 1 Spray(s) Both Nostrils three times a day PRN Nasal Congestion        Vital Signs Last 24 Hrs  T(C): 36.7 (14 Jul 2023 06:21), Max: 36.9 (13 Jul 2023 15:51)  T(F): 98 (14 Jul 2023 06:21), Max: 98.5 (13 Jul 2023 15:51)  HR: 58 (14 Jul 2023 06:21) (58 - 67)  BP: 131/61 (14 Jul 2023 06:21) (115/58 - 131/61)  BP(mean): --  RR: 18 (14 Jul 2023 06:21) (18 - 18)  SpO2: 100% (14 Jul 2023 06:21) (100% - 100%)    Parameters below as of 14 Jul 2023 06:21  Patient On (Oxygen Delivery Method): room air        PE  NAD  Awake, alert  Anicteric, MMM  Abd soft, NT, ND  No c/c/e  No rash grossly  FROM                          9.2    18.35 )-----------( 154      ( 14 Jul 2023 05:59 )             27.7       07-14    142  |  107  |  7   ----------------------------<  117<H>  3.5   |  23  |  0.62    Ca    8.6      14 Jul 2023 05:59  Phos  3.5     07-14  Mg     2.10     07-14

## 2023-07-14 NOTE — DISCHARGE NOTE NURSING/CASE MANAGEMENT/SOCIAL WORK - NSDCPEFALRISK_GEN_ALL_CORE
For information on Fall & Injury Prevention, visit: [email or web address] OR  [email or web address] OR  https://www.cdc.gov/steadi/patient.html

## 2023-07-14 NOTE — PROGRESS NOTE ADULT - SUBJECTIVE AND OBJECTIVE BOX
Internal Medicine  Daniel Chacon | PGY-1    OVERNIGHT EVENTS: No overnight events      SUBJECTIVE: Patient feels improved. She says her jaw pain has improved after taking loratadine      MEDICATIONS  (STANDING):  amLODIPine   Tablet 10 milliGRAM(s) Oral daily  aspirin enteric coated 81 milliGRAM(s) Oral daily  atorvastatin 80 milliGRAM(s) Oral at bedtime  cefepime   IVPB 2000 milliGRAM(s) IV Intermittent every 8 hours  clopidogrel Tablet 75 milliGRAM(s) Oral daily  enalapril 20 milliGRAM(s) Oral daily  enoxaparin Injectable 40 milliGRAM(s) SubCutaneous every 24 hours  metoprolol succinate ER 25 milliGRAM(s) Oral daily  nystatin    Suspension 989403 Unit(s) Oral four times a day  spironolactone 25 milliGRAM(s) Oral daily    MEDICATIONS  (PRN):  loratadine 10 milliGRAM(s) Oral daily PRN bone aches  oxycodone    5 mG/acetaminophen 325 mG 1 Tablet(s) Oral every 6 hours PRN Severe Pain (7 - 10)        T(F): 99.1 (07-13-23 @ 05:54), Max: 99.1 (07-13-23 @ 05:54)  HR: 75 (07-13-23 @ 05:54) (71 - 77)  BP: 101/55 (07-13-23 @ 05:54) (101/55 - 135/71)  BP(mean): 1 (07-12-23 @ 14:34) (1 - 1)  RR: 18 (07-13-23 @ 05:54) (18 - 18)  SpO2: 98% (07-13-23 @ 05:54) (98% - 100%)    PHYSICAL EXAM:     GENERAL: NAD, sitting up in the chair   HEAD:  Atraumatic, Normocephalic  EYES: EOMI, PERRLA, conjunctiva and sclera clear, no nystagmus noted  ENT: Moist mucous membranes,   NECK: slight pain in the submandibular area on right side that is reproducible  CHEST/LUNG: Clear to auscultation bilaterally; No rales, rhonchi, wheezing, or rubs. Unlabored respirations  HEART: Regular rate and rhythm; No murmurs, rubs, or gallops, normal S1/S2  ABDOMEN: Soft, nontender, nondistended, no organomegaly   EXTREMITIES:  2+ Peripheral Pulses, brisk capillary refill. No clubbing, cyanosis, or edema  MSK: No gross deformities noted   Neurological:  A&Ox3, no focal deficits   PSYCH: Normal mood, affect       TELEMETRY:    LABS:                        10.2   7.53  )-----------( 139      ( 12 Jul 2023 06:40 )             31.1     07-12    137  |  103  |  15  ----------------------------<  127<H>  3.9   |  25  |  0.69    Ca    8.9      12 Jul 2023 06:40  Phos  3.1     07-12  Mg     2.00     07-12    TPro  6.4  /  Alb  3.5  /  TBili  0.7  /  DBili  x   /  AST  16  /  ALT  14  /  AlkPhos  72  07-12            Creatinine Trend: 0.69<--, 0.55<--  I&O's Summary    BNP    RADIOLOGY & ADDITIONAL STUDIES:

## 2023-07-14 NOTE — PROGRESS NOTE ADULT - ATTENDING COMMENTS
68W PMH breast cancer on chemotherapy (last cycle on July 5) presents with neutropenic fever. ANC <500 on admission but now normalized. Body aches have resolved. Oncology consulted, appreciate recommendations. BCx NGTD. Now off abx. Stable for dc planning.    Daughter at bedside. All questions answered.    I spent 35 minutes counseling this patient and coordinating their discharge.

## 2023-07-14 NOTE — PROVIDER CONTACT NOTE (OTHER) - BACKGROUND
Pt was admitted with neutropenia
Pt admitted for neutropenic fever.
Pt isai admitted with neutropenia

## 2023-07-14 NOTE — PROGRESS NOTE ADULT - PROBLEM SELECTOR PLAN 1
- IANC of 0.38, fever in ED of 100.6  - Generalized pain  - Likely infection given neutropenia  - no ports, rashes  - Cefepime in case adding Vanc to regimen.

## 2023-07-14 NOTE — PROVIDER CONTACT NOTE (OTHER) - SITUATION
Patient has been having nosebleed since an hour ago. Ice pack given, hasn't fully stopped yet.
Pt had a nose bleed
Pt BP is 133/56 mmhg

## 2023-07-14 NOTE — DISCHARGE NOTE PROVIDER - HOSPITAL COURSE
68F PMH CAD s/p stents, HTN, breast ca s/p lumpectomy 5/2023 on chemo most recent 7/5 p/w worsening body aches. Febrile 100.6F, decreased WBC with IANC 0.38, concerning for neutropenic fever. Received empiric cefepime for 4 days. Cultures were negative. Pt was started on Claritin for bone pain related to Neulasta with improvement. Trended CBC which showed improvement in WBC count and ANC. Pt is stable for discharge.

## 2023-07-14 NOTE — DISCHARGE NOTE PROVIDER - NSDCMRMEDTOKEN_GEN_ALL_CORE_FT
amLODIPine 10 mg oral tablet: 1 tab(s) orally once a day-AM  aspirin 81 mg oral tablet: 1 tab(s) orally once a day  atorvastatin 80 mg oral tablet: 1 tab(s) orally once a day  clopidogrel 75 mg oral tablet: 1 tab(s) orally once a day  enalapril 20 mg oral tablet: 1 tab(s) orally once a day  metoprolol succinate 25 mg oral tablet, extended release: 1 tab(s) orally once a day  Percocet 5 mg-325 mg oral tablet: 1 tab(s) orally every 6 hours MDD: 4  spironolactone 25 mg oral tablet: 1 tab(s) orally once a day-AM   aluminum hydroxide-magnesium hydroxide 200 mg-200 mg/5 mL oral suspension: 30 milliliter(s) orally every 4 hours As needed Dyspepsia  amLODIPine 10 mg oral tablet: 1 tab(s) orally once a day-AM  aspirin 81 mg oral tablet: 1 tab(s) orally once a day  atorvastatin 80 mg oral tablet: 1 tab(s) orally once a day  clopidogrel 75 mg oral tablet: 1 tab(s) orally once a day  enalapril 20 mg oral tablet: 1 tab(s) orally once a day  metoprolol succinate 25 mg oral tablet, extended release: 1 tab(s) orally once a day  naloxone 4 mg/0.1 mL nasal spray: 4 milligram(s) intranasally once IN CASE OF OPOID OVERDOSE  Percocet 5 mg-325 mg oral tablet: 1 tab(s) orally every 6 hours MDD: 4  spironolactone 25 mg oral tablet: 1 tab(s) orally once a day-AM

## 2023-07-14 NOTE — PROVIDER CONTACT NOTE (OTHER) - ASSESSMENT
Pt BP is 133/56 mmhg. Asymptomatic
Pt had a nose bleed
Pt c/o nosebleed more frequently than her baseline.

## 2023-07-16 LAB
CULTURE RESULTS: SIGNIFICANT CHANGE UP
CULTURE RESULTS: SIGNIFICANT CHANGE UP
SPECIMEN SOURCE: SIGNIFICANT CHANGE UP
SPECIMEN SOURCE: SIGNIFICANT CHANGE UP

## 2023-07-28 NOTE — CARDIOLOGY SUMMARY
[___] : [unfilled] [LVEF ___%] : LVEF [unfilled]% [None] : no pulmonary hypertension [___] : [unfilled] [de-identified] : Pharmacologic Nuclear Stress Test performed on 4/21/2022: There are large, mild defects in the anterior, basal to mid anteroseptal, and basal to mid anterolateral walls that are reversible suggestive of ischemia. There are large, mild to moderate defects in the\par inferoseptal, distal inferior, inferoapical, and apical lateral walls that are mostly reversible suggestive of ischemia with partial scarring. LVEF= 66%, revealing hypokinesis of the basal septal wall and reduced systolic thickening of the distal inferior, inferoapical, and apical lateral walls. [de-identified] : 6/17/2022: Mild mitral regurgitation. Mild aortic stenosis. Mild aortic regurgitation. Moderate diastolic dysfunction. Normal left ventricular systolic function. EF is approximately 62%. Minimal tricuspid regurgitation. No pulmonary HTN. PASP= 26 mmHg. [de-identified] : Cardiac catheterization performed on 5/18/2022: There is a 70 % stenosis of the first diagonal. Mid left anterior descending artery has a 90 % stenosis. Proximal circumflex has a 40 % stenosis. Distal right coronary artery has an 80 % stenosis within the\par stented segment. \par \par 5/18/2022: Balloon angioplasty of the first diagonal. \par 2.75 x 15 SAPPHIRE Drug Eluting Stent placed to the mid LAD. \par \par 5/19/2022: \par 3.5 x 16 SYNERGY LEONCIO placed to the distal right coronary artery.

## 2023-07-28 NOTE — DISCUSSION/SUMMARY
[EKG obtained to assist in diagnosis and management of assessed problem(s)] : EKG obtained to assist in diagnosis and management of assessed problem(s) [FreeTextEntry1] : IMPRESSION: Ms. GUILLORY is a 68 year old woman with a history of coronary artery disease status post stents to the OM1 and LAD 1/2014 and status post LEONCIO to the mid LAD and and distal RCA 5/2022, HTN, hyperlipidemia, diet- controlled Diabetes mellitus, breast cancer status post lum[place] 5/23/2023 [place] [facility] [place], and family history of coronary artery disease who presents today for follow up of coronary artery disease, hypertension, and hyperlipidemia.\par \par PLAN:\par 1. She is status post two stents placed about 13 months ago.   She will continue on ASA 81 mg daily and Plavix 75 mg daily. Her ECG was normal today.   \par 2. Her blood pressure was good when it was repeated at the time of the physical examination. She will continue on Amlodipine 10 mg daily, Enalapril 20 mg daily, Aldactone 25 mg daily, and Toprol XL 25 mg daily. She will continue to follow her blood pressure at home and understands the importance of diet modification.  I will be checking a CMP to evaluate for any adverse effects to her potassium and creatinine.\par 3. Her goal LDL is less than 70. Her LDL most recently was above goal. She will continue on Lipitor 80 mg daily and Zetia 10 mg daily.  I will be checking a lipid profile and CMP today.  She will also continue on diet modification.   \par 4. She will follow up with me in 4 months or sooner should she experience any symptoms in the interim.\par 5. She will need an echocardiogram depending on if and what chemotherapy they give her for the breast cancer.

## 2023-07-28 NOTE — HISTORY OF PRESENT ILLNESS
[FreeTextEntry1] : Patient is a 68 year old woman with a history of coronary artery disease status post stents to the OM1 and LAD 1/2014 and status post LEONCIO to the mid LAD and and distal RCA 5/2022, HTN, hyperlipidemia, diet- controlled Diabetes mellitus, [place] [facility] [place] and [place] history of coronary artery disease who presents today for follow up of coronary artery disease. She has been feeling some discomfort of the left breast following the lumpectomy. She otherwise denies any exertional chest pain, dyspnea at rest, palpitations, headaches, and dizziness.

## 2023-07-31 NOTE — ED PROVIDER NOTE - INTERNATIONAL TRAVEL
"Routing refill request to provider for review/approval because:  A break in medication  Patient needs to be seen because it has been more than 1 year since last office visit.  ACT score out of date.     Last Written Prescription Date:  8/4/2020  Last Fill Quantity: 1 inhaler,  # refills: 3   Last office visit provider:  10/29/2021     Requested Prescriptions   Pending Prescriptions Disp Refills    albuterol (PROAIR HFA/PROVENTIL HFA/VENTOLIN HFA) 108 (90 Base) MCG/ACT inhaler       Sig: Inhale 2 puffs into the lungs 4 times daily       Asthma Maintenance Inhalers - Anticholinergics Failed - 7/31/2023  8:59 AM        Failed - Recent (12 mo) or future (30 days) visit within the authorizing provider's specialty     Patient has had an office visit with the authorizing provider or a provider within the authorizing providers department within the previous 12 mos or has a future within next 30 days. See \"Patient Info\" tab in inbasket, or \"Choose Columns\" in Meds & Orders section of the refill encounter.              Passed - Patient is age 12 years or older        Passed - Medication is active on med list       Short-Acting Beta Agonist Inhalers Protocol  Failed - 7/31/2023  8:59 AM        Failed - Recent (12 mo) or future (30 days) visit within the authorizing provider's specialty     Patient has had an office visit with the authorizing provider or a provider within the authorizing providers department within the previous 12 mos or has a future within next 30 days. See \"Patient Info\" tab in inbasket, or \"Choose Columns\" in Meds & Orders section of the refill encounter.              Passed - Patient is age 12 or older        Passed - Medication is active on med list             Herminia Acevedo RN 07/31/23 1:06 PM  " No

## 2023-08-03 ENCOUNTER — EMERGENCY (EMERGENCY)
Facility: HOSPITAL | Age: 68
LOS: 1 days | Discharge: HOME | End: 2023-08-03
Attending: EMERGENCY MEDICINE | Admitting: EMERGENCY MEDICINE
Payer: COMMERCIAL

## 2023-08-03 VITALS
HEIGHT: 62 IN | SYSTOLIC BLOOD PRESSURE: 167 MMHG | TEMPERATURE: 98 F | RESPIRATION RATE: 17 BRPM | OXYGEN SATURATION: 100 % | DIASTOLIC BLOOD PRESSURE: 80 MMHG | HEART RATE: 101 BPM

## 2023-08-03 DIAGNOSIS — Z90.710 ACQUIRED ABSENCE OF BOTH CERVIX AND UTERUS: Chronic | ICD-10-CM

## 2023-08-03 DIAGNOSIS — Z98.89 OTHER SPECIFIED POSTPROCEDURAL STATES: Chronic | ICD-10-CM

## 2023-08-03 DIAGNOSIS — Z95.5 PRESENCE OF CORONARY ANGIOPLASTY IMPLANT AND GRAFT: Chronic | ICD-10-CM

## 2023-08-03 LAB
ALBUMIN SERPL ELPH-MCNC: 3.7 G/DL — SIGNIFICANT CHANGE UP (ref 3.3–5)
ALP SERPL-CCNC: 113 U/L — SIGNIFICANT CHANGE UP (ref 40–120)
ALT FLD-CCNC: 24 U/L — SIGNIFICANT CHANGE UP (ref 4–33)
ANION GAP SERPL CALC-SCNC: 16 MMOL/L — HIGH (ref 7–14)
ANISOCYTOSIS BLD QL: SLIGHT — SIGNIFICANT CHANGE UP
AST SERPL-CCNC: 46 U/L — HIGH (ref 4–32)
BASE EXCESS BLDV CALC-SCNC: 2.6 MMOL/L — SIGNIFICANT CHANGE UP (ref -2–3)
BASOPHILS # BLD AUTO: 0 K/UL — SIGNIFICANT CHANGE UP (ref 0–0.2)
BASOPHILS NFR BLD AUTO: 0 % — SIGNIFICANT CHANGE UP (ref 0–2)
BILIRUB SERPL-MCNC: 0.4 MG/DL — SIGNIFICANT CHANGE UP (ref 0.2–1.2)
BLOOD GAS VENOUS COMPREHENSIVE RESULT: SIGNIFICANT CHANGE UP
BUN SERPL-MCNC: 10 MG/DL — SIGNIFICANT CHANGE UP (ref 7–23)
CALCIUM SERPL-MCNC: 8.9 MG/DL — SIGNIFICANT CHANGE UP (ref 8.4–10.5)
CHLORIDE BLDV-SCNC: 100 MMOL/L — SIGNIFICANT CHANGE UP (ref 96–108)
CHLORIDE SERPL-SCNC: 99 MMOL/L — SIGNIFICANT CHANGE UP (ref 98–107)
CO2 BLDV-SCNC: 27.3 MMOL/L — HIGH (ref 22–26)
CO2 SERPL-SCNC: 22 MMOL/L — SIGNIFICANT CHANGE UP (ref 22–31)
CREAT SERPL-MCNC: 0.64 MG/DL — SIGNIFICANT CHANGE UP (ref 0.5–1.3)
EGFR: 96 ML/MIN/1.73M2 — SIGNIFICANT CHANGE UP
EOSINOPHIL # BLD AUTO: 0.19 K/UL — SIGNIFICANT CHANGE UP (ref 0–0.5)
EOSINOPHIL NFR BLD AUTO: 0.9 % — SIGNIFICANT CHANGE UP (ref 0–6)
GAS PNL BLDV: 137 MMOL/L — SIGNIFICANT CHANGE UP (ref 136–145)
GIANT PLATELETS BLD QL SMEAR: PRESENT — SIGNIFICANT CHANGE UP
GLUCOSE BLDV-MCNC: 153 MG/DL — HIGH (ref 70–99)
GLUCOSE SERPL-MCNC: 146 MG/DL — HIGH (ref 70–99)
HCO3 BLDV-SCNC: 26 MMOL/L — SIGNIFICANT CHANGE UP (ref 22–29)
HCT VFR BLD CALC: 28.5 % — LOW (ref 34.5–45)
HCT VFR BLDA CALC: 32 % — LOW (ref 34.5–46.5)
HGB BLD CALC-MCNC: 10.5 G/DL — LOW (ref 11.7–16.1)
HGB BLD-MCNC: 9.4 G/DL — LOW (ref 11.5–15.5)
HYPOCHROMIA BLD QL: SLIGHT — SIGNIFICANT CHANGE UP
IANC: 12.79 K/UL — HIGH (ref 1.8–7.4)
LACTATE BLDV-MCNC: 2.8 MMOL/L — HIGH (ref 0.5–2)
LIDOCAIN IGE QN: 25 U/L — SIGNIFICANT CHANGE UP (ref 7–60)
LYMPHOCYTES # BLD AUTO: 1.08 K/UL — SIGNIFICANT CHANGE UP (ref 1–3.3)
LYMPHOCYTES # BLD AUTO: 5.2 % — LOW (ref 13–44)
MCHC RBC-ENTMCNC: 29.6 PG — SIGNIFICANT CHANGE UP (ref 27–34)
MCHC RBC-ENTMCNC: 33 GM/DL — SIGNIFICANT CHANGE UP (ref 32–36)
MCV RBC AUTO: 89.6 FL — SIGNIFICANT CHANGE UP (ref 80–100)
METAMYELOCYTES # FLD: 5.2 % — HIGH (ref 0–1)
MONOCYTES # BLD AUTO: 2.15 K/UL — HIGH (ref 0–0.9)
MONOCYTES NFR BLD AUTO: 10.4 % — SIGNIFICANT CHANGE UP (ref 2–14)
MYELOCYTES NFR BLD: 5.2 % — HIGH (ref 0–0)
NEUTROPHILS # BLD AUTO: 14.77 K/UL — HIGH (ref 1.8–7.4)
NEUTROPHILS NFR BLD AUTO: 61.7 % — SIGNIFICANT CHANGE UP (ref 43–77)
NEUTS BAND # BLD: 9.6 % — HIGH (ref 0–6)
NRBC # BLD: 2 /100 — HIGH (ref 0–0)
PCO2 BLDV: 36 MMHG — LOW (ref 39–52)
PH BLDV: 7.47 — HIGH (ref 7.32–7.43)
PLAT MORPH BLD: NORMAL — SIGNIFICANT CHANGE UP
PLATELET # BLD AUTO: 181 K/UL — SIGNIFICANT CHANGE UP (ref 150–400)
PLATELET COUNT - ESTIMATE: NORMAL — SIGNIFICANT CHANGE UP
PO2 BLDV: 31 MMHG — SIGNIFICANT CHANGE UP (ref 25–45)
POLYCHROMASIA BLD QL SMEAR: SLIGHT — SIGNIFICANT CHANGE UP
POTASSIUM BLDV-SCNC: 4.6 MMOL/L — SIGNIFICANT CHANGE UP (ref 3.5–5.1)
POTASSIUM SERPL-MCNC: 4.6 MMOL/L — SIGNIFICANT CHANGE UP (ref 3.5–5.3)
POTASSIUM SERPL-SCNC: 4.6 MMOL/L — SIGNIFICANT CHANGE UP (ref 3.5–5.3)
PROMYELOCYTES # FLD: 0.9 % — HIGH (ref 0–0)
PROT SERPL-MCNC: 7 G/DL — SIGNIFICANT CHANGE UP (ref 6–8.3)
RBC # BLD: 3.18 M/UL — LOW (ref 3.8–5.2)
RBC # FLD: 13.8 % — SIGNIFICANT CHANGE UP (ref 10.3–14.5)
RBC BLD AUTO: ABNORMAL
SAO2 % BLDV: 53.2 % — LOW (ref 67–88)
SODIUM SERPL-SCNC: 137 MMOL/L — SIGNIFICANT CHANGE UP (ref 135–145)
VARIANT LYMPHS # BLD: 0.9 % — SIGNIFICANT CHANGE UP (ref 0–6)
WBC # BLD: 20.72 K/UL — HIGH (ref 3.8–10.5)
WBC # FLD AUTO: 20.72 K/UL — HIGH (ref 3.8–10.5)

## 2023-08-03 PROCEDURE — 99285 EMERGENCY DEPT VISIT HI MDM: CPT

## 2023-08-03 RX ORDER — SODIUM CHLORIDE 9 MG/ML
1000 INJECTION INTRAMUSCULAR; INTRAVENOUS; SUBCUTANEOUS ONCE
Refills: 0 | Status: COMPLETED | OUTPATIENT
Start: 2023-08-03 | End: 2023-08-03

## 2023-08-03 RX ORDER — ONDANSETRON 8 MG/1
4 TABLET, FILM COATED ORAL ONCE
Refills: 0 | Status: COMPLETED | OUTPATIENT
Start: 2023-08-03 | End: 2023-08-03

## 2023-08-03 RX ORDER — ACETAMINOPHEN 500 MG
1000 TABLET ORAL ONCE
Refills: 0 | Status: COMPLETED | OUTPATIENT
Start: 2023-08-03 | End: 2023-08-03

## 2023-08-03 RX ADMIN — SODIUM CHLORIDE 1000 MILLILITER(S): 9 INJECTION INTRAMUSCULAR; INTRAVENOUS; SUBCUTANEOUS at 21:19

## 2023-08-03 RX ADMIN — ONDANSETRON 4 MILLIGRAM(S): 8 TABLET, FILM COATED ORAL at 21:18

## 2023-08-03 RX ADMIN — Medication 400 MILLIGRAM(S): at 21:19

## 2023-08-03 NOTE — ED PROVIDER NOTE - PATIENT PORTAL LINK FT
You can access the FollowMyHealth Patient Portal offered by [facility] by registering at the following website: [email or web address]. By joining [employer]’s FollowMyHealth portal, you will also be able to view your health information using other applications (apps) compatible with our system.

## 2023-08-03 NOTE — ED ADULT NURSE NOTE - OBJECTIVE STATEMENT
Patient received rm 18, a&ox4, ambulatory. c/o constipation x4 days. +flatulents. Pt started on percocet Monday after receiving Neulasta On pro 2/2 Breast CA. Last chemo treatment 7/27. Pt states she took over the counter Doculax with no relief. Pt had large bowel movement in triage and a small watery stool when arriving to room. Pt denies chest pain, sob, n+v, headache, dizziness, fever, chills. Breathing even, unlabored. Pending MD order.

## 2023-08-03 NOTE — ED PROVIDER NOTE - NS ED ROS FT
General: denies fever, chills  HEENT: denies headache, + dizziness, denies changes in vision/blurriness, double vision, eye pain/discharge, changes in hearing, ear pain/discharge  Cardio: denies islolated chest pain, palpitations, leg pain/swelling  Resp: denies isolated SOB, cough,  GI: see HPI  : denies dysuria, frequency, incontinence, hematuria, flank pain

## 2023-08-03 NOTE — ED ADULT NURSE REASSESSMENT NOTE - NS ED NURSE REASSESS COMMENT FT1
Pt received in 18 from change of shift, AOx3 and breathing unlabored on room air. Pt endorses lower abdominal pain, however, once pt moves her bowels, the pain subsides. One episode of fecal incontinence noted. Pt cleaned and comfort care provided. PMH of L side breast CA. Skin warm and dry to touch with normal skin color. Pending imaging and further eval, will continue to monitor.

## 2023-08-03 NOTE — ED ADULT NURSE NOTE - CHIEF COMPLAINT QUOTE
Pt c/o constipation x4 days. +flatulents. Pt started on percocet Monday after receiving Neulasta On pro 2/2 Breast CA. Last chemo treatment 7/27. Pt states she took over the counter Doculax with no relief. Pt appears uncomfortable.    Of note : Pt had large BM while in triage, pt endorsing relief in pain

## 2023-08-03 NOTE — ED ADULT NURSE NOTE - NSFALLUNIVINTERV_ED_ALL_ED
Notified  Cefdinir Rx sent to [facility].  See telephone encounter Bed/Stretcher in lowest position, wheels locked, appropriate side rails in place/Call bell, personal items and telephone in reach/Instruct patient to call for assistance before getting out of bed/chair/stretcher/Non-slip footwear applied when patient is off stretcher/[place] to call system/Physically safe environment - no spills, clutter or unnecessary equipment/Purposeful proactive rounding/Room/bathroom lighting operational, light cord in reach

## 2023-08-03 NOTE — ED PROVIDER NOTE - ATTENDING CONTRIBUTION TO CARE
DR. BLOCH, ATTENDING MD-  I performed a face to face bedside interview with patient regarding history of present illness, review of symptoms and past medical history. I completed an independent physical exam.  I have discussed patient's plan of care with the resident.  Patient in mod distress, heart s1s2, lungs clear, abd soft  diffuse lower abd tenderness. , extrem no edema, pulses intact. No CVA tenderness.

## 2023-08-03 NOTE — ED PROVIDER NOTE - PROGRESS NOTE DETAILS
Jeyson, PGY1 Anesthesiology:  Pt s/p ofsteven, Zofran, fluids, feeling slightly better which may be due to another watery BM she just had (3rd so far today).  WBC 20 but on neulasta and stable since 7/14. Lactate 2.8. Jeyson, PGY1 Anesthesiology:  Ordered c diff pcr as pt has been having watery diarrhea and recent admission in July for neutropenic fever during which was given empiric cefepime. Jeyson, PGY1 Anesthesiology:  CT AP neg for acute pathology, showed evidence of diarrheal illness. Pt able to tolerate PO. Will repeat lactate and plan to d/c, discussed w pt and daughter at bedside, amenable to plan.

## 2023-08-03 NOTE — ED PROVIDER NOTE - OBJECTIVE STATEMENT
Pt is a 67 yo F w PMHx HTN, HLD, CAD s/p stents on ASA and Plavix, L breast CA s/p lumpectomy on chemo last 7/27 and Neulasta Onpro, s/p total hysterectomy (2016) presenting to the ED for constipation and abd pain. Pt states last well Pt is a 67 yo F w PMHx HTN, HLD, CAD s/p stents on ASA and Plavix, L breast CA s/p lumpectomy on chemo last 7/27 and Neulasta Onpro, s/p total hysterectomy (2016) presenting to the ED for constipation and abd pain. Pt states last well-formed BM was 3 days ago, had 2 episodes of watery stools in the ED bay here. Per pt and daughter at bedside, no bloody or dark stools. Pt states abd pain started earlier this morning, sharp, mostly in lower abdomen, radiates to chest. Pt also endorses nausea, no vomiting, endorses some diaphoresis, lightheadedness, SOB in context of trying to have a BM. Denies headache, isolated chest pain, cough, urinary changes.  Pt has a Hx of neutropenic fever, is currently on Neulasta Onpro which she gets after chemo sessions, and recently has been prescribed Percocet for pain after receiving Neulasta, has been taking percocet 325-5 q6h around the clock since Monday 7/31, last took at around 6am today. Pt is a 69 yo F w PMHx HTN, HLD, CAD s/p stents on ASA and Plavix, L breast CA s/p lumpectomy on chemo last 7/27 and Neulasta Onpro, s/p total hysterectomy (2016) presenting to the ED for constipation and abd pain. Pt states last well-formed BM was 3 days ago, had 2 episodes of watery stools in the ED bay here. Per pt and daughter at bedside, no bloody or dark stools. Pt states abd pain started earlier this morning, sharp, mostly in lower abdomen, radiates to chest, slightly alleviated w having BM's. Pt also endorses nausea, no vomiting, endorses some diaphoresis, lightheadedness, SOB in context of trying to have a BM. Denies headache, isolated chest pain, cough, urinary changes.  Pt has a Hx of neutropenic fever, is currently on Neulasta Onpro which she gets after chemo sessions, and recently has been prescribed Percocet for pain after receiving Neulasta, has been taking percocet 325-5 q6h around the clock since Monday 7/31, last took at around 6am today.

## 2023-08-03 NOTE — ED PROVIDER NOTE - NSFOLLOWUPINSTRUCTIONS_ED_ALL_ED_FT
You were seen in the ED for abdominal pain and constipation that then turned into diarrhea. You received blood work that did not show abnormalities outside of your baseline. You received a CT scan that did not show any evidence of acute disease in your abdomen.    Please follow-up with your primary medical doctor after this ED visit.    Please follow-up with your oncologist after this ED visit.    Many things can cause abdominal pain. Many times, abdominal pain is not caused by a disease and will improve without treatment. Your health care provider will do a physical exam to determine if there is a dangerous cause of your pain; blood tests and imaging may help determine the cause of your pain. However, in many cases, no cause may be found and you may need further testing as an outpatient. Monitor your abdominal pain for any changes.     SEEK IMMEDIATE MEDICAL CARE IF YOU HAVE ANY OF THE FOLLOWING SYMPTOMS: worsening abdominal pain, uncontrollable vomiting, profuse diarrhea, inability to have bowel movements or pass gas, black or bloody stools, fever accompanying chest pain or back pain, or fainting. These symptoms may represent a serious problem that is an emergency. Do not wait to see if the symptoms will go away. Get medical help right away. Call 911 and do not drive yourself to the hospital.

## 2023-08-03 NOTE — ED PROVIDER NOTE - PHYSICAL EXAMINATION
HEENT: NC/AT. Sclera clear, no conjunctival pallor. EOMI, PERRLA. No nasal discharge. Throat: no erythema, no exudates on tonsils, uvula midline.  Cardiac: RRR, +S1/S2. No murmurs, rubs, or gallops.   Chest: CTAB. No rales, rhonchi, or wheezing.   Abdomen: Soft, diffusely TTP mostly in lower quadrants, nondistended. No guarding, no rebound tenderness.  Neuro: Alert and oriented x3   Extremities: Mild edema in LE b/l. Good peripheral pulses bilaterally.

## 2023-08-03 NOTE — ED PROVIDER NOTE - CLINICAL SUMMARY MEDICAL DECISION MAKING FREE TEXT BOX
Jeyson, PGY1 Anesthesiology:  Pt is a 67 yo F w PMHx HTN, HLD, CAD s/p stents on ASA and Plavix, L breast CA s/p lumpectomy on chemo last 7/27 and Neulasta Onpro, s/p total hysterectomy (2016) presenting to the ED for constipation x 3 days now having watery diarrhea and abd pain x 1 day. Pt has been taking Percocet for pain the past 4 days around the clock. Vitals slightly hypertensive (didn't take BP meds today) and tachy but afebrile. On exam pt diffusely TTP in abdomen, mostly in lower quadrants. Pt given IV fluids, zofran, ofirmev 1g. Labs showing WBC 20k (around baseline, pt on neulasta) and lactate 2.8. CTAP ordered. Ordered c diff pcr as pt has been having watery diarrhea and recent admission in July for neutropenic fever during which was given empiric cefepime. Jeyson, PGY1 Anesthesiology:  Pt is a 69 yo F w PMHx HTN, HLD, CAD s/p stents on ASA and Plavix, L breast CA s/p lumpectomy on chemo last 7/27 and Neulasta Onpro, s/p total hysterectomy (2016) presenting to the ED for constipation x 3 days now having watery diarrhea and abd pain x 1 day. Pt has been taking Percocet for pain the past 4 days around the clock. Vitals slightly hypertensive (didn't take BP meds today) and tachy but afebrile. On exam pt diffusely TTP in abdomen, mostly in lower quadrants. DDx includes bowel obstruction, possibly in setting of metastases, constipation due to opioid use. Pt given IV fluids, zofran, ofirmev 1g. Labs showing WBC 20k (around baseline, pt on neulasta) and lactate 2.8. CTAP ordered. Pt's symptoms mildly improved s/p meds and fluids. Ordered c diff pcr as pt has been having watery diarrhea and recent admission in July for neutropenic fever during which was given empiric cefepime. CT AP neg for acute pathology, showed evidence of diarrheal illness. Pt able to tolerate PO. Repeat lactate 1.8 wnl. Discussed w pt and daughter at bedside, amenable to plan.

## 2023-08-03 NOTE — ED ADULT TRIAGE NOTE - CHIEF COMPLAINT QUOTE
Pt c/o constipation x4 days. +flatulents. Pt started on percocet Monday after receiving Neulasta On pro 2/2 Breast CA. Last chemo treatment 7/27. Pt states she took over the counter Doculax with no relief. Pt appears uncomfortable. Pt c/o constipation x4 days. +flatulents. Pt started on percocet Monday after receiving Neulasta On pro 2/2 Breast CA. Last chemo treatment 7/27. Pt states she took over the counter Doculax with no relief. Pt appears uncomfortable.    Of note : Pt had large BM while in triage, pt endorsing relief in pain

## 2023-08-04 ENCOUNTER — INPATIENT (INPATIENT)
Facility: HOSPITAL | Age: 68
LOS: 4 days | Discharge: HOME | End: 2023-08-09
Attending: STUDENT IN AN ORGANIZED HEALTH CARE EDUCATION/TRAINING PROGRAM | Admitting: STUDENT IN AN ORGANIZED HEALTH CARE EDUCATION/TRAINING PROGRAM
Payer: COMMERCIAL

## 2023-08-04 ENCOUNTER — APPOINTMENT (OUTPATIENT)
Dept: CARDIOLOGY | Facility: CLINIC | Age: 68
End: 2023-08-04
Payer: COMMERCIAL

## 2023-08-04 VITALS
RESPIRATION RATE: 18 BRPM | SYSTOLIC BLOOD PRESSURE: 151 MMHG | HEIGHT: 62 IN | HEART RATE: 99 BPM | TEMPERATURE: 99 F | DIASTOLIC BLOOD PRESSURE: 70 MMHG | OXYGEN SATURATION: 98 %

## 2023-08-04 VITALS
DIASTOLIC BLOOD PRESSURE: 71 MMHG | SYSTOLIC BLOOD PRESSURE: 118 MMHG | OXYGEN SATURATION: 99 % | HEART RATE: 78 BPM | RESPIRATION RATE: 19 BRPM

## 2023-08-04 DIAGNOSIS — Z90.710 ACQUIRED ABSENCE OF BOTH CERVIX AND UTERUS: Chronic | ICD-10-CM

## 2023-08-04 DIAGNOSIS — Z95.5 PRESENCE OF CORONARY ANGIOPLASTY IMPLANT AND GRAFT: Chronic | ICD-10-CM

## 2023-08-04 DIAGNOSIS — Z98.89 OTHER SPECIFIED POSTPROCEDURAL STATES: Chronic | ICD-10-CM

## 2023-08-04 LAB
ALBUMIN SERPL ELPH-MCNC: 3.6 G/DL — SIGNIFICANT CHANGE UP (ref 3.3–5)
ALP SERPL-CCNC: 129 U/L — HIGH (ref 40–120)
ALT FLD-CCNC: 20 U/L — SIGNIFICANT CHANGE UP (ref 4–33)
ANION GAP SERPL CALC-SCNC: 11 MMOL/L — SIGNIFICANT CHANGE UP (ref 7–14)
AST SERPL-CCNC: 31 U/L — SIGNIFICANT CHANGE UP (ref 4–32)
BASE EXCESS BLDV CALC-SCNC: -1.4 MMOL/L — SIGNIFICANT CHANGE UP (ref -2–3)
BASE EXCESS BLDV CALC-SCNC: 0.9 MMOL/L — SIGNIFICANT CHANGE UP (ref -2–3)
BILIRUB SERPL-MCNC: 0.4 MG/DL — SIGNIFICANT CHANGE UP (ref 0.2–1.2)
BLOOD GAS VENOUS COMPREHENSIVE RESULT: SIGNIFICANT CHANGE UP
BLOOD GAS VENOUS COMPREHENSIVE RESULT: SIGNIFICANT CHANGE UP
BUN SERPL-MCNC: 6 MG/DL — LOW (ref 7–23)
C DIFF BY PCR RESULT: DETECTED
CALCIUM SERPL-MCNC: 8.7 MG/DL — SIGNIFICANT CHANGE UP (ref 8.4–10.5)
CHLORIDE BLDV-SCNC: 104 MMOL/L — SIGNIFICANT CHANGE UP (ref 96–108)
CHLORIDE BLDV-SCNC: 99 MMOL/L — SIGNIFICANT CHANGE UP (ref 96–108)
CHLORIDE SERPL-SCNC: 103 MMOL/L — SIGNIFICANT CHANGE UP (ref 98–107)
CO2 BLDV-SCNC: 25.7 MMOL/L — SIGNIFICANT CHANGE UP (ref 22–26)
CO2 BLDV-SCNC: 27.3 MMOL/L — HIGH (ref 22–26)
CO2 SERPL-SCNC: 24 MMOL/L — SIGNIFICANT CHANGE UP (ref 22–31)
CREAT SERPL-MCNC: 0.67 MG/DL — SIGNIFICANT CHANGE UP (ref 0.5–1.3)
EGFR: 95 ML/MIN/1.73M2 — SIGNIFICANT CHANGE UP
GAS PNL BLDV: 132 MMOL/L — LOW (ref 136–145)
GAS PNL BLDV: 137 MMOL/L — SIGNIFICANT CHANGE UP (ref 136–145)
GLUCOSE BLDV-MCNC: 114 MG/DL — HIGH (ref 70–99)
GLUCOSE BLDV-MCNC: 145 MG/DL — HIGH (ref 70–99)
GLUCOSE SERPL-MCNC: 115 MG/DL — HIGH (ref 70–99)
HCO3 BLDV-SCNC: 24 MMOL/L — SIGNIFICANT CHANGE UP (ref 22–29)
HCO3 BLDV-SCNC: 26 MMOL/L — SIGNIFICANT CHANGE UP (ref 22–29)
HCT VFR BLD CALC: 27.5 % — LOW (ref 34.5–45)
HCT VFR BLDA CALC: 27 % — LOW (ref 34.5–46.5)
HCT VFR BLDA CALC: 28 % — LOW (ref 34.5–46.5)
HGB BLD CALC-MCNC: 8.9 G/DL — LOW (ref 11.7–16.1)
HGB BLD CALC-MCNC: 9.4 G/DL — LOW (ref 11.7–16.1)
HGB BLD-MCNC: 9 G/DL — LOW (ref 11.5–15.5)
IANC: 17.56 K/UL — HIGH (ref 1.8–7.4)
LACTATE BLDV-MCNC: 1.1 MMOL/L — SIGNIFICANT CHANGE UP (ref 0.5–2)
LACTATE BLDV-MCNC: 1.8 MMOL/L — SIGNIFICANT CHANGE UP (ref 0.5–2)
LIDOCAIN IGE QN: 24 U/L — SIGNIFICANT CHANGE UP (ref 7–60)
MCHC RBC-ENTMCNC: 29.7 PG — SIGNIFICANT CHANGE UP (ref 27–34)
MCHC RBC-ENTMCNC: 32.7 GM/DL — SIGNIFICANT CHANGE UP (ref 32–36)
MCV RBC AUTO: 90.8 FL — SIGNIFICANT CHANGE UP (ref 80–100)
PCO2 BLDV: 43 MMHG — SIGNIFICANT CHANGE UP (ref 39–52)
PCO2 BLDV: 44 MMHG — SIGNIFICANT CHANGE UP (ref 39–52)
PH BLDV: 7.35 — SIGNIFICANT CHANGE UP (ref 7.32–7.43)
PH BLDV: 7.39 — SIGNIFICANT CHANGE UP (ref 7.32–7.43)
PLATELET # BLD AUTO: 162 K/UL — SIGNIFICANT CHANGE UP (ref 150–400)
PO2 BLDV: 33 MMHG — SIGNIFICANT CHANGE UP (ref 25–45)
PO2 BLDV: 40 MMHG — SIGNIFICANT CHANGE UP (ref 25–45)
POTASSIUM BLDV-SCNC: 3.6 MMOL/L — SIGNIFICANT CHANGE UP (ref 3.5–5.1)
POTASSIUM BLDV-SCNC: 4.3 MMOL/L — SIGNIFICANT CHANGE UP (ref 3.5–5.1)
POTASSIUM SERPL-MCNC: 4.1 MMOL/L — SIGNIFICANT CHANGE UP (ref 3.5–5.3)
POTASSIUM SERPL-SCNC: 4.1 MMOL/L — SIGNIFICANT CHANGE UP (ref 3.5–5.3)
PROT SERPL-MCNC: 6.6 G/DL — SIGNIFICANT CHANGE UP (ref 6–8.3)
RBC # BLD: 3.03 M/UL — LOW (ref 3.8–5.2)
RBC # FLD: 14.4 % — SIGNIFICANT CHANGE UP (ref 10.3–14.5)
SAO2 % BLDV: 54.4 % — LOW (ref 67–88)
SAO2 % BLDV: 63.2 % — LOW (ref 67–88)
SODIUM SERPL-SCNC: 138 MMOL/L — SIGNIFICANT CHANGE UP (ref 135–145)
WBC # BLD: 25.39 K/UL — HIGH (ref 3.8–10.5)
WBC # FLD AUTO: 25.39 K/UL — HIGH (ref 3.8–10.5)

## 2023-08-04 PROCEDURE — 74177 CT ABD & PELVIS W/CONTRAST: CPT | Mod: 26,MA

## 2023-08-04 PROCEDURE — 93306 TTE W/DOPPLER COMPLETE: CPT

## 2023-08-04 PROCEDURE — 99285 EMERGENCY DEPT VISIT HI MDM: CPT

## 2023-08-04 RX ORDER — VANCOMYCIN HCL 1 G
125 VIAL (EA) INTRAVENOUS
Refills: 0 | Status: DISCONTINUED | OUTPATIENT
Start: 2023-08-04 | End: 2023-08-09

## 2023-08-04 RX ADMIN — Medication 125 MILLIGRAM(S): at 21:59

## 2023-08-04 NOTE — ED PROVIDER NOTE - ATTENDING CONTRIBUTION TO CARE
I performed a history and physical exam of the patient and discussed their management with the resident/fellow/ACP/student. I have reviewed the resident/fellow/ACP/student note and agree with the documented findings and plan of care, except as noted. I have personally performed a substantive portion of the visit including all aspects of the medical decision making. My medical decision making and observations are found above. Please refer to any progress notes for updates on clinical course.     69 y/o female with pmhx of HTN, HLD, CAD s/p stents on ASA and Plavix, L breast CA s/p lumpectomy on chemo last 7/27 and Neulasta Onpro, s/p total hysterectomy (2016) presenting for positive C-diff cultures. Patient was in ER yesterday for constipation that evolved into multiple episodes of watery diarrhea with associated abdominal pain, CTAP performed that showed non-specific fluid throughout bowel, WBC was elevated to 20 with bands but patient was dced due to improvement in symptoms with C-diff culture sent. Patient reports overall she is feeling better but continues to have abdominal cramping and diarrhea with no fevers, vomiting.     Gen: WDWN, NAD, comfortable appearing, hemodynamically stable, afebrile orally    HEENT: No nasal discharge, mucous membranes mildly dry   CV: RRR, +S1/S2, no M/R/G, equal b/l radial pulses 2+  Resp: CTAB, no W/R/R, no increased WOB   GI: Abdomen soft non-distended, mild TTP in b/l lower quadrants with no rebound/guarding, no masses/organomegaly   MSK/Skin: No CVA tenderness, no open wounds, no bruising  Neuro: A&Ox4, moving all 4 extremities spontaneously, gross sensation intact in UE and LE BL  Psych: appropriate mood    MDM:   69 y/o female with pmhx of HTN, HLD, CAD s/p stents on ASA and Plavix, L breast CA s/p lumpectomy on chemo last 7/27 and Neulasta Onpro, s/p total hysterectomy (2016) presenting for positive C-diff cultures; was in ER yesterday; CTAP performed that showed non-specific fluid throughout bowel, WBC was elevated to 20 with bands but patient was dced due to improvement in symptoms with C-diff culture sent that came back positive that patient was called back for; overall improvement in symptoms but given patients elevated WBC/bands and immunocompromised state will empirically treat for C. diff and admit for continue abx/observation

## 2023-08-04 NOTE — ED PROVIDER NOTE - PHYSICAL EXAMINATION
HEENT: NC/AT. Sclera clear, no conjunctival pallor. EOMI, PERRLA. No nasal discharge. Throat: no erythema, no exudates on tonsils, uvula midline.  Cardiac: RRR, +S1/S2. No murmurs, rubs, or gallops.   Chest: CTAB. No rales, rhonchi, or wheezing.   Abdomen: Soft, mildly TTP in lower abdominal quadrants better than yesterday, nondistended. No guarding, no rebound tenderness.  Neuro: Alert and oriented x3.  Extremities: No joint swelling. No edema. Good peripheral pulses bilaterally.

## 2023-08-04 NOTE — ED ADULT NURSE NOTE - OBJECTIVE STATEMENT
Received the patient in room 15 ,call back for positive C diff in stool pt c/o generalized weakness, hx of left breast CA with lumpectomy, on chemotherapy, CAD. patient alert and oriented x 4, safety maintained. denies any pain, not in acute distress. Unable to put IV line , Asked providers for USIV. Patient has left arm restriction in place . partial labs sent. Due meds given as per order. Will continue to monitor.

## 2023-08-04 NOTE — ED PROVIDER NOTE - NS ED ROS FT
General: denies fever, chills, sweats  HEENT: denies headache, dizziness, changes in vision/blurriness, double vision, eye pain/discharge, changes in hearing, ear pain/discharge  Cardio: denies chest pain, palpitations, leg pain/swelling  Resp: denies SOB, cough  GI: see HPI  : denies dysuria, frequency, hematuria

## 2023-08-04 NOTE — ED PROVIDER NOTE - CLINICAL SUMMARY MEDICAL DECISION MAKING FREE TEXT BOX
Jeyson, PGY1 Anesthesiology:  Pt is a 67 yo F w PMHx HTN, HLD, CAD s/p stents on ASA and Plavix, L breast CA s/p lumpectomy on chemo last 7/27 and Neulasta Onpro, s/p total hysterectomy (2016) presenting to the ED for positive C diff result after being in the ED yesterday for abd pain, constipation that turned into watery diarrhea. Vitals wnl. On exam     . Pt was in the ED last night for constipation that turned into watery diarrhea and abdominal pain, received CTAP which showed no acute path, just evidence of diarrheal illness. Pt states abdominal pain and nausea are better, still having watery diarrhea, possibly bloody stool/darker stool pt is unsure, states overall she is feeling better.  Pt denies headache, dizziness, chest pain, SOB, fever, chills. Jeyson, PGY1 Anesthesiology:  Pt is a 69 yo F w PMHx HTN, HLD, CAD s/p stents on ASA and Plavix, L breast CA s/p lumpectomy on chemo last 7/27 and Neulasta Onpro, s/p total hysterectomy (2016) presenting to the ED for positive C diff result after being in the ED yesterday for abd pain, constipation that turned into watery diarrhea, CTAP was done that showed no acute path, just evidence of diarrheal illness. Vitals wnl. On exam slightly TTP in lower abd. Pt started on vanco 250mg PO qid, admitted to medicine. Jeyson, PGY1 Anesthesiology:  Pt is a 67 yo F w PMHx HTN, HLD, CAD s/p stents on ASA and Plavix, L breast CA s/p lumpectomy on chemo last 7/27 and Neulasta Onpro, s/p total hysterectomy (2016) presenting to the ED for positive C diff result after being in the ED yesterday for abd pain, constipation that turned into watery diarrhea, CTAP was done that showed no acute path, just evidence of diarrheal illness. Vitals wnl. On exam slightly TTP in lower abd. Pt started on vanco 250mg PO qid, admitted to medicine.    Oleksandr, Attending  See Attestation

## 2023-08-04 NOTE — ED PROVIDER NOTE - OBJECTIVE STATEMENT
Pt is a 69 yo F w PMHx HTN, HLD, CAD s/p stents on ASA and Plavix, L breast CA s/p lumpectomy on chemo last 7/27 and Neulasta Onpro, s/p total hysterectomy (2016) presenting to the ED for positive C diff result. Pt was in the ED last night for constipation that turned into watery diarrhea and abdominal pain, received CTAP which showed no acute path, just evidence of diarrheal illness. Pt states abdominal pain and nausea are better, still having watery diarrhea, possibly bloody stool/darker stool pt is unsure, states overall she is feeling better.  Pt denies headache, dizziness, chest pain, SOB, fever, chills.

## 2023-08-04 NOTE — ED ADULT TRIAGE NOTE - NS ED TRIAGE AVPU SCALE
Left a message with patient in regards to crohns disease screening. Called to see if he just needs to get a colonoscopy instead of coming to see us.   Alert-The patient is alert, awake and responds to voice. The patient is oriented to time, place, and person. The triage nurse is able to obtain subjective information.

## 2023-08-04 NOTE — ED ADULT NURSE NOTE - CAS EDP DISCH DISPOSITION ADMI
Today, your Healthcare Provider may have discussed the following recommendations:    1. Exercise and Physical Activity  According to the American Heart Association, it is recommended to engage in physical activity regularly and to aim for 150 minutes of moderate-intensity aerobic activity per week.  Your Healthcare Provider may have recommended taking the stairs instead of the elevator, starting or maintaining a walking program or strength-training program.    2. Emotional Well-being  Mental and emotional well-being is essential to overall health.  Your Healthcare Provider may have encouraged you to build strong, positive relationships with family and friends, become more involved in your community (by volunteering or joining a spiritual community), or focus on self-care.    3. Fall and Injury Prevention  To prevent falls and injuries and also improve your balance, your Healthcare Provider may have suggested that you use a cane or walker, start an exercise of physical therapy program, or have your vision and/or hearing tested.    4. Urinary Leakage (Urinary Incontinence)  To control or manage the leakage of urine, your Healthcare Provider may have recommended you start bladder training exercises (such as Kegel exercises), a trial of a medication or a referral to see a specialist to discuss surgical options.    5. Chronic Conditions  Your chronic conditions and any pertinent labs associated with those conditions were reviewed.    6. Medication List  Your medication list was reviewed with the medical assistant. If there were any changes, this was also discussed with your provider.    7. Health Maintenance and Preventive Care  If you had any overdue preventive care topics, these were reviewed and ordered for you.    [facility]

## 2023-08-04 NOTE — ED ADULT NURSE REASSESSMENT NOTE - NS ED NURSE REASSESS COMMENT FT1
Break Coverage RN: Pt A&Ox4, respirations equal and unlabored. Pt resting in stretcher at this time, offers no complaints. Pending CT scan. No acute distress noted. Safety maintained, bed in lowest position, side rails raised, call bell in reach.

## 2023-08-04 NOTE — ED POST DISCHARGE NOTE - ADDITIONAL DOCUMENTATION
lab called about +c.diff. will call pt back lab called about +c.diff.  called pt and informed pt to return for further evaluation. discussed with Dr. Xie about c.diff with bandemia and wbc of 20 who agreed with the plan.

## 2023-08-04 NOTE — ED ADULT TRIAGE NOTE - CHIEF COMPLAINT QUOTE
call back for positive C dif in stool pt c/o generalized weakness, hx of left breast CA with lumpectomy, on chemotherapy, CAD

## 2023-08-05 DIAGNOSIS — I10 ESSENTIAL (PRIMARY) HYPERTENSION: ICD-10-CM

## 2023-08-05 DIAGNOSIS — E78.5 HYPERLIPIDEMIA, UNSPECIFIED: ICD-10-CM

## 2023-08-05 DIAGNOSIS — C50.919 MALIGNANT NEOPLASM OF UNSPECIFIED SITE OF UNSPECIFIED FEMALE BREAST: ICD-10-CM

## 2023-08-05 DIAGNOSIS — Z29.9 ENCOUNTER FOR PROPHYLACTIC MEASURES, UNSPECIFIED: ICD-10-CM

## 2023-08-05 DIAGNOSIS — R79.89 OTHER SPECIFIED ABNORMAL FINDINGS OF BLOOD CHEMISTRY: ICD-10-CM

## 2023-08-05 DIAGNOSIS — A04.72 ENTEROCOLITIS DUE TO CLOSTRIDIUM DIFFICILE, NOT SPECIFIED AS RECURRENT: ICD-10-CM

## 2023-08-05 DIAGNOSIS — R19.7 DIARRHEA, UNSPECIFIED: ICD-10-CM

## 2023-08-05 DIAGNOSIS — I25.10 ATHEROSCLEROTIC HEART DISEASE OF NATIVE CORONARY ARTERY WITHOUT ANGINA PECTORIS: ICD-10-CM

## 2023-08-05 LAB
BASOPHILS # BLD AUTO: 0.03 K/UL — SIGNIFICANT CHANGE UP (ref 0–0.2)
BASOPHILS NFR BLD AUTO: 0.1 % — SIGNIFICANT CHANGE UP (ref 0–2)
EOSINOPHIL # BLD AUTO: 0.04 K/UL — SIGNIFICANT CHANGE UP (ref 0–0.5)
EOSINOPHIL NFR BLD AUTO: 0.2 % — SIGNIFICANT CHANGE UP (ref 0–6)
IMM GRANULOCYTES NFR BLD AUTO: 16.1 % — HIGH (ref 0–0.9)
LYMPHOCYTES # BLD AUTO: 1.69 K/UL — SIGNIFICANT CHANGE UP (ref 1–3.3)
LYMPHOCYTES # BLD AUTO: 6.7 % — LOW (ref 13–44)
MONOCYTES # BLD AUTO: 1.99 K/UL — HIGH (ref 0–0.9)
MONOCYTES NFR BLD AUTO: 7.8 % — SIGNIFICANT CHANGE UP (ref 2–14)
NEUTROPHILS # BLD AUTO: 17.56 K/UL — HIGH (ref 1.8–7.4)
NEUTROPHILS NFR BLD AUTO: 69.1 % — SIGNIFICANT CHANGE UP (ref 43–77)
NRBC # BLD: 1 /100 WBCS — HIGH (ref 0–0)
NRBC # FLD: 0.25 K/UL — HIGH (ref 0–0)

## 2023-08-05 PROCEDURE — 99223 1ST HOSP IP/OBS HIGH 75: CPT

## 2023-08-05 RX ORDER — ATORVASTATIN CALCIUM 80 MG/1
80 TABLET, FILM COATED ORAL AT BEDTIME
Refills: 0 | Status: DISCONTINUED | OUTPATIENT
Start: 2023-08-05 | End: 2023-08-09

## 2023-08-05 RX ORDER — ENOXAPARIN SODIUM 100 MG/ML
40 INJECTION SUBCUTANEOUS EVERY 24 HOURS
Refills: 0 | Status: DISCONTINUED | OUTPATIENT
Start: 2023-08-05 | End: 2023-08-07

## 2023-08-05 RX ORDER — ATORVASTATIN CALCIUM 80 MG/1
1 TABLET, FILM COATED ORAL
Qty: 0 | Refills: 0 | DISCHARGE

## 2023-08-05 RX ORDER — SODIUM CHLORIDE 9 MG/ML
1000 INJECTION INTRAMUSCULAR; INTRAVENOUS; SUBCUTANEOUS ONCE
Refills: 0 | Status: COMPLETED | OUTPATIENT
Start: 2023-08-05 | End: 2023-08-05

## 2023-08-05 RX ORDER — LANOLIN ALCOHOL/MO/W.PET/CERES
3 CREAM (GRAM) TOPICAL AT BEDTIME
Refills: 0 | Status: DISCONTINUED | OUTPATIENT
Start: 2023-08-05 | End: 2023-08-09

## 2023-08-05 RX ORDER — ASPIRIN/CALCIUM CARB/MAGNESIUM 324 MG
81 TABLET ORAL DAILY
Refills: 0 | Status: DISCONTINUED | OUTPATIENT
Start: 2023-08-05 | End: 2023-08-09

## 2023-08-05 RX ORDER — ACETAMINOPHEN 500 MG
650 TABLET ORAL EVERY 6 HOURS
Refills: 0 | Status: DISCONTINUED | OUTPATIENT
Start: 2023-08-05 | End: 2023-08-09

## 2023-08-05 RX ORDER — ONDANSETRON 8 MG/1
4 TABLET, FILM COATED ORAL EVERY 8 HOURS
Refills: 0 | Status: DISCONTINUED | OUTPATIENT
Start: 2023-08-05 | End: 2023-08-09

## 2023-08-05 RX ORDER — CLOPIDOGREL BISULFATE 75 MG/1
75 TABLET, FILM COATED ORAL DAILY
Refills: 0 | Status: DISCONTINUED | OUTPATIENT
Start: 2023-08-05 | End: 2023-08-09

## 2023-08-05 RX ADMIN — SODIUM CHLORIDE 1000 MILLILITER(S): 9 INJECTION INTRAMUSCULAR; INTRAVENOUS; SUBCUTANEOUS at 12:28

## 2023-08-05 RX ADMIN — ENOXAPARIN SODIUM 40 MILLIGRAM(S): 100 INJECTION SUBCUTANEOUS at 23:06

## 2023-08-05 RX ADMIN — ATORVASTATIN CALCIUM 80 MILLIGRAM(S): 80 TABLET, FILM COATED ORAL at 23:05

## 2023-08-05 RX ADMIN — Medication 125 MILLIGRAM(S): at 23:07

## 2023-08-05 RX ADMIN — Medication 125 MILLIGRAM(S): at 04:33

## 2023-08-05 RX ADMIN — Medication 125 MILLIGRAM(S): at 12:29

## 2023-08-05 RX ADMIN — CLOPIDOGREL BISULFATE 75 MILLIGRAM(S): 75 TABLET, FILM COATED ORAL at 12:28

## 2023-08-05 RX ADMIN — Medication 125 MILLIGRAM(S): at 17:58

## 2023-08-05 NOTE — H&P ADULT - NSHPPHYSICALEXAM_GEN_ALL_CORE
LOS:     VITALS:   T(C): 36.7 (08-05-23 @ 07:10), Max: 37.3 (08-04-23 @ 17:54)  HR: 64 (08-05-23 @ 07:10) (64 - 99)  BP: 96/68 (08-05-23 @ 07:10) (96/68 - 151/70)  RR: 17 (08-05-23 @ 07:10) (16 - 18)  SpO2: 100% (08-05-23 @ 07:10) (98% - 100%)    GENERAL: NAD, lying in bed comfortably  HEAD:  Atraumatic, Normocephalic  EYES: EOMI, PERRLA, conjunctiva and sclera clear  ENT: Moist mucous membranes  NECK: Supple, No JVD  CHEST/LUNG: Clear to auscultation bilaterally; No rales, rhonchi, wheezing, or rubs. Unlabored respirations  HEART: Regular rate and rhythm; No murmurs, rubs, or gallops  ABDOMEN: BSx4; Soft, nontender, nondistended  EXTREMITIES:  2+ Peripheral Pulses, brisk capillary refill. No clubbing, cyanosis, or edema  NERVOUS SYSTEM:  A&Ox3, no focal deficits   SKIN: No rashes or lesions

## 2023-08-05 NOTE — H&P ADULT - HISTORY OF PRESENT ILLNESS
69 yo F w PMHx HTN, HLD, CAD s/p stents on ASA and Plavix, L breast CA s/p lumpectomy on chemo last 7/27 and Neulasta Onpro, s/p total hysterectomy (2016) presenting to the ED for positive C diff result. She initially presented to [facility] ED on 8/3 complaining of abdominal pain and constipation. She was discharged from the ED the morning of 8/4, but was then called back later on 8/4 after her stool studies were positive for C. difficile. In this time she was experiencing >10 episodes of  watery diarrhea and nausea. She denies any syncopal episodes and has never had any episodes of like this prior. In the ED, her labs were significant for leukocytosis, an elevated lactate, and a CT A/P revealed  fluid in the GI tract, consistent of diarrheal illness. She is now admitted for further management of her C. difficile infection    Of note, this patient was admitted to [facility] 7/23 for neutropenic fever.

## 2023-08-05 NOTE — PATIENT PROFILE ADULT - STATED REASON FOR ADMISSION
very bad pain from lumpectomy. had no bowel movements for 4 days. had extreme pain in abdomen. had lots of diarrhea.

## 2023-08-05 NOTE — H&P ADULT - ATTENDING COMMENTS
Seen and examined by me this afternoon, daughter at bedside.    68 year old female with PMHx of breast cancer s/p lumpectomy, on active chemo (last on 7/27) and neulasta (last dose 7/28), HTN, HLD, CAD s/p 4 stents (on DAPT) who's admitted for Clostridium Difficile Infection. Seen in ER 2 days ago for abdominal pain/constipation with subsequent diarrhea after taking dulcolax and test results from ER were positive for C.diff so patient was called to come back. 2 loose BM's so far today. Abd is soft.    Already started on PO vanco, to be continued  Monitor BM's consistency and frequency  BP soft today so giving IVF's and hold HTN meds for now  Leukocytosis likely multifactorial from neulasta plus CDI, trend WBC  C/w CAD meds  Anemia at baseline, likely of chronic disease  BMI of 33.2 --> Obesity  Encourage ambulation, anticipate DC home in 2-3 days on PO vanco  Discussed with patient/daughter at bedside at length

## 2023-08-05 NOTE — H&P ADULT - ASSESSMENT
68 year old female with pmhx of breast cancer s/p lumpectomy, on active chemo and neulasta, HTN, HLD, CAD s/p 4 stents (on DAPT) comes to the ED complaining of abdominal pain and watery diarrhea in setting of C difficile infection.

## 2023-08-05 NOTE — H&P ADULT - NSHPLABSRESULTS_GEN_ALL_CORE
LABS: When present labs, imaging, and ECG were personally reviewed                          9.0    25.39 )-----------( 162      ( 04 Aug 2023 23:10 )             27.5       08-04    138  |  103  |  6<L>  ----------------------------<  115<H>  4.1   |  24  |  0.67    Ca    8.7      04 Aug 2023 21:42    TPro  6.6  /  Alb  3.6  /  TBili  0.4  /  DBili  x   /  AST  31  /  ALT  20  /  AlkPhos  129<H>  08-04       LIVER FUNCTIONS - ( 04 Aug 2023 21:42 )  Alb: 3.6 g/dL / Pro: 6.6 g/dL / ALK PHOS: 129 U/L / ALT: 20 U/L / AST: 31 U/L / GGT: x                    Urinalysis Basic - ( 04 Aug 2023 21:42 )    Color: x / Appearance: x / SG: x / pH: x  Gluc: 115 mg/dL / Ketone: x  / Bili: x / Urobili: x   Blood: x / Protein: x / Nitrite: x   Leuk Esterase: x / RBC: x / WBC x   Sq Epi: x / Non Sq Epi: x / Bacteria: x            Lactate Trend            CAPILLARY BLOOD GLUCOSE            Culture Results:   No growth (07-11 @ 17:15)  Culture Results:   No growth at 5 days (07-11 @ 09:33)  Culture Results:     PROCEDURE:  CT of the Abdomen and Pelvis was performed.  Sagittal and coronal reformats were performed.    FINDINGS:  LOWER CHEST: Coronary artery calcifications. Postsurgical changes of the   left breast.    LIVER: Within normal limits.  BILE DUCTS: Normal caliber.  GALLBLADDER: Within normal limits.  SPLEEN: Within normal limits.  PANCREAS: Within normal limits.  ADRENALS: Within normal limits.  KIDNEYS/URETERS: Left renal cyst. No hydronephrosis.    BLADDER: Minimally distended.  REPRODUCTIVE ORGANS: Hysterectomy.    BOWEL: No bowel obstruction. Appendixis normal. Fluid throughout the   bowel with no formed stool.  PERITONEUM: No ascites.  VESSELS: Atherosclerotic changes.  RETROPERITONEUM/LYMPH NODES: No lymphadenopathy.  ABDOMINAL WALL: Within normal limits.  BONES: Degenerative changes.    IMPRESSION:  Fluid throughout the bowel with no formed stool suggestive of diarrheal   illness. Otherwise no acute findings.      No growth at 5 days (07-11 @ 09:20)      RADIOLOGY & ADDITIONAL TESTS:

## 2023-08-05 NOTE — H&P ADULT - NSHPREVIEWOFSYSTEMS_GEN_ALL_CORE
Review of Systems:  Constitutional: No fever, No weight loss, poor appetite  Head: No headache   Eyes: No blurry vision, No diplopia  Neuro: No tremors, No muscle weakness   Cardiovascular: No chest pain, No palpitations  Respiratory: No SOB, No cough  GI: + nausea and diarrhea, No vomiting  : No dysuria, No hematuria  Skin: No rash  MSK: No joint pain   Psych: No depression  Heme: No abnormal bruising, no abnormal bleeding

## 2023-08-05 NOTE — H&P ADULT - PROBLEM SELECTOR PLAN 3
-BP was 110s/50s   - likely low due to volume depletion  - cw only metoprolol succinate -BP was 110s/50s   - likely low due to volume depletion  - hold home meds for now

## 2023-08-05 NOTE — H&P ADULT - PROBLEM SELECTOR PLAN 1
->10 episodes of watery diarrhea  - Stool studies + for C dif   - elevated WBC due to infection, neulasta, and steroids   - on PO Vanc 125 mg QID ->10 episodes of watery diarrhea  - Stool studies + for C dif   - elevated WBC due to infection, neulasta, and steroids   - on PO Vanc 125 mg QID; aiming for 10 day course (8/5-  - give 1L NS bolus in setting of low BP

## 2023-08-05 NOTE — H&P ADULT - PROBLEM SELECTOR PLAN 6
-Diet: CLD  -DVT ppx: lovenox 40 QD  -Dispo: Home -Diet: DASH   -DVT ppx: lovenox 40 QD  -Dispo: Home

## 2023-08-05 NOTE — H&P ADULT - TIME BILLING
Bedside exam and interview   Reviewed vitals, labs, consultant notes  Discussed patient's plan of care with housestaff, patient and family  Documentation of encounter

## 2023-08-05 NOTE — ED ADULT NURSE REASSESSMENT NOTE - NS ED NURSE REASSESS COMMENT FT1
Break Coverage RN: Pt A&Ox4, respirations equal and unlabored. Pt resting in stretcher at this time, offers no complaints. Pending USIV by MD and plan to admit to hospital. No acute distress noted. Safety maintained, bed in lowest position, side rails raised, call bell in reach.

## 2023-08-05 NOTE — PATIENT PROFILE ADULT - FALL HARM RISK - UNIVERSAL INTERVENTIONS
Bed in lowest position, wheels locked, appropriate side rails in place/Call bell, personal items and telephone in reach/Instruct patient to call for assistance before getting out of bed or chair/Non-slip footwear when patient is out of bed/[place] to call system/Physically safe environment - no spills, clutter or unnecessary equipment/Purposeful Proactive Rounding/Room/bathroom lighting operational, light cord in reach

## 2023-08-06 LAB
ALBUMIN SERPL ELPH-MCNC: 3.4 G/DL — SIGNIFICANT CHANGE UP (ref 3.3–5)
ALP SERPL-CCNC: 104 U/L — SIGNIFICANT CHANGE UP (ref 40–120)
ALT FLD-CCNC: 17 U/L — SIGNIFICANT CHANGE UP (ref 4–33)
ANION GAP SERPL CALC-SCNC: 12 MMOL/L — SIGNIFICANT CHANGE UP (ref 7–14)
APTT BLD: 27.2 SEC — SIGNIFICANT CHANGE UP (ref 24.5–35.6)
AST SERPL-CCNC: 21 U/L — SIGNIFICANT CHANGE UP (ref 4–32)
BASOPHILS # BLD AUTO: 0.07 K/UL — SIGNIFICANT CHANGE UP (ref 0–0.2)
BASOPHILS NFR BLD AUTO: 0.5 % — SIGNIFICANT CHANGE UP (ref 0–2)
BILIRUB SERPL-MCNC: 0.3 MG/DL — SIGNIFICANT CHANGE UP (ref 0.2–1.2)
BUN SERPL-MCNC: 4 MG/DL — LOW (ref 7–23)
CALCIUM SERPL-MCNC: 8.7 MG/DL — SIGNIFICANT CHANGE UP (ref 8.4–10.5)
CHLORIDE SERPL-SCNC: 106 MMOL/L — SIGNIFICANT CHANGE UP (ref 98–107)
CHOLEST SERPL-MCNC: 118 MG/DL — SIGNIFICANT CHANGE UP
CO2 SERPL-SCNC: 21 MMOL/L — LOW (ref 22–31)
CREAT SERPL-MCNC: 0.63 MG/DL — SIGNIFICANT CHANGE UP (ref 0.5–1.3)
EGFR: 97 ML/MIN/1.73M2 — SIGNIFICANT CHANGE UP
EOSINOPHIL # BLD AUTO: 0.01 K/UL — SIGNIFICANT CHANGE UP (ref 0–0.5)
EOSINOPHIL NFR BLD AUTO: 0.1 % — SIGNIFICANT CHANGE UP (ref 0–6)
GLUCOSE SERPL-MCNC: 123 MG/DL — HIGH (ref 70–99)
HCT VFR BLD CALC: 26.6 % — LOW (ref 34.5–45)
HDLC SERPL-MCNC: 14 MG/DL — LOW
HGB BLD-MCNC: 8.6 G/DL — LOW (ref 11.5–15.5)
IANC: 10.39 K/UL — HIGH (ref 1.8–7.4)
IMM GRANULOCYTES NFR BLD AUTO: 12.5 % — HIGH (ref 0–0.9)
INR BLD: 1.01 RATIO — SIGNIFICANT CHANGE UP (ref 0.85–1.18)
LIPID PNL WITH DIRECT LDL SERPL: 55 MG/DL — SIGNIFICANT CHANGE UP
LYMPHOCYTES # BLD AUTO: 1.12 K/UL — SIGNIFICANT CHANGE UP (ref 1–3.3)
LYMPHOCYTES # BLD AUTO: 7.8 % — LOW (ref 13–44)
MAGNESIUM SERPL-MCNC: 2.3 MG/DL — SIGNIFICANT CHANGE UP (ref 1.6–2.6)
MCHC RBC-ENTMCNC: 29.9 PG — SIGNIFICANT CHANGE UP (ref 27–34)
MCHC RBC-ENTMCNC: 32.3 GM/DL — SIGNIFICANT CHANGE UP (ref 32–36)
MCV RBC AUTO: 92.4 FL — SIGNIFICANT CHANGE UP (ref 80–100)
MONOCYTES # BLD AUTO: 0.9 K/UL — SIGNIFICANT CHANGE UP (ref 0–0.9)
MONOCYTES NFR BLD AUTO: 6.3 % — SIGNIFICANT CHANGE UP (ref 2–14)
NEUTROPHILS # BLD AUTO: 10.39 K/UL — HIGH (ref 1.8–7.4)
NEUTROPHILS NFR BLD AUTO: 72.8 % — SIGNIFICANT CHANGE UP (ref 43–77)
NON HDL CHOLESTEROL: 104 MG/DL — SIGNIFICANT CHANGE UP
NRBC # BLD: 1 /100 WBCS — HIGH (ref 0–0)
NRBC # FLD: 0.16 K/UL — HIGH (ref 0–0)
PHOSPHATE SERPL-MCNC: 4.7 MG/DL — HIGH (ref 2.5–4.5)
PLATELET # BLD AUTO: 135 K/UL — LOW (ref 150–400)
POTASSIUM SERPL-MCNC: 3.3 MMOL/L — LOW (ref 3.5–5.3)
POTASSIUM SERPL-SCNC: 3.3 MMOL/L — LOW (ref 3.5–5.3)
PROT SERPL-MCNC: 6.1 G/DL — SIGNIFICANT CHANGE UP (ref 6–8.3)
PROTHROM AB SERPL-ACNC: 11.3 SEC — SIGNIFICANT CHANGE UP (ref 9.5–13)
RBC # BLD: 2.88 M/UL — LOW (ref 3.8–5.2)
RBC # FLD: 15 % — HIGH (ref 10.3–14.5)
SODIUM SERPL-SCNC: 139 MMOL/L — SIGNIFICANT CHANGE UP (ref 135–145)
TRIGL SERPL-MCNC: 246 MG/DL — HIGH
WBC # BLD: 14.27 K/UL — HIGH (ref 3.8–10.5)
WBC # FLD AUTO: 14.27 K/UL — HIGH (ref 3.8–10.5)

## 2023-08-06 PROCEDURE — 99233 SBSQ HOSP IP/OBS HIGH 50: CPT | Mod: GC

## 2023-08-06 RX ORDER — LORATADINE 10 MG/1
10 TABLET ORAL DAILY
Refills: 0 | Status: DISCONTINUED | OUTPATIENT
Start: 2023-08-06 | End: 2023-08-09

## 2023-08-06 RX ORDER — POTASSIUM CHLORIDE 20 MEQ
40 PACKET (EA) ORAL EVERY 4 HOURS
Refills: 0 | Status: COMPLETED | OUTPATIENT
Start: 2023-08-06 | End: 2023-08-06

## 2023-08-06 RX ORDER — POTASSIUM CHLORIDE 20 MEQ
40 PACKET (EA) ORAL ONCE
Refills: 0 | Status: DISCONTINUED | OUTPATIENT
Start: 2023-08-06 | End: 2023-08-06

## 2023-08-06 RX ADMIN — Medication 81 MILLIGRAM(S): at 11:51

## 2023-08-06 RX ADMIN — Medication 125 MILLIGRAM(S): at 17:54

## 2023-08-06 RX ADMIN — Medication 125 MILLIGRAM(S): at 22:37

## 2023-08-06 RX ADMIN — Medication 40 MILLIEQUIVALENT(S): at 19:13

## 2023-08-06 RX ADMIN — Medication 125 MILLIGRAM(S): at 11:58

## 2023-08-06 RX ADMIN — Medication 125 MILLIGRAM(S): at 05:04

## 2023-08-06 RX ADMIN — Medication 40 MILLIEQUIVALENT(S): at 11:58

## 2023-08-06 RX ADMIN — LORATADINE 10 MILLIGRAM(S): 10 TABLET ORAL at 11:50

## 2023-08-06 RX ADMIN — CLOPIDOGREL BISULFATE 75 MILLIGRAM(S): 75 TABLET, FILM COATED ORAL at 11:51

## 2023-08-06 RX ADMIN — ATORVASTATIN CALCIUM 80 MILLIGRAM(S): 80 TABLET, FILM COATED ORAL at 22:37

## 2023-08-06 RX ADMIN — ENOXAPARIN SODIUM 40 MILLIGRAM(S): 100 INJECTION SUBCUTANEOUS at 22:38

## 2023-08-06 NOTE — PROGRESS NOTE ADULT - ATTENDING COMMENTS
68 year old female with PMHx of breast cancer s/p lumpectomy, on active chemo (last on 7/27) and neulasta (last dose 7/28), HTN, HLD, CAD s/p 4 stents (on DAPT) who's admitted for Clostridium Difficile Infection. Seen in ER 2 days ago for abdominal pain/constipation with subsequent diarrhea after taking dulcolax and test results from ER were positive for C.diff. Patient was called to come back.    # C diff colitis-Had more than 10 episodes of watery diarrhea on admission--> now 3 watery BMs. BC-NGTD  On PO Vanc 125 mg QID, to be continued  Monitor BM's consistency and frequency  # H/o HTN- BP meds on hold as BP was in 90s on admission. Will restart HTN meds  as BP improves  # Leukocytosis likely multifactorial from neulasta plus CDI, trend WBC- Improving   # Hypokalemia- Replete  #C/w CAD meds  #Anemia at baseline, likely of chronic disease  Encourage ambulation, anticipate DC home in 2-3 days on PO vanco  Discussed with patient/daughter at bedside at length . 68 year old female with PMHx of breast cancer s/p lumpectomy, on active chemo (last on 7/27) and neulasta (last dose 7/28), HTN, HLD, CAD s/p 4 stents (on DAPT) who's admitted for Clostridium Difficile Infection. Seen in ER 2 days ago for abdominal pain/constipation with subsequent diarrhea after taking dulcolax and test results from ER were positive for C.diff. Patient was called to come back.    # C diff colitis-Had more than 10 episodes of watery diarrhea on admission--> now 3 semiformed BMs. BC-NGTD  On PO Vanc 125 mg QID, to be continued  Monitor BM's consistency and frequency  # H/o HTN- BP meds on hold as BP was in 90s on admission. Will restart HTN meds  as BP improves  # Leukocytosis likely multifactorial from neulasta plus CDI, trend WBC- Improving   # Hypokalemia- Replete  #C/w CAD meds  #Anemia at baseline, likely of chronic disease  Encourage ambulation, anticipate DC home in 1-2 days on PO vanco if symptoms continue to improve  Discussed with patient/  at bedside at length .

## 2023-08-07 LAB
ALBUMIN SERPL ELPH-MCNC: 3.6 G/DL — SIGNIFICANT CHANGE UP (ref 3.3–5)
ALBUMIN SERPL ELPH-MCNC: 4.2 G/DL — SIGNIFICANT CHANGE UP (ref 3.3–5)
ALP SERPL-CCNC: 121 U/L — HIGH (ref 40–120)
ALP SERPL-CCNC: 98 U/L — SIGNIFICANT CHANGE UP (ref 40–120)
ALT FLD-CCNC: 17 U/L — SIGNIFICANT CHANGE UP (ref 4–33)
ALT FLD-CCNC: 21 U/L — SIGNIFICANT CHANGE UP (ref 4–33)
ANION GAP SERPL CALC-SCNC: 11 MMOL/L — SIGNIFICANT CHANGE UP (ref 7–14)
ANION GAP SERPL CALC-SCNC: 17 MMOL/L — HIGH (ref 7–14)
APPEARANCE UR: CLEAR — SIGNIFICANT CHANGE UP
AST SERPL-CCNC: 20 U/L — SIGNIFICANT CHANGE UP (ref 4–32)
AST SERPL-CCNC: 26 U/L — SIGNIFICANT CHANGE UP (ref 4–32)
BACTERIA # UR AUTO: NEGATIVE /HPF — SIGNIFICANT CHANGE UP
BASE EXCESS BLDV CALC-SCNC: -1.6 MMOL/L — SIGNIFICANT CHANGE UP (ref -2–3)
BASOPHILS # BLD AUTO: 0 K/UL — SIGNIFICANT CHANGE UP (ref 0–0.2)
BASOPHILS NFR BLD AUTO: 0 % — SIGNIFICANT CHANGE UP (ref 0–2)
BILIRUB SERPL-MCNC: 0.4 MG/DL — SIGNIFICANT CHANGE UP (ref 0.2–1.2)
BILIRUB SERPL-MCNC: 0.5 MG/DL — SIGNIFICANT CHANGE UP (ref 0.2–1.2)
BILIRUB UR-MCNC: NEGATIVE — SIGNIFICANT CHANGE UP
BUN SERPL-MCNC: 4 MG/DL — LOW (ref 7–23)
BUN SERPL-MCNC: 4 MG/DL — LOW (ref 7–23)
CA-I SERPL-SCNC: 1.14 MMOL/L — LOW (ref 1.15–1.33)
CALCIUM SERPL-MCNC: 8.7 MG/DL — SIGNIFICANT CHANGE UP (ref 8.4–10.5)
CALCIUM SERPL-MCNC: 9.5 MG/DL — SIGNIFICANT CHANGE UP (ref 8.4–10.5)
CAST: 0 /LPF — SIGNIFICANT CHANGE UP (ref 0–4)
CHLORIDE BLDV-SCNC: 104 MMOL/L — SIGNIFICANT CHANGE UP (ref 96–108)
CHLORIDE SERPL-SCNC: 104 MMOL/L — SIGNIFICANT CHANGE UP (ref 98–107)
CHLORIDE SERPL-SCNC: 109 MMOL/L — HIGH (ref 98–107)
CK MB BLD-MCNC: <1.9 % — SIGNIFICANT CHANGE UP (ref 0–2.5)
CK MB CFR SERPL CALC: 1.3 NG/ML — SIGNIFICANT CHANGE UP
CK MB CFR SERPL CALC: <1 NG/ML — SIGNIFICANT CHANGE UP
CK SERPL-CCNC: 52 U/L — SIGNIFICANT CHANGE UP (ref 25–170)
CO2 BLDV-SCNC: 19.9 MMOL/L — LOW (ref 22–26)
CO2 SERPL-SCNC: 20 MMOL/L — LOW (ref 22–31)
CO2 SERPL-SCNC: 22 MMOL/L — SIGNIFICANT CHANGE UP (ref 22–31)
COLOR SPEC: YELLOW — SIGNIFICANT CHANGE UP
CREAT SERPL-MCNC: 0.7 MG/DL — SIGNIFICANT CHANGE UP (ref 0.5–1.3)
CREAT SERPL-MCNC: 0.75 MG/DL — SIGNIFICANT CHANGE UP (ref 0.5–1.3)
D DIMER BLD IA.RAPID-MCNC: 227 NG/ML DDU — SIGNIFICANT CHANGE UP
DIFF PNL FLD: ABNORMAL
EGFR: 87 ML/MIN/1.73M2 — SIGNIFICANT CHANGE UP
EGFR: 94 ML/MIN/1.73M2 — SIGNIFICANT CHANGE UP
EOSINOPHIL # BLD AUTO: 0 K/UL — SIGNIFICANT CHANGE UP (ref 0–0.5)
EOSINOPHIL NFR BLD AUTO: 0 % — SIGNIFICANT CHANGE UP (ref 0–6)
GAS PNL BLDV: 136 MMOL/L — SIGNIFICANT CHANGE UP (ref 136–145)
GAS PNL BLDV: SIGNIFICANT CHANGE UP
GAS PNL BLDV: SIGNIFICANT CHANGE UP
GLUCOSE BLDC GLUCOMTR-MCNC: 117 MG/DL — HIGH (ref 70–99)
GLUCOSE BLDV-MCNC: 138 MG/DL — HIGH (ref 70–99)
GLUCOSE SERPL-MCNC: 135 MG/DL — HIGH (ref 70–99)
GLUCOSE SERPL-MCNC: 139 MG/DL — HIGH (ref 70–99)
GLUCOSE UR QL: NEGATIVE MG/DL — SIGNIFICANT CHANGE UP
HCO3 BLDV-SCNC: 19 MMOL/L — LOW (ref 22–29)
HCT VFR BLD CALC: 27 % — LOW (ref 34.5–45)
HCT VFR BLD CALC: 30.3 % — LOW (ref 34.5–45)
HCT VFR BLDA CALC: 51 % — HIGH (ref 34.5–46.5)
HGB BLD CALC-MCNC: 17.1 G/DL — HIGH (ref 11.7–16.1)
HGB BLD-MCNC: 10 G/DL — LOW (ref 11.5–15.5)
HGB BLD-MCNC: 8.7 G/DL — LOW (ref 11.5–15.5)
IANC: 7.16 K/UL — SIGNIFICANT CHANGE UP (ref 1.8–7.4)
KETONES UR-MCNC: NEGATIVE MG/DL — SIGNIFICANT CHANGE UP
LACTATE BLDV-MCNC: 3.9 MMOL/L — HIGH (ref 0.5–2)
LACTATE SERPL-SCNC: 1.4 MMOL/L — SIGNIFICANT CHANGE UP (ref 0.5–2)
LEUKOCYTE ESTERASE UR-ACNC: NEGATIVE — SIGNIFICANT CHANGE UP
LYMPHOCYTES # BLD AUTO: 0.18 K/UL — LOW (ref 1–3.3)
LYMPHOCYTES # BLD AUTO: 1.8 % — LOW (ref 13–44)
MAGNESIUM SERPL-MCNC: 2 MG/DL — SIGNIFICANT CHANGE UP (ref 1.6–2.6)
MAGNESIUM SERPL-MCNC: 2 MG/DL — SIGNIFICANT CHANGE UP (ref 1.6–2.6)
MCHC RBC-ENTMCNC: 29.8 PG — SIGNIFICANT CHANGE UP (ref 27–34)
MCHC RBC-ENTMCNC: 29.9 PG — SIGNIFICANT CHANGE UP (ref 27–34)
MCHC RBC-ENTMCNC: 32.2 GM/DL — SIGNIFICANT CHANGE UP (ref 32–36)
MCHC RBC-ENTMCNC: 33 GM/DL — SIGNIFICANT CHANGE UP (ref 32–36)
MCV RBC AUTO: 90.2 FL — SIGNIFICANT CHANGE UP (ref 80–100)
MCV RBC AUTO: 92.8 FL — SIGNIFICANT CHANGE UP (ref 80–100)
MONOCYTES # BLD AUTO: 0.09 K/UL — SIGNIFICANT CHANGE UP (ref 0–0.9)
MONOCYTES NFR BLD AUTO: 0.9 % — LOW (ref 2–14)
NEUTROPHILS # BLD AUTO: 9.6 K/UL — HIGH (ref 1.8–7.4)
NEUTROPHILS NFR BLD AUTO: 85.8 % — HIGH (ref 43–77)
NITRITE UR-MCNC: NEGATIVE — SIGNIFICANT CHANGE UP
NRBC # BLD: 1 /100 WBCS — HIGH (ref 0–0)
NRBC # FLD: 0.12 K/UL — HIGH (ref 0–0)
NT-PROBNP SERPL-SCNC: 124 PG/ML — SIGNIFICANT CHANGE UP
PCO2 BLDV: 24 MMHG — LOW (ref 39–52)
PH BLDV: 7.51 — HIGH (ref 7.32–7.43)
PH UR: 8 — SIGNIFICANT CHANGE UP (ref 5–8)
PHOSPHATE SERPL-MCNC: 4.5 MG/DL — SIGNIFICANT CHANGE UP (ref 2.5–4.5)
PLATELET # BLD AUTO: 119 K/UL — LOW (ref 150–400)
PLATELET # BLD AUTO: 128 K/UL — LOW (ref 150–400)
PO2 BLDV: 40 MMHG — SIGNIFICANT CHANGE UP (ref 25–45)
POTASSIUM BLDV-SCNC: 3.9 MMOL/L — SIGNIFICANT CHANGE UP (ref 3.5–5.1)
POTASSIUM SERPL-MCNC: 3.8 MMOL/L — SIGNIFICANT CHANGE UP (ref 3.5–5.3)
POTASSIUM SERPL-MCNC: 4 MMOL/L — SIGNIFICANT CHANGE UP (ref 3.5–5.3)
POTASSIUM SERPL-SCNC: 3.8 MMOL/L — SIGNIFICANT CHANGE UP (ref 3.5–5.3)
POTASSIUM SERPL-SCNC: 4 MMOL/L — SIGNIFICANT CHANGE UP (ref 3.5–5.3)
PROT SERPL-MCNC: 6 G/DL — SIGNIFICANT CHANGE UP (ref 6–8.3)
PROT SERPL-MCNC: 7.3 G/DL — SIGNIFICANT CHANGE UP (ref 6–8.3)
PROT UR-MCNC: NEGATIVE MG/DL — SIGNIFICANT CHANGE UP
RBC # BLD: 2.91 M/UL — LOW (ref 3.8–5.2)
RBC # BLD: 3.36 M/UL — LOW (ref 3.8–5.2)
RBC # FLD: 14.9 % — HIGH (ref 10.3–14.5)
RBC # FLD: 15 % — HIGH (ref 10.3–14.5)
RBC CASTS # UR COMP ASSIST: 2 /HPF — SIGNIFICANT CHANGE UP (ref 0–4)
SAO2 % BLDV: 72.7 % — SIGNIFICANT CHANGE UP (ref 67–88)
SODIUM SERPL-SCNC: 141 MMOL/L — SIGNIFICANT CHANGE UP (ref 135–145)
SODIUM SERPL-SCNC: 142 MMOL/L — SIGNIFICANT CHANGE UP (ref 135–145)
SP GR SPEC: 1.04 — HIGH (ref 1–1.03)
SQUAMOUS # UR AUTO: 3 /HPF — SIGNIFICANT CHANGE UP (ref 0–5)
TROPONIN T, HIGH SENSITIVITY RESULT: 13 NG/L — SIGNIFICANT CHANGE UP
TROPONIN T, HIGH SENSITIVITY RESULT: 36 NG/L — SIGNIFICANT CHANGE UP
TROPONIN T, HIGH SENSITIVITY RESULT: 59 NG/L — CRITICAL HIGH
UROBILINOGEN FLD QL: 0.2 MG/DL — SIGNIFICANT CHANGE UP (ref 0.2–1)
WBC # BLD: 10.15 K/UL — SIGNIFICANT CHANGE UP (ref 3.8–10.5)
WBC # BLD: 11.61 K/UL — HIGH (ref 3.8–10.5)
WBC # FLD AUTO: 10.15 K/UL — SIGNIFICANT CHANGE UP (ref 3.8–10.5)
WBC # FLD AUTO: 11.61 K/UL — HIGH (ref 3.8–10.5)
WBC UR QL: 0 /HPF — SIGNIFICANT CHANGE UP (ref 0–5)

## 2023-08-07 PROCEDURE — 71275 CT ANGIOGRAPHY CHEST: CPT | Mod: 26

## 2023-08-07 PROCEDURE — 99223 1ST HOSP IP/OBS HIGH 75: CPT

## 2023-08-07 PROCEDURE — 99232 SBSQ HOSP IP/OBS MODERATE 35: CPT

## 2023-08-07 PROCEDURE — 93306 TTE W/DOPPLER COMPLETE: CPT | Mod: 26

## 2023-08-07 PROCEDURE — 71045 X-RAY EXAM CHEST 1 VIEW: CPT | Mod: 26

## 2023-08-07 RX ORDER — HEPARIN SODIUM 5000 [USP'U]/ML
INJECTION INTRAVENOUS; SUBCUTANEOUS
Qty: 25000 | Refills: 0 | Status: DISCONTINUED | OUTPATIENT
Start: 2023-08-07 | End: 2023-08-08

## 2023-08-07 RX ORDER — ASPIRIN/CALCIUM CARB/MAGNESIUM 324 MG
324 TABLET ORAL ONCE
Refills: 0 | Status: DISCONTINUED | OUTPATIENT
Start: 2023-08-07 | End: 2023-08-07

## 2023-08-07 RX ORDER — NITROGLYCERIN 6.5 MG
0.4 CAPSULE, EXTENDED RELEASE ORAL
Refills: 0 | Status: DISCONTINUED | OUTPATIENT
Start: 2023-08-07 | End: 2023-08-09

## 2023-08-07 RX ORDER — TICAGRELOR 90 MG/1
180 TABLET ORAL ONCE
Refills: 0 | Status: COMPLETED | OUTPATIENT
Start: 2023-08-07 | End: 2023-08-07

## 2023-08-07 RX ORDER — HEPARIN SODIUM 5000 [USP'U]/ML
4000 INJECTION INTRAVENOUS; SUBCUTANEOUS ONCE
Refills: 0 | Status: DISCONTINUED | OUTPATIENT
Start: 2023-08-07 | End: 2023-08-08

## 2023-08-07 RX ADMIN — CLOPIDOGREL BISULFATE 75 MILLIGRAM(S): 75 TABLET, FILM COATED ORAL at 11:21

## 2023-08-07 RX ADMIN — Medication 125 MILLIGRAM(S): at 17:40

## 2023-08-07 RX ADMIN — Medication 81 MILLIGRAM(S): at 11:21

## 2023-08-07 RX ADMIN — Medication 125 MILLIGRAM(S): at 23:00

## 2023-08-07 RX ADMIN — HEPARIN SODIUM 1000 UNIT(S)/HR: 5000 INJECTION INTRAVENOUS; SUBCUTANEOUS at 17:38

## 2023-08-07 RX ADMIN — ATORVASTATIN CALCIUM 80 MILLIGRAM(S): 80 TABLET, FILM COATED ORAL at 23:00

## 2023-08-07 RX ADMIN — HEPARIN SODIUM 1000 UNIT(S)/HR: 5000 INJECTION INTRAVENOUS; SUBCUTANEOUS at 19:36

## 2023-08-07 RX ADMIN — Medication 125 MILLIGRAM(S): at 05:38

## 2023-08-07 RX ADMIN — Medication 125 MILLIGRAM(S): at 11:20

## 2023-08-07 RX ADMIN — LORATADINE 10 MILLIGRAM(S): 10 TABLET ORAL at 11:20

## 2023-08-07 NOTE — RAPID RESPONSE TEAM SUMMARY - NSSITUATIONBACKGROUNDRRT_GEN_ALL_CORE
68 year old female with pmhx of breast cancer s/p lumpectomy, on active chemo and neulasta, HTN, HLD, CAD s/p 4 stents (on DAPT) comes to the ED complaining of abdominal pain and watery diarrhea in setting of C difficile infection. RRT 68 year old female with pmhx of breast cancer s/p lumpectomy, on active chemo and neulasta, HTN, HLD, CAD s/p 4 stents (on DAPT) comes to the ED complaining of abdominal pain and watery diarrhea in setting of C difficile infection. RRT called for chest pain. Chest pain is described as a substernal chest pressure that patient has never experienced previously and occurred while walking to the bathroom. Of note, chest pain is reproducible but EKG showing new LBBB.

## 2023-08-07 NOTE — PROGRESS NOTE ADULT - ATTENDING COMMENTS
68 year old female with PMHx of breast cancer s/p lumpectomy, on active chemo (last on 7/27) and neulasta (last dose 7/28), HTN, HLD, CAD s/p 4 stents (on DAPT) who's admitted for Clostridium Difficile Infection.     RRT called this PM for dyspnea, chest pain, tachycardia. Per daughter at bedside, patient felt well, was online shopping with her daughter, walked to the bathroom. On return, patient reported she felt SOB, reported severe dyspnea, 5/10 L sided chest pain. Patient appeared ill, tachypneic, tachycardic. RRT called, see note for further details. EKG w/ LBBB, appears new. CT PE study neg. Initial Derik 13.     # Dyspnea, tachycardia: CT PE study neg for PE, PNA, PNX, dissection. Initial Derik 13, awaiting 2nd trop. Bedside TTE done, awaiting read. Appreciate Cardiology recommendations.   # C diff colitis: Cont PO Vanc 125 mg QID x 10 days   # H/o HTN- BP meds on hold.   # Leukocytosis: C diff, Neulasta, improving.   #C/w CAD meds  #Anemia at baseline, likely of chronic disease    D/w daughter at bedside. Patient re-assessed three times throughout course of day.

## 2023-08-07 NOTE — CONSULT NOTE ADULT - ATTENDING COMMENTS
pt seen and examined   labs and vitals reviewed  agree with above assessment and plan  pt with chest pressure that is reproducible, uncomfortable appearing at present  ?new tachycardiac and tachypnea noted. not hypoxic but now on NC  EKG with LBBB ?new but also may be rate related  CE sent from rapid response, not consistent with acs  pt also sent for CTA, with no evidence of PE  cont supportive care and w/u of alternative causes of chest pain per primary team  if no evidence of PE and CE not consistent with acs, would not cont heparin drip to treat cp given advanced malignancy

## 2023-08-07 NOTE — CONSULT NOTE ADULT - SUBJECTIVE AND OBJECTIVE BOX
Reason for consult: breast cancer    HPI:   69 yo F w PMHx HTN, HLD, CAD s/p stents on ASA and Plavix, L breast CA s/p lumpectomy on chemo last 7/27 and Neulasta Onpro, s/p total hysterectomy (2016) presenting to the ED for positive C diff result. She initially presented to [facility] ED on 8/3 complaining of abdominal pain and constipation. She was discharged from the ED the morning of 8/4, but was then called back later on 8/4 after her stool studies were positive for C. difficile. In this time she was experiencing >10 episodes of  watery diarrhea and nausea. She denies any syncopal episodes and has never had any episodes of like this prior. In the ED, her labs were significant for leukocytosis, an elevated lactate, and a CT A/P revealed  fluid in the GI tract, consistent of diarrheal illness. She is now admitted for further management of her C. difficile infection    Of note, this patient was admitted to [facility] 7/23 for neutropenic fever.  (05 Aug 2023 09:20)      Hematology/Oncology consulted on this 68 year old female with left breast cancer who presents with C diff infection. Pt follows with Dr. Kathy Hernandez at [facility] [place].    PAST MEDICAL & SURGICAL HISTORY:  Hyperlipemia      Hypertension      Arthritis      Breast cancer  L, treated with lumpectomy in 2011      CAD (coronary artery disease)      History of D&C      S/P breast lumpectomy  L, 2011      S/P total hysterectomy  2016      S/P primary angioplasty with coronary stent  2014 x 2          FAMILY HISTORY:  FH: HTN (hypertension)    FH: CAD (coronary artery disease)        Alochol: Denied  Smoking: Nonsmoker  Drug Use: Denied  Marital Status:         Allergies    Dilaudid (Other)  morphine (Pruritus)  adhesives (Rash)    Intolerances        MEDICATIONS  (STANDING):  aspirin  chewable 81 milliGRAM(s) Oral daily  atorvastatin 80 milliGRAM(s) Oral at bedtime  clopidogrel Tablet 75 milliGRAM(s) Oral daily  enoxaparin Injectable 40 milliGRAM(s) SubCutaneous every 24 hours  vancomycin    Solution 125 milliGRAM(s) Oral four times a day    MEDICATIONS  (PRN):  acetaminophen     Tablet .. 650 milliGRAM(s) Oral every 6 hours PRN Temp greater or equal to 38C (100.4F), Mild Pain (1 - 3)  aluminum hydroxide/magnesium hydroxide/simethicone Suspension 30 milliLiter(s) Oral every 4 hours PRN Dyspepsia  melatonin 3 milliGRAM(s) Oral at bedtime PRN Insomnia  ondansetron Injectable 4 milliGRAM(s) IV Push every 8 hours PRN Nausea and/or Vomiting      ROS  No fever, sweats, chills  No epistaxis, HA, sore throat  No CP, SOB, cough, sputum  + diarrhea, abd pain  No edema  No rash  No anxiety  No back pain, joint pain  No bleeding, bruising  No dysuria, hematuria    T(C): 36.9 (08-05-23 @ 13:17), Max: 37.3 (08-04-23 @ 17:54)  HR: 71 (08-05-23 @ 13:17) (64 - 99)  BP: 111/63 (08-05-23 @ 13:17) (96/68 - 151/70)  RR: 16 (08-05-23 @ 13:17) (16 - 18)  SpO2: 100% (08-05-23 @ 13:17) (98% - 100%)  Wt(kg): --    PE  NAD  Awake, alert  Anicteric, MMM  RRR  CTAB  Abd soft, NT, ND  No c/c/e  No rash grossly  FROM                          9.0    25.39 )-----------( 162      ( 04 Aug 2023 23:10 )             27.5       08-04    138  |  103  |  6<L>  ----------------------------<  115<H>  4.1   |  24  |  0.67    Ca    8.7      04 Aug 2023 21:42    TPro  6.6  /  Alb  3.6  /  TBili  0.4  /  DBili  x   /  AST  31  /  ALT  20  /  AlkPhos  129<H>  08-04  
Registration Time:  Initial EC:53  Called by ED: 12:55  Saw patient at bedside: 12:58  Called Cath Attendin:00pm  Balloon Time: N/A    Patient seen and evaluated at bedside    Chief Complaint: Chest pain, SOB, new LBBB    HPI:   67 yo F w PMHx HTN, HLD, CAD s/p 4 stents (last 2022) on ASA and Plavix, L breast CA s/p lumpectomy on chemo last  and Neulasta Onpro, s/p total hysterectomy () presented to the ED  for positive C diff result.     Today , patient reported acute onset of severe pleuritic chest pain and SOB. Pain reproducible to palpation, worse with inspiration. ECG at bedside with sinus tachycardia and LBBB, new from prior ECGs. Cardiology consulted for concern for STEMI equivalent.    On eval, patient uncomfortable appearing, diaphoretic, SOB. Still tachycardic, BP 130s/50s. Family reports compliance with DAPT.     PMHx:   Hyperlipemia    Hypertension    Arthritis    Breast cancer    CAD (coronary artery disease)        PSHx:   History of D&C    S/P breast lumpectomy    S/P total hysterectomy    S/P primary angioplasty with coronary stent        Home Meds:    Current Meds:   acetaminophen     Tablet .. 650 milliGRAM(s) Oral every 6 hours PRN  aluminum hydroxide/magnesium hydroxide/simethicone Suspension 30 milliLiter(s) Oral every 4 hours PRN  aspirin  chewable 81 milliGRAM(s) Oral daily  aspirin  chewable 324 milliGRAM(s) Oral once  atorvastatin 80 milliGRAM(s) Oral at bedtime  clopidogrel Tablet 75 milliGRAM(s) Oral daily  enoxaparin Injectable 40 milliGRAM(s) SubCutaneous every 24 hours  heparin   Injectable 4000 Unit(s) IV Push once  heparin  Infusion.  Unit(s)/Hr IV Continuous <Continuous>  loratadine 10 milliGRAM(s) Oral daily  melatonin 3 milliGRAM(s) Oral at bedtime PRN  nitroglycerin     SubLingual 0.4 milliGRAM(s) SubLingual every 5 minutes PRN  ondansetron Injectable 4 milliGRAM(s) IV Push every 8 hours PRN  ticagrelor 180 milliGRAM(s) Oral once  vancomycin    Solution 125 milliGRAM(s) Oral four times a day      Allergies:  Dilaudid (Other)  morphine (Pruritus)  adhesives (Rash)      FAMILY HISTORY:  FH: HTN (hypertension)    FH: CAD (coronary artery disease)    REVIEW OF SYSTEMS:  All other review of systems is negative unless indicated above.    Physical Exam:  T(F): 98.2 (-), Max: 98.2 (-)  HR: 85 () (75 - 85)  BP: 138/52 (-) (136/58 - 138/52)  RR: 18 (-)  SpO2: 99% ()  GENERAL: Uncomfortable appearing, lying in bed  ENT: EOMI, PERRLA, conjunctiva and sclera clear, Neck supple, No JVD, moist mucosa  CHEST/LUNG: Clear to auscultation bilaterally; No wheeze, equal breath sounds bilaterally   HEART: Tachycardic; No murmurs, rubs, or gallops  ABDOMEN: Soft, Nontender, Nondistended; Bowel sounds present  EXTREMITIES:  No clubbing, cyanosis, or edema  NEUROLOGY: AAOx3, non-focal, cranial nerves intact  SKIN: Normal color, No rashes or lesions  LINES:    Cardiovascular Diagnostic Testing:    ECG: Personally reviewed: Sinus tach, LBBB    Echo:    Stress Testing:    Cath:  Study Date:     2022   Name:           DIETER GUILLORY   :            1955   (67 years)   Gender:         female   MR#:            [id number]   [facility]#:           [id number]   Patient Class:  Inpatient     Cath Lab Report    Interventional Cardiologist:   Vasiliy Recio MD   Fellow:                        Umberto Chang MD   Referring Physician:           Piter Tolentino MD     Procedures Performed   Procedures:               1.    Ultrasound Guided Access     2.    Arterial Access - Right Femoral   3.    PCI: LEONCIO   4.    IVUS     Indications:                Staged Procedures     Conclusions:   Successful IVUS guided PCI with stent to distal RCA. Continue DAPT for  3-6 months. Continue aggressive risk factor    modification.     Procedure Narrative:   The risks and alternatives of the procedures and conscious sedation  were explained to the patient and informed consent was  obtained. The patient was brought to the cath lab and placed on the  exam table.  Access   Right femoral artery:   The puncture site was infiltrated with 1% Lidocaine. Vascular access  was obtained using modified seldinger technique.    Coronary Angiography   Right Coronary System:   A 6FR JR 4.0 LAUNCHER was positioned into the vessel ostia under  fluoroscopic guidance. Contrast injections were performed  using hand injection.    Diagnostic Findings:     Coronary Angiography   The coronary circulation is right dominant.      RCA   Distal right coronary artery: There is a 90 % stenosis. Intravascular  ultrasound was performed. Imaging shows plaque is fibrotic  and distal reference diameter of 3.5 mm. Based on the results, the  lesion was judged to be significant and an intervention was  performed.      Interventional Findings:     Interventional Details     Patient: DIETER GUILLORY             MRN: [id number]  Study Date: 2022   09:58 AM      Page 1 of 3          Distal right coronary artery: This was a 90 % De Dajuan stenosis. This  was an ACC/AHA High/C lesion for intervention. Guidewire  crossing was successful.      A successful Drug Eluting Stent was deployed using a 3.50 X 16 SYNERGY  XD, a BMW UNIVERSAL 190, and a 6FR JR 4.0  LAUNCHER.      The inflation pressure was 22 henrik for the duration of 31.0 seconds.     A successful Balloon angioplasty was performed using a 3.75 X 8  EUPHORA NC.    The inflation pressure was 0 henrik for the duration of 15.0 seconds.     The inflation pressure was 0 henrik for the duration of 12.0 seconds.     Following intervention there is a 1 % residual stenosis. There was  ELE Flow 3 before the procedure and ELE Flow 3 following the  procedure.        Imaging:    Labs: Personally reviewed                        8.7    10.15 )-----------( 119      ( 07 Aug 2023 06:28 )             27.0     08-07    142  |  109<H>  |  4<L>  ----------------------------<  135<H>  4.0   |  22  |  0.70    Ca    8.7      07 Aug 2023 06:28  Phos  4.5     08-07  Mg     2.00     08-07    TPro  6.0  /  Alb  3.6  /  TBili  0.4  /  DBili  x   /  AST  20  /  ALT  17  /  AlkPhos  98  08-07    PT/INR - ( 06 Aug 2023 07:00 )   PT: 11.3 sec;   INR: 1.01 ratio         PTT - ( 06 Aug 2023 07:00 )  PTT:27.2 sec        Total Cholesterol: 118  LDL: --  HDL: 14  T

## 2023-08-07 NOTE — RAPID RESPONSE TEAM SUMMARY - NSADDTLFINDINGSRRT_GEN_ALL_CORE
On arrival, Vitals 180/90, tachypneic to 20s, HR 140s, O2 saturation 100 on RA, afebrile. Given LBBB, will initiate heparin gtt while continuing dapt with cardiology guidance although LBBB could be functional conduction disorder given tachycardia and chest pain could be noncardiogenic. Nitroglycerin tablet given with some improvement in pain.  - Primary team to follow up cardiology recs.  - CTA to assess for PE  - trend cardiac enzymes

## 2023-08-07 NOTE — CHART NOTE - NSCHARTNOTEFT_GEN_A_CORE
At approximately 12:00, Mrs. Barney was experiencing difficulty breathing while walking to the restroom. Her daughter, who was at bedside, notified the nurses and physicians. Upon entering the room and assessing the patient, the patient was demonstrating labored breathing. However, her lungs were clear to ascultation and her pulse ox was reading 100%. Her BP was elevated and she was tachycardic (150-180s). A RRT was then called. Please see RRT summary note for further details.

## 2023-08-07 NOTE — CONSULT NOTE ADULT - ASSESSMENT
69 yo F w PMHx HTN, HLD, CAD s/p 4 stents (last 5/2022) on ASA and Plavix, L breast CA s/p lumpectomy on chemo last 7/27 and Neulasta Onpro, s/p total hysterectomy (2016) presented to the ED 8/5 for positive C diff result, now with new onset CP, SOB, and new LBBB on ECG.    Case discussed with interventional cardiology attending Dr Jaime and consult attending Dr Mart. While symptoms may be due to ACS, differential is broad and would be concerned about acute PE in patient with active cancer and the above symptoms. Bundle branch block may be rate related. Alternative dxs include acute pericarditis with pericardial effusion, costochondritis.  Will start heparin gtt, trend cardiac enzymes, and obtain stat TTE, would also recommend CTPE if stable.    Recommendations:  - Obtain troponins, CK, CKMB, trend trops to peak  - Stat TTE  - CTPE if clinically feasible  - We will re-evaluate patient routinely and update her primary cardiologist on events
67 yo F w PMHx HTN, HLD, CAD s/p stents on ASA and Plavix, L breast CA s/p lumpectomy on chemo last 7/27 and Neulasta Onpro, s/p total hysterectomy (2016) presenting to the ED for positive C diff result.     C. difficile  -- On vancomycin, plan for 10 day course  -- Care per medicine  -- CT a/p with Fluid throughout the bowel with no formed stool suggestive of diarrheal illness. Otherwise no acute findings.    Left breast cancer   -- [place] [facility] [place]/p lumpectomy 05/23/2023   -- On active treatment with docetaxel/cyclophosphamide, 07/27 + Neulasta   -- No systemic treatment while admitted   -- Suspect body aches/bony pains due to Neulasta.     Leukocytosis  -- Suspect related to neulasta, current infection  -- Continue to monitor CBC    Vincent Stephens PA-C  Hematology/Oncology  [facility]  [phone] (office)

## 2023-08-07 NOTE — PROGRESS NOTE ADULT - TIME BILLING
The necessity of the time spent during the encounter on this date of service was due to:   - Direct patient care ( interview and independently obtaining a review of systems and performing a physical exam)and documentation  - Ordering, reviewing, and interpreting labs, testing, and imaging   - Reviewing prior hospitalization and where necessary, outpatient records.  - Discussion with consultants, other providers, support staff  - Counselling and educating patient and family regarding interpretation of aforementioned items and plan of care.
Multiple re-evaluations during RRT and during TTE, updates to daughter, coordinating care.

## 2023-08-08 ENCOUNTER — TRANSCRIPTION ENCOUNTER (OUTPATIENT)
Age: 68
End: 2023-08-08

## 2023-08-08 DIAGNOSIS — R06.00 DYSPNEA, UNSPECIFIED: ICD-10-CM

## 2023-08-08 LAB
ANION GAP SERPL CALC-SCNC: 12 MMOL/L — SIGNIFICANT CHANGE UP (ref 7–14)
APTT BLD: 72.8 SEC — HIGH (ref 24.5–35.6)
APTT BLD: 74.8 SEC — HIGH (ref 24.5–35.6)
BUN SERPL-MCNC: 4 MG/DL — LOW (ref 7–23)
CALCIUM SERPL-MCNC: 9.1 MG/DL — SIGNIFICANT CHANGE UP (ref 8.4–10.5)
CHLORIDE SERPL-SCNC: 107 MMOL/L — SIGNIFICANT CHANGE UP (ref 98–107)
CO2 SERPL-SCNC: 24 MMOL/L — SIGNIFICANT CHANGE UP (ref 22–31)
CREAT SERPL-MCNC: 0.68 MG/DL — SIGNIFICANT CHANGE UP (ref 0.5–1.3)
EGFR: 95 ML/MIN/1.73M2 — SIGNIFICANT CHANGE UP
GLUCOSE SERPL-MCNC: 117 MG/DL — HIGH (ref 70–99)
HCT VFR BLD CALC: 27.6 % — LOW (ref 34.5–45)
HCT VFR BLD CALC: 28.1 % — LOW (ref 34.5–45)
HGB BLD-MCNC: 9 G/DL — LOW (ref 11.5–15.5)
HGB BLD-MCNC: 9 G/DL — LOW (ref 11.5–15.5)
MAGNESIUM SERPL-MCNC: 2.1 MG/DL — SIGNIFICANT CHANGE UP (ref 1.6–2.6)
MCHC RBC-ENTMCNC: 29.5 PG — SIGNIFICANT CHANGE UP (ref 27–34)
MCHC RBC-ENTMCNC: 29.9 PG — SIGNIFICANT CHANGE UP (ref 27–34)
MCHC RBC-ENTMCNC: 32 GM/DL — SIGNIFICANT CHANGE UP (ref 32–36)
MCHC RBC-ENTMCNC: 32.6 GM/DL — SIGNIFICANT CHANGE UP (ref 32–36)
MCV RBC AUTO: 91.7 FL — SIGNIFICANT CHANGE UP (ref 80–100)
MCV RBC AUTO: 92.1 FL — SIGNIFICANT CHANGE UP (ref 80–100)
NRBC # BLD: 0 /100 WBCS — SIGNIFICANT CHANGE UP (ref 0–0)
NRBC # BLD: 0 /100 WBCS — SIGNIFICANT CHANGE UP (ref 0–0)
NRBC # FLD: 0.05 K/UL — HIGH (ref 0–0)
NRBC # FLD: 0.06 K/UL — HIGH (ref 0–0)
PHOSPHATE SERPL-MCNC: 5.6 MG/DL — HIGH (ref 2.5–4.5)
PLATELET # BLD AUTO: 115 K/UL — LOW (ref 150–400)
PLATELET # BLD AUTO: 125 K/UL — LOW (ref 150–400)
POTASSIUM SERPL-MCNC: 4.1 MMOL/L — SIGNIFICANT CHANGE UP (ref 3.5–5.3)
POTASSIUM SERPL-SCNC: 4.1 MMOL/L — SIGNIFICANT CHANGE UP (ref 3.5–5.3)
RBC # BLD: 3.01 M/UL — LOW (ref 3.8–5.2)
RBC # BLD: 3.05 M/UL — LOW (ref 3.8–5.2)
RBC # FLD: 15.1 % — HIGH (ref 10.3–14.5)
RBC # FLD: 15.3 % — HIGH (ref 10.3–14.5)
SODIUM SERPL-SCNC: 143 MMOL/L — SIGNIFICANT CHANGE UP (ref 135–145)
TROPONIN T, HIGH SENSITIVITY RESULT: 22 NG/L — SIGNIFICANT CHANGE UP
WBC # BLD: 10.87 K/UL — HIGH (ref 3.8–10.5)
WBC # BLD: 8.19 K/UL — SIGNIFICANT CHANGE UP (ref 3.8–10.5)
WBC # FLD AUTO: 10.87 K/UL — HIGH (ref 3.8–10.5)
WBC # FLD AUTO: 8.19 K/UL — SIGNIFICANT CHANGE UP (ref 3.8–10.5)

## 2023-08-08 PROCEDURE — 99232 SBSQ HOSP IP/OBS MODERATE 35: CPT

## 2023-08-08 RX ORDER — VANCOMYCIN HCL 1 G
1 VIAL (EA) INTRAVENOUS
Qty: 28 | Refills: 0
Start: 2023-08-08 | End: 2023-08-14

## 2023-08-08 RX ORDER — LORATADINE 10 MG/1
1 TABLET ORAL
Qty: 0 | Refills: 0 | DISCHARGE
Start: 2023-08-08

## 2023-08-08 RX ORDER — SIMETHICONE 80 MG/1
80 TABLET, CHEWABLE ORAL DAILY
Refills: 0 | Status: DISCONTINUED | OUTPATIENT
Start: 2023-08-08 | End: 2023-08-09

## 2023-08-08 RX ADMIN — HEPARIN SODIUM 900 UNIT(S)/HR: 5000 INJECTION INTRAVENOUS; SUBCUTANEOUS at 07:51

## 2023-08-08 RX ADMIN — Medication 125 MILLIGRAM(S): at 17:58

## 2023-08-08 RX ADMIN — HEPARIN SODIUM 900 UNIT(S)/HR: 5000 INJECTION INTRAVENOUS; SUBCUTANEOUS at 00:41

## 2023-08-08 RX ADMIN — Medication 125 MILLIGRAM(S): at 23:56

## 2023-08-08 RX ADMIN — Medication 125 MILLIGRAM(S): at 12:28

## 2023-08-08 RX ADMIN — ATORVASTATIN CALCIUM 80 MILLIGRAM(S): 80 TABLET, FILM COATED ORAL at 22:08

## 2023-08-08 RX ADMIN — HEPARIN SODIUM 900 UNIT(S)/HR: 5000 INJECTION INTRAVENOUS; SUBCUTANEOUS at 07:07

## 2023-08-08 RX ADMIN — Medication 125 MILLIGRAM(S): at 05:19

## 2023-08-08 RX ADMIN — Medication 81 MILLIGRAM(S): at 08:13

## 2023-08-08 RX ADMIN — LORATADINE 10 MILLIGRAM(S): 10 TABLET ORAL at 08:13

## 2023-08-08 RX ADMIN — CLOPIDOGREL BISULFATE 75 MILLIGRAM(S): 75 TABLET, FILM COATED ORAL at 08:12

## 2023-08-08 NOTE — PROGRESS NOTE ADULT - ATTENDING COMMENTS
68 year old female with PMHx of breast cancer s/p lumpectomy, on active chemo (last on 7/27) and neulasta (last dose 7/28), HTN, HLD, CAD s/p 4 stents (on DAPT) who's admitted for C. diff on PO Vancomycin. Course c/b RRT for tachycardia, tachypnea, dyspnea and chest pain.     Patient reports symptoms have completely resolved. Denies any lightheadedness, CP, SOB. Denies any anxiety or sx prior to acute onset of sx. Daughter reported more anxiety since COVID, denies hx of panic attacks. Reports sx improvement 30 minutes after Nitro given.     # Dyspnea, tachycardia: CT PE study neg for PE, PNA, PNX, dissection. TTE neg for SWMA, effusion. Derik very mildly elevated, lower suspicion for ACS as sx improved well after onset of action of SL nitro. ?panic attack as cause of sx?  Appreciate Cardiology recommendations   # C diff colitis: Cont PO Vanc 125 mg QID x 10 days   # H/o HTN- BP meds on hold.   # Leukocytosis: C diff, Neulasta, improving.   # CAD: Cont ASA, Plavix, statin.   #Anemia at baseline, likely of chronic disease     D/w daughter at bedside. Plan for D/C home tomorrow if sx do not return. 68 year old female with PMHx of breast cancer s/p lumpectomy, on active chemo (last on 7/27) and neulasta (last dose 7/28), HTN, HLD, CAD s/p 4 stents (on DAPT) who's admitted for C. diff on PO Vancomycin. Course c/b RRT for tachycardia, tachypnea, dyspnea and chest pain.     Patient reports symptoms have completely resolved. Denies any lightheadedness, CP, SOB. Denies any anxiety or sx prior to acute onset of sx. Daughter reported more anxiety since COVID, denies hx of panic attacks. Reports sx improvement 30 minutes after Nitro given.   EKG: NSR, no BBB     # Dyspnea, tachycardia: CT PE study neg for PE, PNA, PNX, dissection. TTE neg for SWMA, effusion. Derik very mildly elevated, lower suspicion for ACS as sx improved well after onset of action of SL nitro. panic attack as cause of sx?  EKG w/ NSR, LBBB likely rate related. Appreciate Cardiology recommendations   # C diff colitis: Cont PO Vanc 125 mg QID x 10 days   # H/o HTN- BP meds on hold.   # Leukocytosis: C diff, Neulasta, improving.   # CAD: Cont ASA, Plavix, statin.   #Anemia at baseline, likely of chronic disease     D/w daughter at bedside. Plan for D/C home tomorrow if sx do not return.

## 2023-08-08 NOTE — DISCHARGE NOTE PROVIDER - NSDCCPCAREPLAN_GEN_ALL_CORE_FT
PRINCIPAL DISCHARGE DIAGNOSIS  Diagnosis: C. difficile diarrhea  Assessment and Plan of Treatment: You came to the hospital after you were notified of your positive C.dificile stool test. In this time, you were experiencing many episodes of watery diarrhea, which was due to the C. dificile infection. For this, you were started on vancomycin  in the hospital. You should take this medication 4 times a day with your last day being 8/15/23. Please be sure to take this medication for the entirety of its course as it will help eliminate the infection.      SECONDARY DISCHARGE DIAGNOSES  Diagnosis: Acute dyspnea  Assessment and Plan of Treatment: During your hopsital stay, you had an instance of difficulty breathing and associated chest pain. For this, you had an echocardiogram, EKG, and a CT done. These demonstrated there was not a concern for a pulmonary embolism or heart attack. You were then transferred to the telemetry unit for additonal monitoring. After this one instance, your symptoms improved. We recommed following up with your cardiologist afterwards.    Diagnosis: Breast cancer  Assessment and Plan of Treatment: You have a known hisotry of breast cancer for which you have undergone surgery and chemotherapy. Be sure to follow up with your cancer doctor to resume your treatment.     PRINCIPAL DISCHARGE DIAGNOSIS  Diagnosis: C. difficile diarrhea  Assessment and Plan of Treatment: You came to the hospital after you were notified of your positive C.dificile stool test. In this time, you were experiencing many episodes of watery diarrhea, which was due to the C. dificile infection. For this, you were started on vancomycin  in the hospital. This helped your symptoms as your watery diarrhea improved. You should take your vancomycin 4 times a day with your last day being 8/14/23. Please be sure to take this medication for the entirety of its course as it will help eliminate the infection.      SECONDARY DISCHARGE DIAGNOSES  Diagnosis: Acute dyspnea  Assessment and Plan of Treatment: During your hopsital stay, you had an instance of difficulty breathing and associated chest pain. For this, you had an echocardiogram, EKG, and a CT done. These demonstrated there was not a concern for a pulmonary embolism or heart attack. You were then transferred to the telemetry unit for additonal monitoring. After this one instance, your symptoms improved. We recommed following up with your cardiologist afterwards. You should also follow with a psychiatrist in case this was due to anxiety.    Diagnosis: Breast cancer  Assessment and Plan of Treatment: You have a known hisotry of breast cancer for which you have undergone surgery and chemotherapy. Be sure to follow up with your cancer doctor to resume your treatment.     PRINCIPAL DISCHARGE DIAGNOSIS  Diagnosis: C. difficile diarrhea  Assessment and Plan of Treatment: You came to the hospital after you were notified of your positive C.dificile stool test. In this time, you were experiencing many episodes of watery diarrhea, which was due to the C. dificile infection. For this, you were started on vancomycin  in the hospital. This helped your symptoms as your watery diarrhea improved. You should take your vancomycin 4 times a day with your last day being 8/14/23. Please be sure to take this medication for the entirety of its course as it will help eliminate the infection.  Please contact your PCP if you have worsening diarrhea symptoms and have recurrence of any of your prior symptoms  Please followup with your PCP in 1-2 weeks for evalaution of your general health      SECONDARY DISCHARGE DIAGNOSES  Diagnosis: Acute dyspnea  Assessment and Plan of Treatment: During your hopsital stay, you had an instance of difficulty breathing and associated chest pain. For this, you had an echocardiogram, EKG, and a CT done. These demonstrated there was not a concern for a pulmonary embolism or heart attack. You were then transferred to the telemetry unit for additonal monitoring. After this one instance, your symptoms improved.   We recommed following up with your cardiologist afterwards. You should also follow with a psychiatrist in case this was due to anxiety.    Diagnosis: Breast cancer  Assessment and Plan of Treatment: You have a known hisotry of breast cancer for which you have undergone surgery and chemotherapy. Be sure to follow up with your cancer doctor to resume your treatment.     PRINCIPAL DISCHARGE DIAGNOSIS  Diagnosis: C. difficile diarrhea  Assessment and Plan of Treatment: You came to the hospital after you were notified of your positive C.dificile stool test. In this time, you were experiencing many episodes of watery diarrhea, which was due to the C. dificile infection. For this, you were started on vancomycin  in the hospital. This helped your symptoms as your watery diarrhea improved. You should take your vancomycin 4 times a day with your last day being 8/14/23. Please be sure to take this medication for the entirety of its course as it will help eliminate the infection.  Please DO NOT take your blood pressure medications as discussed for a few days as  your blood pressure is normal. Follow up with your PCP and re-check your blood pressure prior to restarting.   Please contact your PCP if you have worsening diarrhea symptoms and have recurrence of any of your prior symptoms  Please followup with your PCP in 1-2 weeks for evalaution of your general health      SECONDARY DISCHARGE DIAGNOSES  Diagnosis: Acute dyspnea  Assessment and Plan of Treatment: During your hopsital stay, you had an instance of difficulty breathing and associated chest pain. For this, you had an echocardiogram, EKG, and a CT done. These demonstrated there was not a concern for a pulmonary embolism or heart attack. You were then transferred to the telemetry unit for additonal monitoring. After this one instance, your symptoms improved.   We recommed following up with your cardiologist afterwards. You should also follow with a psychiatrist in case this was due to anxiety.    Diagnosis: Breast cancer  Assessment and Plan of Treatment: You have a known hisotry of breast cancer for which you have undergone surgery and chemotherapy. Be sure to follow up with your cancer doctor to resume your treatment.     PRINCIPAL DISCHARGE DIAGNOSIS  Diagnosis: C. difficile diarrhea  Assessment and Plan of Treatment: You came to the hospital after you were notified of your positive C.dificile stool test. In this time, you were experiencing many episodes of watery diarrhea, which was due to the C. dificile infection. For this, you were started on vancomycin  in the hospital. This helped your symptoms as your watery diarrhea improved. You should take your vancomycin 4 times a day with your last day being 8/14/23. Please be sure to take this medication for the entirety of its course as it will help eliminate the infection.  Please DO NOT take your blood pressure medications as discussed for a few days as  your blood pressure is normal. Follow up with your PCP and re-check your blood pressure prior to restarting.   Please contact your PCP if you have worsening diarrhea symptoms and have recurrence of any of your prior symptoms  Please followup with your PCP in 1-2 weeks for evalaution of your general health      SECONDARY DISCHARGE DIAGNOSES  Diagnosis: Acute dyspnea  Assessment and Plan of Treatment: During your hopsital stay, you had an instance of difficulty breathing and associated chest pain. For this, you had an echocardiogram, EKG, and a CT done. These demonstrated there was not a concern for a pulmonary embolism or heart attack. You were then transferred to the telemetry unit for additonal monitoring. After this one instance, your symptoms improved.   We recommed following up with your cardiologist afterwards. You should also follow with a psychiatrist in case this was due to anxiety.  Please call:  [facility]   [phone]  If you [facility] to [facility].    Diagnosis: Hypertension  Assessment and Plan of Treatment: You have a history of high blood pressure and take blood pressure meds at home. We held these medications due to low blood pressures and throughout your course, your blood pressures have been stable.  We recommend holding your Metoprolol, Spironolactone, and Amlodipine  Please follow up with your PCP to evaluate the need to restart your blood pressure medications    Diagnosis: Breast cancer  Assessment and Plan of Treatment: You have a known hisotry of breast cancer for which you have undergone surgery and chemotherapy. Be sure to follow up with your cancer doctor to resume your treatment.

## 2023-08-08 NOTE — DISCHARGE NOTE PROVIDER - NSDCCPTREATMENT_GEN_ALL_CORE_FT
PRINCIPAL PROCEDURE  Procedure: CT abdomen pelvis  Findings and Treatment: INTERPRETATION:  CLINICAL INFORMATION: Abdominal pain. Constipation.   History of breast cancer.  COMPARISON: CT abdomen pelvis 7/11/2023  CONTRAST/COMPLICATIONS:  IV Contrast: Omnipaque 350  90 cc administered   10 cc discarded  Oral Contrast: NONE  Complications: None reported at time of study completion  PROCEDURE:  CT of the Abdomen and Pelvis was performed.  Sagittal and coronal reformats were performed.  FINDINGS:  LOWER CHEST: Coronary artery calcifications. Postsurgical changes of the   left breast.  LIVER: Within normal limits.  BILE DUCTS: Normal caliber.  GALLBLADDER: Within normal limits.  SPLEEN: Within normal limits.  PANCREAS: Within normal limits.  ADRENALS: Within normal limits.  KIDNEYS/URETERS: Left renal cyst. No hydronephrosis.  BLADDER: Minimally distended.  REPRODUCTIVE ORGANS: Hysterectomy.  BOWEL: No bowel obstruction. Appendixis normal. Fluid throughout the   bowel with no formed stool.  PERITONEUM: No ascites.  VESSELS: Atherosclerotic changes.  RETROPERITONEUM/LYMPH NODES: No lymphadenopathy.  ABDOMINAL WALL: Within normal limits.  BONES: Degenerative changes.  IMPRESSION:  Fluid throughout the bowel with no formed stool suggestive of diarrheal   illness. Otherwise no acute findings.        SECONDARY PROCEDURE  Procedure: CT angiogram chest w contrast  Findings and Treatment: INTERPRETATION:  CLINICAL INFORMATION: 68F hx of L Breast Cancer and   chemotherapy. Sudden Onset Dyspnea/Tachycardia. Evaluate for pulmonary   embolism.  COMPARISON: December 19, 2022  CONTRAST/COMPLICATIONS:  IV Contrast: Omnipaque 350  80 cc administered   20 cc discarded  Oral Contrast: NONE  Complications: None reported at time of study completion  PROCEDURE:  CT Angiography of the Chest.  Sagittal and coronal reformats were performed as well as 3D (MIP)   reconstructions.  FINDINGS:  LUNGS AND AIRWAYS: Patent central airways.  Minimal scattered areas of subsegmental atelectasis within the lungs most   pronounced within the right middle lobe which is unchanged.   No focal alveolar infiltrate/consolidation.  Stable subpleural 4 mm noncalcified nodule posterior aspect left upper   lobe axial 4-29.  No other masses or nodules.  PLEURA: No pleural effusion.  MEDIASTINUM AND CAMERON: No new or significant axillary, mediastinal or   hilar adenopathy.  VESSELS: Atherosclerotic vascular calcification thoracic aorta and   coronary arteries. No thoracic aneurysm or dissection. No acute aortic   syndrome. No evidence of pulmonary embolism.  HEART: Heart size is normal. No pericardial effusion.  CHEST WALL AND LOWER NECK: Stable 1.4 cm hypodense nodule right thyroid   lobe..  VISUALIZED UPPER ABDOMEN: Within normal limits.  BONES: Degenerative changes. No lytic or blastic process.  IMPRESSION:  Minimal scattered areas of subsegmental atelectasis within the lungs most   pronounced within the right middle lobe which is unchanged. No focal   alveolar infiltrate/consolidation.  No evidence of pulmonary embolism.  Please refer to detailed findings otherwise described above.       PRINCIPAL PROCEDURE  Procedure: CT abdomen pelvis  Findings and Treatment: INTERPRETATION:  CLINICAL INFORMATION: Abdominal pain. Constipation.   History of breast cancer.  COMPARISON: CT abdomen pelvis 7/11/2023  CONTRAST/COMPLICATIONS:  IV Contrast: Omnipaque 350  90 cc administered   10 cc discarded  Oral Contrast: NONE  Complications: None reported at time of study completion  PROCEDURE:  CT of the Abdomen and Pelvis was performed.  Sagittal and coronal reformats were performed.  FINDINGS:  LOWER CHEST: Coronary artery calcifications. Postsurgical changes of the   left breast.  LIVER: Within normal limits.  BILE DUCTS: Normal caliber.  GALLBLADDER: Within normal limits.  SPLEEN: Within normal limits.  PANCREAS: Within normal limits.  ADRENALS: Within normal limits.  KIDNEYS/URETERS: Left renal cyst. No hydronephrosis.  BLADDER: Minimally distended.  REPRODUCTIVE ORGANS: Hysterectomy.  BOWEL: No bowel obstruction. Appendixis normal. Fluid throughout the   bowel with no formed stool.  PERITONEUM: No ascites.  VESSELS: Atherosclerotic changes.  RETROPERITONEUM/LYMPH NODES: No lymphadenopathy.  ABDOMINAL WALL: Within normal limits.  BONES: Degenerative changes.  IMPRESSION:  Fluid throughout the bowel with no formed stool suggestive of diarrheal   illness. Otherwise no acute findings.        SECONDARY PROCEDURE  Procedure: CT angiogram chest w contrast  Findings and Treatment: INTERPRETATION:  CLINICAL INFORMATION: 68F hx of L Breast Cancer and   chemotherapy. Sudden Onset Dyspnea/Tachycardia. Evaluate for pulmonary   embolism.  COMPARISON: December 19, 2022  CONTRAST/COMPLICATIONS:  IV Contrast: Omnipaque 350  80 cc administered   20 cc discarded  Oral Contrast: NONE  Complications: None reported at time of study completion  PROCEDURE:  CT Angiography of the Chest.  Sagittal and coronal reformats were performed as well as 3D (MIP)   reconstructions.  FINDINGS:  LUNGS AND AIRWAYS: Patent central airways.  Minimal scattered areas of subsegmental atelectasis within the lungs most   pronounced within the right middle lobe which is unchanged.   No focal alveolar infiltrate/consolidation.  Stable subpleural 4 mm noncalcified nodule posterior aspect left upper   lobe axial 4-29.  No other masses or nodules.  PLEURA: No pleural effusion.  MEDIASTINUM AND CAMERON: No new or significant axillary, mediastinal or   hilar adenopathy.  VESSELS: Atherosclerotic vascular calcification thoracic aorta and   coronary arteries. No thoracic aneurysm or dissection. No acute aortic   syndrome. No evidence of pulmonary embolism.  HEART: Heart size is normal. No pericardial effusion.  CHEST WALL AND LOWER NECK: Stable 1.4 cm hypodense nodule right thyroid   lobe..  VISUALIZED UPPER ABDOMEN: Within normal limits.  BONES: Degenerative changes. No lytic or blastic process.  IMPRESSION:  Minimal scattered areas of subsegmental atelectasis within the lungs most   pronounced within the right middle lobe which is unchanged. No focal   alveolar infiltrate/consolidation.  No evidence of pulmonary embolism.  Please refer to detailed findings otherwise described above.      Procedure: Transthoracic echocardiography (TTE)  Findings and Treatment:   < end of copied text >  CONCLUSIONS:  1. Mitral annular calcification, otherwise normal mitral  valve. Minimal mitral regurgitation.  2. Aortic valve not well visualized. Mild-moderate aortic  regurgitation.  3. Normal left ventricular internal dimensions and wall  thicknesses.  4. Normal left ventricular systolic function. No segmental  wall motion abnormalities.  5. Mild diastolic dysfunction (Stage I).  6. Normal right ventricular size and function.< from: Transthoracic Echocardiogram (08.07.23 @ 13:01) >

## 2023-08-08 NOTE — DISCHARGE NOTE PROVIDER - CARE PROVIDERS DIRECT ADDRESSES
,DirectAddress_Unknown ,DirectAddress_Unknown,[email or web address]

## 2023-08-08 NOTE — DISCHARGE NOTE PROVIDER - CARE PROVIDER_API CALL
BROCKWAY-MARCHELLO, JULIA PALADINO  Follow Up Time:    BROCKWAY-MARCHELLO, JULIA PALADINO  Established Patient  Follow Up Time:     Piter Tolentino  Cardiovascular Disease  [address] [address] [place] [id number]  Phone: [phone]  Fax: [phone]  Established Patient  Follow Up Time: 1 week

## 2023-08-08 NOTE — DISCHARGE NOTE PROVIDER - HOSPITAL COURSE
67 yo F w PMHx HTN, HLD, CAD s/p stents on ASA and Plavix, L breast CA s/p lumpectomy on chemo last 7/27 and Neulasta Onpro, s/p total hysterectomy (2016) presenting to the ED for positive C diff result. She initially presented to [facility] ED on 8/3 complaining of abdominal pain and constipation. She was discharged from the ED the morning of 8/4, but was then called back later on 8/4 after her stool studies were positive for C. difficile. In this time she was experiencing >10 episodes of  watery diarrhea and nausea. She was admitted for further management of her C. difficile infection. She was started on a 10 day course of oral vancomycin ( 8/5-8/15) of which she completed 5 days in the hospital. During this time period, she was experiencing an improvement in her nausea, abdominal pain, and watery diarrhea.     Also, during her hospital stay, she had one episode of acute dyspnea and pleuritic chest pain. Her heart rate was elevated during this time (150s) and her oxygen saturation was 100%. Further workup was done that ruled out a pulmonary embolism and acute coronary syndrome (CT-angio and TTE were not concerning for these events). Her EKG during this time revealed a left bundle branch block, but this was likely due to her tachycardia as it resolved in the follow up EKG. Her labs were significant for an elevated troponin, but this was likely in setting of her tachycardia and was not concerning for ACS. She was moved to the telemetry unit the following day and her symptoms improved. 67 yo F w PMHx HTN, HLD, CAD s/p stents on ASA and Plavix, L breast CA s/p lumpectomy on chemo last 7/27 and Neulasta Onpro, s/p total hysterectomy (2016) presenting to the ED for positive C diff result. She initially presented to [facility] ED on 8/3 complaining of abdominal pain and constipation. She was discharged from the ED the morning of 8/4, but was then called back later on 8/4 after her stool studies were positive for C. difficile. In this time she was experiencing >10 episodes of  watery diarrhea and nausea. She was admitted for further management of her C. difficile infection. She was started on a 10 day course of oral vancomycin ( 8/5-8/14) of which she completed 5 days in the hospital. During this time period, she was experiencing an improvement in her nausea, abdominal pain, and watery diarrhea.     Also, during her hospital stay, she had one episode of acute dyspnea and pleuritic chest pain. Her heart rate was elevated during this time (150s) and her oxygen saturation was 100%. Further workup was done that ruled out a pulmonary embolism and acute coronary syndrome (CT-angio and TTE were not concerning for these events). Her EKG during this time revealed a left bundle branch block, but this was likely due to her tachycardia as it resolved in the follow up EKG. Her labs were significant for an elevated troponin, but this was likely in setting of her tachycardia and was not concerning for ACS. She was moved to the telemetry unit the following day and her symptoms improved. 69 yo F w PMHx HTN, HLD, CAD s/p stents on ASA and Plavix, L breast CA s/p lumpectomy on chemo last 7/27 and Neulasta Onpro, s/p total hysterectomy (2016) presenting to the ED for positive C diff result. She initially presented to [facility] ED on 8/3 complaining of abdominal pain and constipation. She was discharged from the ED the morning of 8/4, but was then called back later on 8/4 after her stool studies were positive for C. difficile. In this time she was experiencing >10 episodes of  watery diarrhea and nausea. She was admitted for further management of her C. difficile infection. She was started on a 10 day course of oral vancomycin ( 8/5-8/14) of which she completed 5 days in the hospital. During this time period, she was experiencing an improvement in her nausea, abdominal pain, and watery diarrhea.     Also, during her hospital stay, she had one episode of acute dyspnea and pleuritic chest pain. Her heart rate was elevated during this time (150s) and her oxygen saturation was 100%. Further workup was done that ruled out a pulmonary embolism and acute coronary syndrome (CT-angio and TTE were not concerning for these events). Her EKG during this time revealed a left bundle branch block, but this was likely due to her tachycardia as it resolved in the follow up EKG. Her labs were significant for an elevated troponin, but this was likely in setting of her tachycardia and was not concerning for ACS. She was moved to the telemetry unit the following day and her symptoms improved.     At time of discharge, patient was medically stable and ready for outpatient followup    MAJOR MEDICAL CHANGES  - Please follow up with your PCP: Dr. Dickson in 1-2 weeks for management of your breast cancer and your general health  - Please continue to take Oral Vancomycin until 8/14 to finish a full 10 day course  - Please follow up with Dr. Piter Tolentino for followup of your episode of dyspnea in 1-2 weeks     67 yo F w PMHx HTN, HLD, CAD s/p stents on ASA and Plavix, L breast CA s/p lumpectomy on chemo last 7/27 and Neulasta Onpro, s/p total hysterectomy (2016) presenting to the ED for positive C diff result. She initially presented to [facility] ED on 8/3 complaining of abdominal pain and constipation. She was discharged from the ED the morning of 8/4, but was then called back later on 8/4 after her stool studies were positive for C. difficile. In this time she was experiencing >10 episodes of  watery diarrhea and nausea. She was admitted for further management of her C. difficile infection. She was started on a 10 day course of oral vancomycin ( 8/5-8/14) of which she completed 5 days in the hospital. During this time period, she was experiencing an improvement in her nausea, abdominal pain, and watery diarrhea.     Also, during her hospital stay, patient had an RRT called for an episode of acute dyspnea and pleuritic chest pain. Her heart rate was elevated during this time (150s) and her oxygen saturation was 100%. CTA Chest was negative for PE/dissection. TTE without any SGWA and Troponins were only mildly elevated. Her EKG during this time revealed a left bundle branch block, but this was likely due to her tachycardia as it resolved in the follow up EKG. Her labs were significant for an elevated troponin, but this was likely in setting of her tachycardia and was not concerning for ACS. She was moved to the telemetry unit the following day and her symptoms improved.     At time of discharge, patient was medically stable and ready for outpatient followup    MAJOR MEDICAL CHANGES  - Please follow up with your PCP: Dr. Dickson in 1-2 weeks for management of your breast cancer and your general health  - Please continue to take Oral Vancomycin until 8/14 to finish a full 10 day course  - Please follow up with Dr. Piter Tolentino for followup of your episode of dyspnea in 1-2 weeks     67 yo F w PMHx HTN, HLD, CAD s/p stents on ASA and Plavix, L breast CA s/p lumpectomy on chemo last 7/27 and Neulasta Onpro, s/p total hysterectomy (2016) presenting to the ED for positive C diff result. She initially presented to [facility] ED on 8/3 complaining of abdominal pain and constipation. She was discharged from the ED the morning of 8/4, but was then called back later on 8/4 after her stool studies were positive for C. difficile. In this time she was experiencing >10 episodes of  watery diarrhea and nausea. She was admitted for further management of her C. difficile infection. She was started on a 10 day course of oral vancomycin ( 8/5-8/14) of which she completed 5 days in the hospital. During this time period, she was experiencing an improvement in her nausea, abdominal pain, and watery diarrhea.     Also, during her hospital stay, patient had an RRT called for an episode of acute dyspnea and pleuritic chest pain. Her heart rate was elevated during this time (150s) and her oxygen saturation was 100%. CTA Chest was negative for PE/dissection. TTE without any SGWA and Troponins were only mildly elevated. Her EKG during this time revealed a left bundle branch block, but this was likely due to her tachycardia as it resolved in the follow up EKG. Her labs were significant for an elevated troponin, but this was likely in setting of her tachycardia and was not concerning for ACS. She was moved to the telemetry unit the following day and her symptoms improved.     At time of discharge, patient was medically stable and ready for outpatient followup    MAJOR MEDICAL CHANGES  - Please follow up with your PCP: Dr. Dickson in 1-2 weeks for management of your breast cancer and your general health  - Please continue to take Oral Vancomycin until 8/14 to finish a full 10 day course  - Please stop taking BP Medications until followup with your PCP  - Please follow up with Dr. Piter Tolentino for followup of your episode of dyspnea in 1-2 weeks

## 2023-08-08 NOTE — DISCHARGE NOTE PROVIDER - NSDCMRMEDTOKEN_GEN_ALL_CORE_FT
amLODIPine 10 mg oral tablet: 1 tab(s) orally once a day-AM  aspirin 81 mg oral tablet: 1 tab(s) orally once a day  atorvastatin 80 mg oral tablet: 1 tab(s) orally once a day  clopidogrel 75 mg oral tablet: 1 tab(s) orally once a day  enalapril 20 mg oral tablet: 1 tab(s) orally once a day  loratadine 10 mg oral tablet: 1 tab(s) orally once a day  metoprolol succinate 25 mg oral tablet, extended release: 1 tab(s) orally once a day  naloxone 4 mg/0.1 mL nasal spray: 4 milligram(s) intranasally once IN CASE OF OPOID OVERDOSE  Percocet 5 mg-325 mg oral tablet: 1 tab(s) orally every 6 hours MDD: 4  spironolactone 25 mg oral tablet: 1 tab(s) orally once a day-AM  vancomycin 125 mg oral capsule: 1 cap(s) orally 4 times a day   aspirin 81 mg oral tablet: 1 tab(s) orally once a day  atorvastatin 80 mg oral tablet: 1 tab(s) orally once a day  clopidogrel 75 mg oral tablet: 1 tab(s) orally once a day  enalapril 20 mg oral tablet: 1 tab(s) orally once a day  loratadine 10 mg oral tablet: 1 tab(s) orally once a day  naloxone 4 mg/0.1 mL nasal spray: 4 milligram(s) intranasally once IN CASE OF OPOID OVERDOSE  Percocet 5 mg-325 mg oral tablet: 1 tab(s) orally every 6 hours MDD: 4  vancomycin 125 mg oral capsule: 1 cap(s) orally 4 times a day   aspirin 81 mg oral tablet: 1 tab(s) orally once a day  atorvastatin 80 mg oral tablet: 1 tab(s) orally once a day  clopidogrel 75 mg oral tablet: 1 tab(s) orally once a day  loratadine 10 mg oral tablet: 1 tab(s) orally once a day  naloxone 4 mg/0.1 mL nasal spray: 4 milligram(s) intranasally once IN CASE OF OPOID OVERDOSE  Percocet 5 mg-325 mg oral tablet: 1 tab(s) orally every 6 hours MDD: 4  vancomycin 125 mg oral capsule: 1 cap(s) orally 4 times a day

## 2023-08-08 NOTE — DISCHARGE NOTE PROVIDER - NSDCQMSTROKE_NEU_ALL_CORE
Jarret Shelton  67 y.o.  Chief Complaint   Patient presents with   • Syncope     Pt was golfing today, became dizzy and had a near syncopal episode. Did not lose consciousness. B/P was 60's/40's on scene.      Pt is a/o x4, states that he feels fine now. PIV in place PTA, 1 L NS given. PT denies chest pain.    No

## 2023-08-08 NOTE — DISCHARGE NOTE PROVIDER - PROVIDER TOKENS
PROVIDER:[TOKEN:[[id number]]] PROVIDER:[TOKEN:[[id number]],ESTABLISHEDPATIENT:[T]],PROVIDER:[TOKEN:[[id number]],FOLLOWUP:[1 week],ESTABLISHEDPATIENT:[T]]

## 2023-08-08 NOTE — DISCHARGE NOTE PROVIDER - NSDCFUSCHEDAPPT_GEN_ALL_CORE_FT
Piter Tolentino Physician [facility]  CARDIOLOGY [id number]  Scheduled Appointment: 11/01/2023

## 2023-08-09 ENCOUNTER — TRANSCRIPTION ENCOUNTER (OUTPATIENT)
Age: 68
End: 2023-08-09

## 2023-08-09 VITALS
SYSTOLIC BLOOD PRESSURE: 111 MMHG | RESPIRATION RATE: 18 BRPM | DIASTOLIC BLOOD PRESSURE: 49 MMHG | HEART RATE: 62 BPM | OXYGEN SATURATION: 100 % | TEMPERATURE: 98 F

## 2023-08-09 PROCEDURE — 99238 HOSP IP/OBS DSCHRG MGMT 30/<: CPT

## 2023-08-09 RX ORDER — AMLODIPINE BESYLATE 2.5 MG/1
1 TABLET ORAL
Qty: 0 | Refills: 0 | DISCHARGE

## 2023-08-09 RX ORDER — SPIRONOLACTONE 25 MG/1
1 TABLET, FILM COATED ORAL
Qty: 0 | Refills: 0 | DISCHARGE

## 2023-08-09 RX ADMIN — Medication 125 MILLIGRAM(S): at 05:50

## 2023-08-09 RX ADMIN — Medication 81 MILLIGRAM(S): at 11:45

## 2023-08-09 RX ADMIN — CLOPIDOGREL BISULFATE 75 MILLIGRAM(S): 75 TABLET, FILM COATED ORAL at 11:44

## 2023-08-09 RX ADMIN — Medication 125 MILLIGRAM(S): at 11:44

## 2023-08-09 RX ADMIN — LORATADINE 10 MILLIGRAM(S): 10 TABLET ORAL at 11:45

## 2023-08-09 NOTE — PROGRESS NOTE ADULT - ASSESSMENT
69 yo F w PMHx HTN, HLD, CAD s/p stents on ASA and Plavix, L breast CA s/p lumpectomy on chemo last 7/27 and Neulasta Onpro, s/p total hysterectomy (2016) presenting to the ED for positive C diff result.     C diff colitis  -- On vancomycin, plan for 10 day course to complete 08/14   -- CT a/p with fluid throughout the bowel with no formed stool suggestive of diarrheal illness. Otherwise no acute findings.   -- Blood cultures NGTD   -- Continue supportive care. Her symptoms are improving.     Left breast cancer   -- follows with Dr. Kathy Hernandez at [facility] [place]  -- s/p lumpectomy 05/23/2023   -- On active treatment with docetaxel/cyclophosphamide, 07/27 + Neulasta   -- No systemic treatment while admitted     Leukocytosis  -- Suspect related to Neulasta, current infection  -- WBC improving  -- Continue to monitor CBC    Chest pain, dyspnea   -- CTA chest w/o PE   -- Cardiac enzymes not consistent with ACS   -- Symptoms resolved     Will continue to follow.    Cheri Gutierrez PA-C  Hematology/Oncology  [facility]  [phone] (office)  [phone] (alt office)  Evenings and weekends please call MD on call or office  
67 yo F w PMHx HTN, HLD, CAD s/p stents on ASA and Plavix, L breast CA s/p lumpectomy on chemo last 7/27 and Neulasta Onpro, s/p total hysterectomy (2016) presenting to the ED for positive C diff result.     C diff colitis  -- On vancomycin, plan for 10 day course to complete 08/14   -- CT a/p with fluid throughout the bowel with no formed stool suggestive of diarrheal illness. Otherwise no acute findings.   -- Blood cultures NGTD   -- Continue supportive care     Left breast cancer   -- [place] [facility] [place] s/p lumpectomy 05/23/2023   -- On active treatment with docetaxel/cyclophosphamide, 07/27 + Neulasta   -- No systemic treatment while admitted     Leukocytosis  -- Suspect related to Neulasta, current infection  -- WBC improving  -- Continue to monitor CBC    Chest pain   -- RRT and cardiology called this afternoon for new chest pain and dyspnea, appreciate recommendations   -- CTA chest w/o PE   -- Cardiac enzymes not consistent with ACS     Will continue to follow.    Cheri Gutierrez PA-C  Hematology/Oncology  [facility]  [phone] (office)  [phone] (alt office)  Evenings and weekends please call MD on call or office  
69 yo F w PMHx HTN, HLD, CAD s/p stents on ASA and Plavix, L breast CA s/p lumpectomy on chemo last 7/27 and Neulasta Onpro, s/p total hysterectomy (2016) presenting to the ED for positive C diff result.     C diff colitis  -- On vancomycin, plan for 10 day course to complete 08/14   -- CT a/p with fluid throughout the bowel with no formed stool suggestive of diarrheal illness. Otherwise no acute findings.   -- Blood cultures no growth   -- Continue supportive care. Her symptoms are improving.     Left breast cancer   -- follows with Dr. Kathy Hernandez at [facility] [place]  -- s/p lumpectomy 05/23/2023   -- On active treatment with docetaxel/cyclophosphamide, 07/27 + Neulasta   -- No systemic treatment while admitted     Leukocytosis  -- Suspect related to Neulasta, current infection  -- WBC improving, normal today   -- Continue to monitor CBC    Chest pain, dyspnea   -- CTA chest w/o PE   -- Cardiac enzymes not consistent with ACS   -- Symptoms resolved     Planned for discharge today. Follow up at [facility] after discharge.     Cheri Gutierrez PA-C  Hematology/Oncology  [facility]  [phone] (office)  [phone] (alt office)  Evenings and weekends please call MD on call or office  
68 year old female with pmhx of breast cancer s/p lumpectomy, on active chemo and neulasta, HTN, HLD, CAD s/p 4 stents (on DAPT) comes to the ED complaining of abdominal pain and watery diarrhea in setting of C difficile infection. 
69 yo F w PMHx HTN, HLD, CAD s/p stents on ASA and Plavix, L breast CA s/p lumpectomy on chemo last 7/27 and Neulasta Onpro, s/p total hysterectomy (2016) presenting to the ED for positive C diff result.     C diff colitis  -- On vancomycin, plan for 10 day course  -- Care per medicine  -- CT a/p with Fluid throughout the bowel with no formed stool suggestive of diarrheal illness. Otherwise no acute findings.    Left breast cancer   -- follows with Dr. Kathy Hernandez at [facility] [place]  -- s/p lumpectomy 05/23/2023   -- On active treatment with docetaxel/cyclophosphamide, 07/27 + Neulasta   -- No systemic treatment while admitted   -- Suspect body aches/bony pains due to Neulasta.     Leukocytosis  -- Suspect related to neulasta, current infection  -- WBC improving  -- Continue to monitor CBC    Vincent Stephens PA-C  Hematology/Oncology  [facility]  [phone] (office)
68 year old female with pmhx of breast cancer s/p lumpectomy, on active chemo and neulasta, HTN, HLD, CAD s/p 4 stents (on DAPT) comes to the ED complaining of abdominal pain and watery diarrhea in setting of C difficile infection. 

## 2023-08-09 NOTE — DISCHARGE NOTE NURSING/CASE MANAGEMENT/SOCIAL WORK - NSDCPEFALRISK_GEN_ALL_CORE
For information on Fall & Injury Prevention, visit: [email or web address] OR  [email or web address] OR  https://www.cdc.gov/steadi/patient.html

## 2023-08-09 NOTE — PROGRESS NOTE ADULT - PROBLEM SELECTOR PLAN 7
-Diet: DASH, halal, vegetarian  -DVT ppx: lovenox 40 QD  -Dispo: Home
-Diet: DASH, halal, vegetarian  -DVT ppx: lovenox 40 QD  -Dispo: Home

## 2023-08-09 NOTE — PROGRESS NOTE ADULT - NS ATTEND AMEND GEN_ALL_CORE FT
As above.    67 y/o female with breast cancer, on TC, admitted with diarrhea and c. difficile. Continue vancomycin. Patient today had chest pain. CT did not show any PE. Troponin mildly elevated. Follow-up cardiology recs.
Pt with breast ca on TC,   C/B C diff colitis on antibiotics  Improving, continue current management
Agree with above

## 2023-08-09 NOTE — PROGRESS NOTE ADULT - PROBLEM SELECTOR PLAN 3
-BP was 110s/50s--> 135/60s s/p 1L bolus  - likely low due to volume depletion  - hold home meds for now
-Problem: Breast cancer.   -Plan: -hx of breast cancer and on active chemo  - follows at [facility] [place].  - In house Onc consulted; no plans for inpatient chemo
-BP was 110s/50s--> 135/60s s/p 1L bolus  - likely low due to volume depletion  - hold home meds for now
-Problem: Breast cancer.   -Plan: -hx of breast cancer and on active chemo  - follows at [facility] [place].  - In house Onc consulted; no plans for inpatient chemo

## 2023-08-09 NOTE — PROGRESS NOTE ADULT - SUBJECTIVE AND OBJECTIVE BOX
Patient is a 68y old  Female who presents with a chief complaint of C Difficile Infection (06 Aug 2023 09:05)    Pt seen and examined at bedside. Reports no BM yet today. No pain, feels okay.    MEDICATIONS  (STANDING):  aspirin  chewable 81 milliGRAM(s) Oral daily  atorvastatin 80 milliGRAM(s) Oral at bedtime  clopidogrel Tablet 75 milliGRAM(s) Oral daily  enoxaparin Injectable 40 milliGRAM(s) SubCutaneous every 24 hours  loratadine 10 milliGRAM(s) Oral daily  potassium chloride   Powder 40 milliEquivalent(s) Oral every 4 hours  vancomycin    Solution 125 milliGRAM(s) Oral four times a day    MEDICATIONS  (PRN):  acetaminophen     Tablet .. 650 milliGRAM(s) Oral every 6 hours PRN Temp greater or equal to 38C (100.4F), Mild Pain (1 - 3)  aluminum hydroxide/magnesium hydroxide/simethicone Suspension 30 milliLiter(s) Oral every 4 hours PRN Dyspepsia  melatonin 3 milliGRAM(s) Oral at bedtime PRN Insomnia  ondansetron Injectable 4 milliGRAM(s) IV Push every 8 hours PRN Nausea and/or Vomiting    Vital Signs Last 24 Hrs  T(C): 37.1 (06 Aug 2023 05:00), Max: 37.1 (05 Aug 2023 23:02)  T(F): 98.7 (06 Aug 2023 05:00), Max: 98.8 (05 Aug 2023 23:02)  HR: 88 (06 Aug 2023 05:00) (71 - 88)  BP: 135/61 (06 Aug 2023 05:00) (111/63 - 135/61)  BP(mean): --  RR: 18 (06 Aug 2023 05:00) (16 - 18)  SpO2: 99% (06 Aug 2023 05:00) (99% - 100%)    Parameters below as of 06 Aug 2023 05:00  Patient On (Oxygen Delivery Method): room air        PE  NAD  Awake, alert  Anicteric, MMM  No c/c/e  No rash grossly  FROM                          8.6    14.27 )-----------( 135      ( 06 Aug 2023 07:00 )             26.6       08-06    139  |  106  |  4<L>  ----------------------------<  123<H>  3.3<L>   |  21<L>  |  0.63    Ca    8.7      06 Aug 2023 07:00  Phos  4.7     08-06  Mg     2.30     08-06    TPro  6.1  /  Alb  3.4  /  TBili  0.3  /  DBili  x   /  AST  21  /  ALT  17  /  AlkPhos  104  08-06      
Patient is a 68y old  Female who presents with a chief complaint of C Difficile Infection (09 Aug 2023 08:36)    Patient seen this afternoon, daughter at bedside. She feels well, states she had a bowel movement earlier today that was semi-formed. No new issues. Planned for discharge home today.     MEDICATIONS  (STANDING):  aspirin  chewable 81 milliGRAM(s) Oral daily  atorvastatin 80 milliGRAM(s) Oral at bedtime  clopidogrel Tablet 75 milliGRAM(s) Oral daily  loratadine 10 milliGRAM(s) Oral daily  vancomycin    Solution 125 milliGRAM(s) Oral four times a day    MEDICATIONS  (PRN):  acetaminophen     Tablet .. 650 milliGRAM(s) Oral every 6 hours PRN Temp greater or equal to 38C (100.4F), Mild Pain (1 - 3)  aluminum hydroxide/magnesium hydroxide/simethicone Suspension 30 milliLiter(s) Oral every 4 hours PRN Dyspepsia  melatonin 3 milliGRAM(s) Oral at bedtime PRN Insomnia  nitroglycerin     SubLingual 0.4 milliGRAM(s) SubLingual every 5 minutes PRN Chest Pain  ondansetron Injectable 4 milliGRAM(s) IV Push every 8 hours PRN Nausea and/or Vomiting  simethicone 80 milliGRAM(s) Chew daily PRN Gas        Vital Signs Last 24 Hrs  T(C): 36.7 (09 Aug 2023 11:55), Max: 36.8 (09 Aug 2023 05:50)  T(F): 98.1 (09 Aug 2023 11:55), Max: 98.2 (09 Aug 2023 05:50)  HR: 62 (09 Aug 2023 11:55) (62 - 65)  BP: 111/49 (09 Aug 2023 11:55) (109/49 - 111/49)  BP(mean): --  RR: 18 (09 Aug 2023 11:55) (18 - 18)  SpO2: 100% (09 Aug 2023 11:55) (99% - 100%)    Parameters below as of 09 Aug 2023 05:50  Patient On (Oxygen Delivery Method): room air        PE  NAD  Awake, alert  Anicteric, MMM  No c/c/e  No rash grossly  FROM                          9.0    8.19  )-----------( 115      ( 08 Aug 2023 07:05 )             28.1       08-08    143  |  107  |  4<L>  ----------------------------<  117<H>  4.1   |  24  |  0.68    Ca    9.1      08 Aug 2023 07:05  Phos  5.6     08-08  Mg     2.10     08-08        
Patient is a 68y old  Female who presents with a chief complaint of C Difficile Infection (07 Aug 2023 13:15)    Patient seen this afternoon. Rapid response team and cardiology called to bedside for chest pain, dyspnea, and hypotension.    MEDICATIONS  (STANDING):  aspirin  chewable 81 milliGRAM(s) Oral daily  aspirin  chewable 324 milliGRAM(s) Oral once  atorvastatin 80 milliGRAM(s) Oral at bedtime  clopidogrel Tablet 75 milliGRAM(s) Oral daily  enoxaparin Injectable 40 milliGRAM(s) SubCutaneous every 24 hours  heparin   Injectable 4000 Unit(s) IV Push once  heparin  Infusion.  Unit(s)/Hr (10 mL/Hr) IV Continuous <Continuous>  loratadine 10 milliGRAM(s) Oral daily  ticagrelor 180 milliGRAM(s) Oral once  vancomycin    Solution 125 milliGRAM(s) Oral four times a day    MEDICATIONS  (PRN):  acetaminophen     Tablet .. 650 milliGRAM(s) Oral every 6 hours PRN Temp greater or equal to 38C (100.4F), Mild Pain (1 - 3)  aluminum hydroxide/magnesium hydroxide/simethicone Suspension 30 milliLiter(s) Oral every 4 hours PRN Dyspepsia  melatonin 3 milliGRAM(s) Oral at bedtime PRN Insomnia  nitroglycerin     SubLingual 0.4 milliGRAM(s) SubLingual every 5 minutes PRN Chest Pain  ondansetron Injectable 4 milliGRAM(s) IV Push every 8 hours PRN Nausea and/or Vomiting        Vital Signs Last 24 Hrs  T(C): 36.8 (07 Aug 2023 05:30), Max: 36.8 (06 Aug 2023 22:35)  T(F): 98.2 (07 Aug 2023 05:30), Max: 98.2 (06 Aug 2023 22:35)  HR: 85 (07 Aug 2023 05:30) (75 - 85)  BP: 138/52 (07 Aug 2023 05:30) (136/58 - 138/52)  BP(mean): --  RR: 18 (07 Aug 2023 05:30) (18 - 18)  SpO2: 99% (07 Aug 2023 05:30) (99% - 99%)    Parameters below as of 07 Aug 2023 05:30  Patient On (Oxygen Delivery Method): room air        PE  appears uncomfortable   Awake, alert  Anicteric, MMM  No c/c/e  No rash grossly  FROM                          10.0   11.61 )-----------( 128      ( 07 Aug 2023 12:43 )             30.3       08-07    141  |  104  |  4<L>  ----------------------------<  139<H>  3.8   |  20<L>  |  0.75    Ca    9.5      07 Aug 2023 12:43  Phos  4.5     08-07  Mg     2.00     08-07    TPro  7.3  /  Alb  4.2  /  TBili  0.5  /  DBili  x   /  AST  26  /  ALT  21  /  AlkPhos  121<H>  08-07      ACC: [id number] EXAM:  CT ANGIO CHEST PULM [facility]   ORDERED BY: BRAXTON MELLO     PROCEDURE DATE:  08/07/2023          INTERPRETATION:  CLINICAL INFORMATION: 68F hx of L Breast Cancer and   chemotherapy. Sudden Onset Dyspnea/Tachycardia. Evaluate for pulmonary   embolism.    COMPARISON: December 19, 2022    CONTRAST/COMPLICATIONS:  IV Contrast: Omnipaque 350  80 cc administered   20 cc discarded  Oral Contrast: NONE  Complications: None reported at time of study completion    PROCEDURE:  CT Angiography of the Chest.  Sagittal and coronal reformats were performed as well as 3D (MIP)   reconstructions.    FINDINGS:    LUNGS AND AIRWAYS: Patent central airways.  Minimal scattered areas of subsegmental atelectasis within the lungs most   pronounced within the right middle lobe which is unchanged.   No focal alveolar infiltrate/consolidation.  Stable subpleural 4 mm noncalcified nodule posterior aspect left upper   lobe axial 4-29.  No other masses or nodules.  PLEURA: No pleural effusion.  MEDIASTINUM AND CAMERON: No new or significant axillary, mediastinal or   hilar adenopathy.  VESSELS: Atherosclerotic vascular calcification thoracic aorta and   coronary arteries. No thoracic aneurysm or dissection. No acute aortic   syndrome. No evidence of pulmonary embolism.  HEART: Heart size is normal. No pericardial effusion.  CHEST WALL AND LOWER NECK: Stable 1.4 cm hypodense nodule right thyroid   lobe..  VISUALIZED UPPER ABDOMEN: Within normal limits.  BONES: Degenerative changes. No lytic or blastic process.    IMPRESSION:  Minimal scattered areas of subsegmental atelectasis within the lungs most   pronounced within the right middle lobe which is unchanged. No focal   alveolar infiltrate/consolidation.    No evidence of pulmonary embolism.    Please refer to detailed findings otherwise described above.      --- End of Report ---  
Patient is a 68y old  Female who presents with a chief complaint of C Difficile Infection (08 Aug 2023 13:17)    Patient seen this morning, with her daughters at bedside. She feels well today, states that chest pain and dyspnea have subsided. States that stools are improving as well, had 2 bowel movements yesterday, 1 bowel movement today so far, more formed than before. Tolerating PO, no n/v. No new complaints at this time.     MEDICATIONS  (STANDING):  aspirin  chewable 81 milliGRAM(s) Oral daily  atorvastatin 80 milliGRAM(s) Oral at bedtime  clopidogrel Tablet 75 milliGRAM(s) Oral daily  loratadine 10 milliGRAM(s) Oral daily  vancomycin    Solution 125 milliGRAM(s) Oral four times a day    MEDICATIONS  (PRN):  acetaminophen     Tablet .. 650 milliGRAM(s) Oral every 6 hours PRN Temp greater or equal to 38C (100.4F), Mild Pain (1 - 3)  aluminum hydroxide/magnesium hydroxide/simethicone Suspension 30 milliLiter(s) Oral every 4 hours PRN Dyspepsia  melatonin 3 milliGRAM(s) Oral at bedtime PRN Insomnia  nitroglycerin     SubLingual 0.4 milliGRAM(s) SubLingual every 5 minutes PRN Chest Pain  ondansetron Injectable 4 milliGRAM(s) IV Push every 8 hours PRN Nausea and/or Vomiting  simethicone 80 milliGRAM(s) Chew daily PRN Gas        Vital Signs Last 24 Hrs  T(C): 36.8 (08 Aug 2023 11:04), Max: 36.8 (07 Aug 2023 22:45)  T(F): 98.2 (08 Aug 2023 11:04), Max: 98.2 (07 Aug 2023 22:45)  HR: 64 (08 Aug 2023 11:04) (64 - 76)  BP: 113/48 (08 Aug 2023 11:04) (113/48 - 130/51)  BP(mean): --  RR: 19 (08 Aug 2023 11:04) (19 - 20)  SpO2: 99% (08 Aug 2023 11:04) (99% - 100%)    Parameters below as of 08 Aug 2023 11:04  Patient On (Oxygen Delivery Method): room air        PE  NAD  Awake, alert  Anicteric, MMM  Abd soft, NT, ND  No c/c/e  No rash grossly  FROM                          9.0    8.19  )-----------( 115      ( 08 Aug 2023 07:05 )             28.1       08-08    143  |  107  |  4<L>  ----------------------------<  117<H>  4.1   |  24  |  0.68    Ca    9.1      08 Aug 2023 07:05  Phos  5.6     08-08  Mg     2.10     08-08    TPro  7.3  /  Alb  4.2  /  TBili  0.5  /  DBili  x   /  AST  26  /  ALT  21  /  AlkPhos  121<H>  08-07      
PROGRESS NOTE:   Authored by Dr. Oli Espino MD (PGY-1).     Patient is a 68y old  Female who presents with a chief complaint of C Difficile Infection (08 Aug 2023 14:38)      SUBJECTIVE / OVERNIGHT EVENTS:  No acute events overnight. She has no acute complaints this morning.     MEDICATIONS  (STANDING):  aspirin  chewable 81 milliGRAM(s) Oral daily  atorvastatin 80 milliGRAM(s) Oral at bedtime  clopidogrel Tablet 75 milliGRAM(s) Oral daily  loratadine 10 milliGRAM(s) Oral daily  vancomycin    Solution 125 milliGRAM(s) Oral four times a day    MEDICATIONS  (PRN):  acetaminophen     Tablet .. 650 milliGRAM(s) Oral every 6 hours PRN Temp greater or equal to 38C (100.4F), Mild Pain (1 - 3)  aluminum hydroxide/magnesium hydroxide/simethicone Suspension 30 milliLiter(s) Oral every 4 hours PRN Dyspepsia  melatonin 3 milliGRAM(s) Oral at bedtime PRN Insomnia  nitroglycerin     SubLingual 0.4 milliGRAM(s) SubLingual every 5 minutes PRN Chest Pain  ondansetron Injectable 4 milliGRAM(s) IV Push every 8 hours PRN Nausea and/or Vomiting  simethicone 80 milliGRAM(s) Chew daily PRN Gas      CAPILLARY BLOOD GLUCOSE        I&O's Summary      PHYSICAL EXAM:  Vital Signs Last 24 Hrs  T(C): 36.8 (09 Aug 2023 05:50), Max: 36.8 (08 Aug 2023 11:04)  T(F): 98.2 (09 Aug 2023 05:50), Max: 98.2 (08 Aug 2023 11:04)  HR: 65 (09 Aug 2023 05:50) (64 - 65)  BP: 109/49 (09 Aug 2023 05:50) (109/49 - 113/48)  BP(mean): --  RR: 18 (09 Aug 2023 05:50) (18 - 19)  SpO2: 99% (09 Aug 2023 05:50) (99% - 99%)    Parameters below as of 09 Aug 2023 05:50  Patient On (Oxygen Delivery Method): room air        CONSTITUTIONAL: NAD, well-developed  HEET: MMM, EOMI, PERRLA  NECK: supple  RESPIRATORY: Normal respiratory effort; lungs are clear to auscultation bilaterally  CARDIOVASCULAR: Regular rate and rhythm, normal S1 and S2, no murmur/rub/gallop; No lower extremity edema; Peripheral pulses are 2+ bilaterally  ABDOMEN: Nontender to palpation, normoactive bowel sounds, no rebound/guarding; No hepatosplenomegaly  MUSCULOSKELETAL: no clubbing or cyanosis of digits; no joint swelling or tenderness to palpation  NEURO: Moving all four extremities, sensation grossly intact  PSYCH: A+O to person, place, and time; affect appropriate  SKIN: No rash    LABS:                        9.0    8.19  )-----------( 115      ( 08 Aug 2023 07:05 )             28.1     08-08    143  |  107  |  4<L>  ----------------------------<  117<H>  4.1   |  24  |  0.68    Ca    9.1      08 Aug 2023 07:05  Phos  5.6     08-08  Mg     2.10     08-08    TPro  7.3  /  Alb  4.2  /  TBili  0.5  /  DBili  x   /  AST  26  /  ALT  21  /  AlkPhos  121<H>  08-07    PTT - ( 08 Aug 2023 07:05 )  PTT:72.8 sec  CARDIAC MARKERS ( 07 Aug 2023 15:55 )  x     / x     / x     / x     / 1.3 ng/mL      Urinalysis Basic - ( 08 Aug 2023 07:05 )    Color: x / Appearance: x / SG: x / pH: x  Gluc: 117 mg/dL / Ketone: x  / Bili: x / Urobili: x   Blood: x / Protein: x / Nitrite: x   Leuk Esterase: x / RBC: x / WBC x   Sq Epi: x / Non Sq Epi: x / Bacteria: x        Culture - Blood (collected 07 Aug 2023 11:48)  Source: .Blood Blood-Peripheral  Preliminary Report (08 Aug 2023 17:01):    No growth at 24 hours    Culture - Blood (collected 07 Aug 2023 11:48)  Source: .Blood Blood-Peripheral  Preliminary Report (08 Aug 2023 17:01):    No growth at 24 hours        Tele Reviewed:    RADIOLOGY & ADDITIONAL TESTS:  Results Reviewed:   Imaging Personally Reviewed:  Electrocardiogram Personally Reviewed:    
PROGRESS NOTE:   Authored by Dr. Oli Espino MD (PGY-1).     Patient is a 68y old  Female who presents with a chief complaint of C Difficile Infection (05 Aug 2023 15:28)      SUBJECTIVE / OVERNIGHT EVENTS:  No acute events overnight. She has no acute complaints this morning and passed 3 watery BMs in the past 24 hrs.     MEDICATIONS  (STANDING):  aspirin  chewable 81 milliGRAM(s) Oral daily  atorvastatin 80 milliGRAM(s) Oral at bedtime  clopidogrel Tablet 75 milliGRAM(s) Oral daily  enoxaparin Injectable 40 milliGRAM(s) SubCutaneous every 24 hours  loratadine 10 milliGRAM(s) Oral daily  vancomycin    Solution 125 milliGRAM(s) Oral four times a day    MEDICATIONS  (PRN):  acetaminophen     Tablet .. 650 milliGRAM(s) Oral every 6 hours PRN Temp greater or equal to 38C (100.4F), Mild Pain (1 - 3)  aluminum hydroxide/magnesium hydroxide/simethicone Suspension 30 milliLiter(s) Oral every 4 hours PRN Dyspepsia  melatonin 3 milliGRAM(s) Oral at bedtime PRN Insomnia  ondansetron Injectable 4 milliGRAM(s) IV Push every 8 hours PRN Nausea and/or Vomiting      CAPILLARY BLOOD GLUCOSE        I&O's Summary      PHYSICAL EXAM:  Vital Signs Last 24 Hrs  T(C): 37.1 (06 Aug 2023 05:00), Max: 37.1 (05 Aug 2023 23:02)  T(F): 98.7 (06 Aug 2023 05:00), Max: 98.8 (05 Aug 2023 23:02)  HR: 88 (06 Aug 2023 05:00) (71 - 88)  BP: 135/61 (06 Aug 2023 05:00) (111/63 - 135/61)  BP(mean): --  RR: 18 (06 Aug 2023 05:00) (16 - 18)  SpO2: 99% (06 Aug 2023 05:00) (99% - 100%)    Parameters below as of 06 Aug 2023 05:00  Patient On (Oxygen Delivery Method): room air        CONSTITUTIONAL: NAD, well-developed  HEET: MMM, EOMI, PERRLA  NECK: supple  RESPIRATORY: Normal respiratory effort; lungs are clear to auscultation bilaterally  CARDIOVASCULAR: Regular rate and rhythm, normal S1 and S2, no murmur/rub/gallop; No lower extremity edema; Peripheral pulses are 2+ bilaterally  ABDOMEN: Nontender to palpation, normoactive bowel sounds, no rebound/guarding; No hepatosplenomegaly  MUSCULOSKELETAL: no clubbing or cyanosis of digits; no joint swelling or tenderness to palpation  NEURO: Moving all four extremities, sensation grossly intact  PSYCH: A+O to person, place, and time; affect appropriate  SKIN: No rash    LABS:                        8.6    14.27 )-----------( 135      ( 06 Aug 2023 07:00 )             26.6     08-04    138  |  103  |  6<L>  ----------------------------<  115<H>  4.1   |  24  |  0.67    Ca    8.7      04 Aug 2023 21:42    TPro  6.6  /  Alb  3.6  /  TBili  0.4  /  DBili  x   /  AST  31  /  ALT  20  /  AlkPhos  129<H>  08-04    PT/INR - ( 06 Aug 2023 07:00 )   PT: 11.3 sec;   INR: 1.01 ratio         PTT - ( 06 Aug 2023 07:00 )  PTT:27.2 sec      Urinalysis Basic - ( 04 Aug 2023 21:42 )    Color: x / Appearance: x / SG: x / pH: x  Gluc: 115 mg/dL / Ketone: x  / Bili: x / Urobili: x   Blood: x / Protein: x / Nitrite: x   Leuk Esterase: x / RBC: x / WBC x   Sq Epi: x / Non Sq Epi: x / Bacteria: x        Culture - Blood (collected 04 Aug 2023 23:10)  Source: .Blood Blood-Peripheral  Preliminary Report (06 Aug 2023 03:01):    No growth at 24 hours    Culture - Blood (collected 04 Aug 2023 21:24)  Source: .Blood Blood-Peripheral  Preliminary Report (06 Aug 2023 03:01):    No growth at 24 hours        Tele Reviewed:    RADIOLOGY & ADDITIONAL TESTS:  Results Reviewed:   Imaging Personally Reviewed:  Electrocardiogram Personally Reviewed:    
PROGRESS NOTE:   Authored by Dr. Oli Espino MD (PGY-1).     Patient is a 68y old  Female who presents with a chief complaint of C Difficile Infection (07 Aug 2023 14:43)      SUBJECTIVE / OVERNIGHT EVENTS:  No acute events overnight. She has no acute complaints this morning. Her dyspnea and diarrhea are much improved.     MEDICATIONS  (STANDING):  aspirin  chewable 81 milliGRAM(s) Oral daily  atorvastatin 80 milliGRAM(s) Oral at bedtime  clopidogrel Tablet 75 milliGRAM(s) Oral daily  heparin   Injectable 4000 Unit(s) IV Push once  heparin  Infusion.  Unit(s)/Hr (10 mL/Hr) IV Continuous <Continuous>  loratadine 10 milliGRAM(s) Oral daily  vancomycin    Solution 125 milliGRAM(s) Oral four times a day    MEDICATIONS  (PRN):  acetaminophen     Tablet .. 650 milliGRAM(s) Oral every 6 hours PRN Temp greater or equal to 38C (100.4F), Mild Pain (1 - 3)  aluminum hydroxide/magnesium hydroxide/simethicone Suspension 30 milliLiter(s) Oral every 4 hours PRN Dyspepsia  melatonin 3 milliGRAM(s) Oral at bedtime PRN Insomnia  nitroglycerin     SubLingual 0.4 milliGRAM(s) SubLingual every 5 minutes PRN Chest Pain  ondansetron Injectable 4 milliGRAM(s) IV Push every 8 hours PRN Nausea and/or Vomiting      CAPILLARY BLOOD GLUCOSE      POCT Blood Glucose.: 117 mg/dL (07 Aug 2023 12:49)    I&O's Summary      PHYSICAL EXAM:  Vital Signs Last 24 Hrs  T(C): 36.8 (08 Aug 2023 05:19), Max: 37.3 (07 Aug 2023 13:10)  T(F): 98.2 (08 Aug 2023 05:19), Max: 99.1 (07 Aug 2023 13:10)  HR: 76 (08 Aug 2023 05:19) (71 - 118)  BP: 119/49 (08 Aug 2023 05:19) (119/49 - 134/88)  BP(mean): --  RR: 20 (08 Aug 2023 05:19) (20 - 20)  SpO2: 100% (08 Aug 2023 05:19) (100% - 100%)    Parameters below as of 08 Aug 2023 05:19  Patient On (Oxygen Delivery Method): room air        CONSTITUTIONAL: NAD, well-developed  HEET: MMM, EOMI, PERRLA  NECK: supple  RESPIRATORY: Normal respiratory effort; lungs are clear to auscultation bilaterally  CARDIOVASCULAR: Regular rate and rhythm, normal S1 and S2, no murmur/rub/gallop; No lower extremity edema; Peripheral pulses are 2+ bilaterally  ABDOMEN: Nontender to palpation, normoactive bowel sounds, no rebound/guarding; No hepatosplenomegaly  MUSCULOSKELETAL: no clubbing or cyanosis of digits; no joint swelling or tenderness to palpation  NEURO: Moving all four extremities, sensation grossly intact  PSYCH: A+O to person, place, and time; affect appropriate  SKIN: No rash    LABS:                        9.0    8.19  )-----------( 115      ( 08 Aug 2023 07:05 )             28.1     08-08    143  |  107  |  4<L>  ----------------------------<  117<H>  4.1   |  24  |  0.68    Ca    9.1      08 Aug 2023 07:05  Phos  5.6     08-08  Mg     2.10     08-08    TPro  7.3  /  Alb  4.2  /  TBili  0.5  /  DBili  x   /  AST  26  /  ALT  21  /  AlkPhos  121<H>  08-07    PTT - ( 08 Aug 2023 07:05 )  PTT:72.8 sec  CARDIAC MARKERS ( 07 Aug 2023 15:55 )  x     / x     / x     / x     / 1.3 ng/mL  CARDIAC MARKERS ( 07 Aug 2023 06:28 )  x     / x     / 52 U/L / x     / <1.0 ng/mL      Urinalysis Basic - ( 08 Aug 2023 07:05 )    Color: x / Appearance: x / SG: x / pH: x  Gluc: 117 mg/dL / Ketone: x  / Bili: x / Urobili: x   Blood: x / Protein: x / Nitrite: x   Leuk Esterase: x / RBC: x / WBC x   Sq Epi: x / Non Sq Epi: x / Bacteria: x          Tele Reviewed:    RADIOLOGY & ADDITIONAL TESTS:  Results Reviewed:   Imaging Personally Reviewed:  Electrocardiogram Personally Reviewed:    
PROGRESS NOTE:   Authored by Dr. Oli Espino MD (PGY-1).     Patient is a 68y old  Female who presents with a chief complaint of C Difficile Infection (06 Aug 2023 11:02)      SUBJECTIVE / OVERNIGHT EVENTS:  No acute events overnight. She has no acute complaints this morning and has passed three semisolid stools in the past 24 hrs.     MEDICATIONS  (STANDING):  aspirin  chewable 81 milliGRAM(s) Oral daily  atorvastatin 80 milliGRAM(s) Oral at bedtime  clopidogrel Tablet 75 milliGRAM(s) Oral daily  enoxaparin Injectable 40 milliGRAM(s) SubCutaneous every 24 hours  loratadine 10 milliGRAM(s) Oral daily  vancomycin    Solution 125 milliGRAM(s) Oral four times a day    MEDICATIONS  (PRN):  acetaminophen     Tablet .. 650 milliGRAM(s) Oral every 6 hours PRN Temp greater or equal to 38C (100.4F), Mild Pain (1 - 3)  aluminum hydroxide/magnesium hydroxide/simethicone Suspension 30 milliLiter(s) Oral every 4 hours PRN Dyspepsia  melatonin 3 milliGRAM(s) Oral at bedtime PRN Insomnia  ondansetron Injectable 4 milliGRAM(s) IV Push every 8 hours PRN Nausea and/or Vomiting      CAPILLARY BLOOD GLUCOSE        I&O's Summary      PHYSICAL EXAM:  Vital Signs Last 24 Hrs  T(C): 36.8 (07 Aug 2023 05:30), Max: 36.8 (06 Aug 2023 22:35)  T(F): 98.2 (07 Aug 2023 05:30), Max: 98.2 (06 Aug 2023 22:35)  HR: 85 (07 Aug 2023 05:30) (67 - 85)  BP: 138/52 (07 Aug 2023 05:30) (121/83 - 138/52)  BP(mean): --  RR: 18 (07 Aug 2023 05:30) (18 - 18)  SpO2: 99% (07 Aug 2023 05:30) (99% - 100%)    Parameters below as of 07 Aug 2023 05:30  Patient On (Oxygen Delivery Method): room air        CONSTITUTIONAL: NAD, well-developed  HEET: MMM, EOMI, PERRLA  NECK: supple  RESPIRATORY: Normal respiratory effort; lungs are clear to auscultation bilaterally  CARDIOVASCULAR: Regular rate and rhythm, normal S1 and S2, no murmur/rub/gallop; No lower extremity edema; Peripheral pulses are 2+ bilaterally  ABDOMEN: Nontender to palpation, normoactive bowel sounds, no rebound/guarding; No hepatosplenomegaly  MUSCULOSKELETAL: no clubbing or cyanosis of digits; no joint swelling or tenderness to palpation  NEURO: Moving all four extremities, sensation grossly intact  PSYCH: A+O to person, place, and time; affect appropriate  SKIN: No rash    LABS:                        8.7    10.15 )-----------( 119      ( 07 Aug 2023 06:28 )             27.0     08-07    142  |  109<H>  |  4<L>  ----------------------------<  135<H>  4.0   |  22  |  0.70    Ca    8.7      07 Aug 2023 06:28  Phos  4.5     08-07  Mg     2.00     08-07    TPro  6.0  /  Alb  3.6  /  TBili  0.4  /  DBili  x   /  AST  20  /  ALT  17  /  AlkPhos  98  08-07    PT/INR - ( 06 Aug 2023 07:00 )   PT: 11.3 sec;   INR: 1.01 ratio         PTT - ( 06 Aug 2023 07:00 )  PTT:27.2 sec      Urinalysis Basic - ( 07 Aug 2023 06:28 )    Color: x / Appearance: x / SG: x / pH: x  Gluc: 135 mg/dL / Ketone: x  / Bili: x / Urobili: x   Blood: x / Protein: x / Nitrite: x   Leuk Esterase: x / RBC: x / WBC x   Sq Epi: x / Non Sq Epi: x / Bacteria: x        Culture - Blood (collected 04 Aug 2023 23:10)  Source: .Blood Blood-Peripheral  Preliminary Report (07 Aug 2023 03:01):    No growth at 48 Hours    Culture - Blood (collected 04 Aug 2023 21:24)  Source: .Blood Blood-Peripheral  Preliminary Report (07 Aug 2023 03:01):    No growth at 48 Hours        Tele Reviewed:    RADIOLOGY & ADDITIONAL TESTS:  Results Reviewed:   Imaging Personally Reviewed:  Electrocardiogram Personally Reviewed:

## 2023-08-09 NOTE — PROGRESS NOTE ADULT - PROBLEM SELECTOR PLAN 4
- cw atorvastatin 80mg
- cw atorvastatin 80mg
-BP was 110s/50s--> 135/60s s/p 1L bolus  - likely low due to volume depletion  - hold home meds for now
-BP was 110s/50s--> 135/60s s/p 1L bolus  - likely low due to volume depletion  - hold home meds for now

## 2023-08-09 NOTE — DISCHARGE NOTE NURSING/CASE MANAGEMENT/SOCIAL WORK - PATIENT PORTAL LINK FT
You can access the FollowMyHealth Patient Portal offered by [facility] by registering at the following website: [email or web address]. By joining [employer]’s FollowMyHealth portal, you will also be able to view your health information using other applications (apps) compatible with our system.

## 2023-08-09 NOTE — PROGRESS NOTE ADULT - PROBLEM SELECTOR PLAN 1
->10 episodes of watery diarrhea on admission--> now 3 semisolid BMs (8/7)  - Stool studies + for C dif   - elevated WBC due to infection, neulasta, and steroids (now resolved)  - on PO Vanc 125 mg QID; aiming for 10 day course (8/5-  - no need for pulse-taper vanomycin regimen as this is first episode of C dif  - s/p 1L NS bolus in setting of low BP--> responded well
->10 episodes of watery diarrhea on admission--> now 3 watery BMs   - Stool studies + for C dif   - elevated WBC due to infection, neulasta, and steroids   - on PO Vanc 125 mg QID; aiming for 10 day course (8/5-  - no need for pulse-taper vanomycin regimen as this is first episode of C dif  - s/p 1L NS bolus in setting of low BP--> responded well
- pt experienced acute dyspnea on 8/7 while walking to the bathroom  - Vitals during this time showed tachycardic (150-180) and SO2 100%, and placed on 2LNC  - s/p nitroglycerin, heparin ggt, and ASA for ACS concern  -s/p RRT during which EKG, CTA, and TTE were performed that were not concerning for PE or ACS  - Elevated troponins (59-->22) likely in setting of tachycardia   -much improved on 8/9  - continue to monitor   -Appreciate cardiology recs
-Resolved   - pt experienced acute dyspnea on 8/7 while walking to the bathroom  - Vitals during this time showed tachycardic (150-180) and SO2 100%, and placed on 2LNC  - s/p nitroglycerin, heparin ggt, and ASA for ACS concern  -s/p RRT during which EKG, CTA, and TTE were performed that were not concerning for PE or ACS  - Elevated troponins (59-->22) likely in setting of tachycardia   -much improved on 8/9  - continue to monitor   -Appreciate cardiology recs

## 2023-08-09 NOTE — PROGRESS NOTE ADULT - PROBLEM SELECTOR PLAN 2
-Problem: Breast cancer.   -Plan: -hx of breast cancer and on active chemo  - [place] [facility] [place]  - In house Onc consulted; no plans for inpatient chemo
-Problem: Breast cancer.   -Plan: -hx of breast cancer and on active chemo  [place] [facility] [place]  - In house Onc consulted; no plans for inpatient chemo
->10 episodes of watery diarrhea on admission--> now 1 semisolid BMs (8/9)  - Stool studies + for C dif   - elevated WBC due to infection, neulasta, and steroids (now resolved)  - on PO Vanc 125 mg QID; aiming for 10 day course (8/5-8/14)  - no need for pulse-taper vanomycin regimen as this is first episode of C dif  - s/p 1L NS bolus in setting of low BP--> responded well
->10 episodes of watery diarrhea on admission--> now 1 semisolid BMs (8/8)  - Stool studies + for C dif   - elevated WBC due to infection, neulasta, and steroids (now resolved)  - on PO Vanc 125 mg QID; aiming for 10 day course (8/5-  - no need for pulse-taper vanomycin regimen as this is first episode of C dif  - s/p 1L NS bolus in setting of low BP--> responded well

## 2023-08-09 NOTE — PROGRESS NOTE ADULT - PROVIDER SPECIALTY LIST ADULT
Heme/Onc
Internal Medicine

## 2023-08-09 NOTE — PROGRESS NOTE ADULT - REASON FOR ADMISSION
C Difficile Infection

## 2023-08-09 NOTE — PROGRESS NOTE ADULT - ATTENDING COMMENTS
68 year old female with PMHx of breast cancer s/p lumpectomy, on active chemo (last on 7/27) and neulasta (last dose 7/28), HTN, HLD, CAD s/p 4 stents (on DAPT) who's admitted for C. diff on PO Vancomycin. Course c/b RRT for tachycardia, tachypnea, dyspnea and chest pain.     Denies any CP, SOB. Interested in OP Psych follow up.     # Dyspnea, tachycardia: CT PE study neg for PE, PNA, PNX, dissection. TTE neg for SWMA, effusion. Derik very mildly elevated, lower suspicion for ACS as sx improved well after onset of action of SL nitro. panic attack as cause of sx?  EKG w/ NSR, LBBB likely rate related. Appreciate Cardiology recommendations. OP pscyh eval.   # C diff colitis: Cont PO Vanc 125 mg QID x 10 days   # H/o HTN- BP meds on hold.   # Leukocytosis: C diff, Neulasta.   # CAD: Cont ASA, Plavix, statin.   #Anemia at baseline, likely of chronic disease     D/w daughter at bedside. D/C home today

## 2023-08-09 NOTE — PROGRESS NOTE ADULT - PROBLEM SELECTOR PROBLEM 5
CAD (coronary artery disease)
CAD (coronary artery disease)
HLD (hyperlipidemia)
HLD (hyperlipidemia)

## 2023-08-09 NOTE — PROGRESS NOTE ADULT - PROBLEM SELECTOR PLAN 6
- cw DAPT
-Diet: DASH, halal, vegetarian  -DVT ppx: lovenox 40 QD  -Dispo: Home
-Diet: DASH, halal, vegetarian  -DVT ppx: lovenox 40 QD  -Dispo: Home
- cw DAPT

## 2023-08-16 ENCOUNTER — NON-APPOINTMENT (OUTPATIENT)
Age: 68
End: 2023-08-16

## 2023-08-16 ENCOUNTER — APPOINTMENT (OUTPATIENT)
Dept: CARDIOLOGY | Facility: CLINIC | Age: 68
End: 2023-08-16
Payer: COMMERCIAL

## 2023-08-16 VITALS
HEIGHT: 62 IN | SYSTOLIC BLOOD PRESSURE: 126 MMHG | DIASTOLIC BLOOD PRESSURE: 60 MMHG | OXYGEN SATURATION: 98 % | HEART RATE: 85 BPM | RESPIRATION RATE: 12 BRPM | WEIGHT: 188 LBS | BODY MASS INDEX: 34.6 KG/M2

## 2023-08-16 PROCEDURE — 93000 ELECTROCARDIOGRAM COMPLETE: CPT

## 2023-08-16 PROCEDURE — 99214 OFFICE O/P EST MOD 30 MIN: CPT | Mod: 25

## 2023-08-17 ENCOUNTER — NON-APPOINTMENT (OUTPATIENT)
Age: 68
End: 2023-08-17

## 2023-09-06 ENCOUNTER — APPOINTMENT (OUTPATIENT)
Dept: CARDIOLOGY | Facility: CLINIC | Age: 68
End: 2023-09-06
Payer: COMMERCIAL

## 2023-09-06 ENCOUNTER — NON-APPOINTMENT (OUTPATIENT)
Age: 68
End: 2023-09-06

## 2023-09-06 VITALS
HEART RATE: 64 BPM | WEIGHT: 186 LBS | HEIGHT: 62 IN | BODY MASS INDEX: 34.23 KG/M2 | DIASTOLIC BLOOD PRESSURE: 73 MMHG | RESPIRATION RATE: 12 BRPM | SYSTOLIC BLOOD PRESSURE: 126 MMHG | OXYGEN SATURATION: 98 % | TEMPERATURE: 97.5 F

## 2023-09-06 PROCEDURE — 99214 OFFICE O/P EST MOD 30 MIN: CPT | Mod: 25

## 2023-09-06 PROCEDURE — 93000 ELECTROCARDIOGRAM COMPLETE: CPT

## 2023-09-06 NOTE — PHYSICAL EXAM
[General Appearance - Well Developed] : well developed [Normal Appearance] : normal appearance [Well Groomed] : well groomed [General Appearance - Well Nourished] : well nourished [No Deformities] : no deformities [General Appearance - In No Acute Distress] : no acute distress [Normal Conjunctiva] : the conjunctiva exhibited no abnormalities [FreeTextEntry1] : She was wearing a face mask during the examination.  [Normal Jugular Venous A Waves Present] : normal jugular venous A waves present [Normal Jugular Venous V Waves Present] : normal jugular venous V waves present [No Jugular Venous Clemons A Waves] : no jugular venous clemons A waves [Respiration, Rhythm And Depth] : normal respiratory rhythm and effort [Exaggerated Use Of Accessory Muscles For Inspiration] : no accessory muscle use [Auscultation Breath Sounds / Voice Sounds] : lungs were clear to auscultation bilaterally [Bowel Sounds] : normal bowel sounds [Abdomen Soft] : soft [Abdomen Tenderness] : non-tender [Abnormal Walk] : normal gait [Nail Clubbing] : no clubbing of the fingernails [Cyanosis, Localized] : no localized cyanosis [Petechial Hemorrhages (___cm)] : no petechial hemorrhages [Skin Color & Pigmentation] : normal skin color and pigmentation [] : no rash [No Skin Ulcers] : no skin ulcer [Oriented To Time, Place, And Person] : oriented to person, place, and time [Affect] : the affect was normal [Mood] : the mood was normal [No Anxiety] : not feeling anxious [Normal Rate] : normal [Rhythm Regular] : regular [Normal S1] : normal S1 [Normal S2] : normal S2 [S3] : no S3 [I] : a grade 1 [II] : a grade 2 [Right Carotid Bruit] : no bruit heard over the right carotid [Left Carotid Bruit] : no bruit heard over the left carotid [Bruit] : no bruit heard [No Pitting Edema] : no pitting edema present

## 2023-09-06 NOTE — CARDIOLOGY SUMMARY
[de-identified] : 9/6/2023: Sinus Bradycardia at 55 bpm [de-identified] : Pharmacologic Nuclear Stress Test performed on 4/21/2022: There are large, mild defects in the anterior, basal to mid anteroseptal, and basal to mid anterolateral walls that are reversible suggestive of ischemia. There are large, mild to moderate defects in the\par  inferoseptal, distal inferior, inferoapical, and apical lateral walls that are mostly reversible suggestive of ischemia with partial scarring. LVEF= 66%, revealing hypokinesis of the basal septal wall and reduced systolic thickening of the distal inferior, inferoapical, and apical lateral walls. [de-identified] : 8/4/2023: Endocardium not well visualized; grossly preserved left ventricular ejection fraction with hypokinesis of  the distal inferior wall. EF is approximately 60-65%. Mild eccentric left ventricular hypertrophy. Mild (grade 1) left ventricular diastolic dysfunction. Normal right ventricular cavity size and normal right ventricular systolic function. Mild biatrial enlargement. Mild mitral stenosis. Mild mitral regurgitation. Mitral annular calcification and calcified mitral valve leaflets with decreased opening. Calcified aortic valve with decreased opening. The aortic valve area is approximately 1.55 sqcm, by the continuity equation, which is consistent with mild aortic stenosis. Mild-to-moderate aortic regurgitation. No echocardiographic evidence of pulmonary hypertension. Mild tricuspid regurgitation. PASP= 34 mmHg. Trace pericardial effusion. [de-identified] : Cardiac catheterization performed on 5/18/2022: There is a 70 % stenosis of the first diagonal. Mid left anterior descending artery has a 90 % stenosis. Proximal circumflex has a 40 % stenosis. Distal right coronary artery has an 80 % stenosis within the\par  stented segment. \par  \par  5/18/2022: Balloon angioplasty of the first diagonal. \par  2.75 x 15 SAPPHIRE Drug Eluting Stent placed to the mid LAD. \par  \par  5/19/2022: \par  3.5 x 16 SYNERGY LEONCIO placed to the distal right coronary artery. [___] : [unfilled] [LVEF ___%] : LVEF [unfilled]% [None] : no pulmonary hypertension

## 2023-09-06 NOTE — DISCUSSION/SUMMARY
[FreeTextEntry1] : IMPRESSION: Ms. GUILLORY is a 68 year old woman with a history of coronary artery disease status post stents to the OM1 and LAD 1/2014 and status post LEONCIO to the mid LAD and and distal RCA 5/2022, HTN, hyperlipidemia, diet- controlled Diabetes mellitus, breast cancer status [place] [facility] [place], and family history of coronary artery disease who presents today for follow up of coronary artery disease, hypertension, and hyperlipidemia.  PLAN: 1. She is status post two stents placed about 16 months ago.   She will continue on ASA 81 mg daily and Plavix 75 mg daily.  She was in sinus bradycardia on her ECG that was performed today.  2. Her blood pressure is good. She has only had 2 elevated blood pressure readings since I last saw her about 3 weeks ago. She will continue on Toprol XL 25 mg daily and will continue to follow her blood pressure at home. If her blood pressure trends up, then she will take Amlodipine 2.5 mg daily.  3. Her goal LDL is less than 70. Her LDL most recently was OK. She will continue on Lipitor 80 mg daily and Zetia 10 mg daily.  4. She will follow up with me in 2 months or sooner should she experience any symptoms in the interim or should her blood pressure be elevated at home. [EKG obtained to assist in diagnosis and management of assessed problem(s)] : EKG obtained to assist in diagnosis and management of assessed problem(s)

## 2023-09-06 NOTE — DISCUSSION/SUMMARY
[FreeTextEntry1] : IMPRESSION: Ms. GUILLORY is a 68 year old woman with a history of coronary artery disease status post stents to the OM1 and LAD 1/2014 and status post LEONCIO to the mid LAD and and distal RCA 5/2022, HTN, hyperlipidemia, diet- controlled Diabetes mellitus, breast cancer status post lumpectomy 5/23/2023 at [facility] [place], and family history of coronary artery disease who presents today for follow up of coronary artery disease, hypertension, and hyperlipidemia.  PLAN: 1. She is status post two stents placed about 15 months ago.   She will continue on ASA 81 mg daily and Plavix 75 mg daily. She was in sinus rhythm on her ECG that was performed today. 2. Her blood pressure was good. Given that she will be starting on anastrozole and she had tachycardia while she was hospitalized, I have asked her to restart on Toprol-XL 25 mg daily for her blood pressure.   She will notify me should her blood pressure be elevated.  She will continue to follow her blood pressure at home. 3. Her goal LDL is less than 70. Her LDL most recently was above goal, but improved. She will continue on Lipitor 80 mg daily and Zetia 10 mg daily.  4. She will follow up with me in 3 weeks for follow up of her blood pressure or sooner should she experience any symptoms in the interim. [EKG obtained to assist in diagnosis and management of assessed problem(s)] : EKG obtained to assist in diagnosis and management of assessed problem(s)

## 2023-09-06 NOTE — HISTORY OF PRESENT ILLNESS
[FreeTextEntry1] : Patient is a 68 year old woman with a history of coronary artery disease status post stents to the OM1 and LAD 1/2014 and status post LEONCIO to the mid LAD and and distal RCA 5/2022, HTN, hyperlipidemia, diet- controlled Diabetes mellitus, [place] [facility] [place] and [place] history of coronary artery disease who presents today for follow up of coronary artery disease and HTN. She had two rounds of chemo, July 5th and July 27th. After the first chemo she was hospitalized at [facility] for neutropenia and was given a course of Cefepime. She was then hospitalized on August 4th for abdominal pain and C.diff. On August 7th, while she was in the hospital, she developed acute SOB and CP, but MI and PE were ruled out. At that time she was tachycardic in the 150s and her EKG showed a LBBB. Her symptoms and the LBBB resolved on its own. Her BP medicine was stopped during the hospitalization due to hypotension. Last visit she started on Metoprolol 25 mg daily and her blood pressure has been good aside from two systolic blood pressure readings of 150 mmHg. She has been taking Anastrazole since 8/26 and will soon be starting on radiation therapy. There are no plans at this time for more chemotherapy. She otherwise denies any exertional chest pain, dyspnea at rest, palpitations, headaches, and dizziness.

## 2023-09-06 NOTE — HISTORY OF PRESENT ILLNESS
[FreeTextEntry1] : Patient is a 68 year old woman with a history of coronary artery disease status post stents to the OM1 and LAD 1/2014 and status post LEONCIO to the mid LAD and and distal RCA 5/2022, HTN, hyperlipidemia, diet- controlled Diabetes mellitus, breast cancer status post lumpectomy 5/23/2023 at [facility] [place], and family history of coronary artery disease who presents today for follow up of coronary artery disease and after hospitalization. She was hospitalized twice at [facility] over the past month. She had two rounds of chemo, July 5th and July 27th. After the first chemo she was hospitalized at [facility] for neutropenia and was given a course of Cefepime. She was then hospitalized on August 4th for abdominal pain and C.diff. On August 7th, while she was in the hospital, she developed acute SOB and CP, as per her daughter she had an echocardiogram, troponin, and CT scan performed and MI and PE were ruled out. At that time she was tachycardic in the 150s and her EKG showed a LBBB. Her symptoms and the LBBB resolved on its own. Her antihypertensive medications were stopped during the hospitalization due to hypotension. She has only been taking Atorvastatin, Aspirin and Plavix. She has not taken any of her blood pressure medications since she was in the hospital. At home her BP has been 120-130/60-70.  She followed up with her [facility] doctor earlier today, they will stop chemo and start her on radiation and Anastrazole. She otherwise denies any exertional chest pain, dyspnea at rest, palpitations, headaches, and dizziness.

## 2023-09-06 NOTE — CARDIOLOGY SUMMARY
[___] : [unfilled] [LVEF ___%] : LVEF [unfilled]% [None] : no pulmonary hypertension [de-identified] : Pharmacologic Nuclear Stress Test performed on 4/21/2022: There are large, mild defects in the anterior, basal to mid anteroseptal, and basal to mid anterolateral walls that are reversible suggestive of ischemia. There are large, mild to moderate defects in the\par  inferoseptal, distal inferior, inferoapical, and apical lateral walls that are mostly reversible suggestive of ischemia with partial scarring. LVEF= 66%, revealing hypokinesis of the basal septal wall and reduced systolic thickening of the distal inferior, inferoapical, and apical lateral walls. [de-identified] : 8/16/2023: Sinus Rhythm at 72 bpm with poor R wave progression  [de-identified] : 8/4/2023: Endocardium not well visualized; grossly preserved left ventricular ejection fraction with hypokinesis of the distal inferior wall. EF is approximately 60-65%. Mild eccentric left ventricular hypertrophy. Mild (grade 1) left ventricular diastolic dysfunction. Normal right ventricular cavity size and normal right ventricular systolic function. Mild biatrial enlargement. There is mild mitral valve stenosis. Mild mitral regurgitation. Mitral annular calcification and calcified mitral valve leaflets with decreased opening. Calcified aortic valve with decreased opening. The aortic valve area is approximately 1.55 sqcm, by the continuity equation, which is consistent with mild aortic stenosis. Mild-to-moderate aortic regurgitation. No echocardiographic evidence of pulmonary hypertension. Mild tricuspid regurgitation.  Estimated pulmonary artery systolic pressure is 34 mmHg. Trace pericardial effusion [de-identified] : Cardiac catheterization performed on 5/18/2022: There is a 70 % stenosis of the first diagonal. Mid left anterior descending artery has a 90 % stenosis. Proximal circumflex has a 40 % stenosis. Distal right coronary artery has an 80 % stenosis within the\par  stented segment. \par  \par  5/18/2022: Balloon angioplasty of the first diagonal. \par  2.75 x 15 SAPPHIRE Drug Eluting Stent placed to the mid LAD. \par  \par  5/19/2022: \par  3.5 x 16 SYNERGY LEONCIO placed to the distal right coronary artery.

## 2023-10-03 ENCOUNTER — APPOINTMENT (OUTPATIENT)
Dept: CARDIOLOGY | Facility: CLINIC | Age: 68
End: 2023-10-03
Payer: COMMERCIAL

## 2023-10-03 ENCOUNTER — NON-APPOINTMENT (OUTPATIENT)
Age: 68
End: 2023-10-03

## 2023-10-03 VITALS
WEIGHT: 188 LBS | HEART RATE: 52 BPM | RESPIRATION RATE: 14 BRPM | BODY MASS INDEX: 34.6 KG/M2 | SYSTOLIC BLOOD PRESSURE: 150 MMHG | DIASTOLIC BLOOD PRESSURE: 77 MMHG | HEIGHT: 62 IN | OXYGEN SATURATION: 100 %

## 2023-10-03 PROCEDURE — 99214 OFFICE O/P EST MOD 30 MIN: CPT | Mod: 25

## 2023-10-03 PROCEDURE — 93000 ELECTROCARDIOGRAM COMPLETE: CPT

## 2023-11-01 ENCOUNTER — APPOINTMENT (OUTPATIENT)
Dept: CARDIOLOGY | Facility: CLINIC | Age: 68
End: 2023-11-01
Payer: COMMERCIAL

## 2023-11-01 ENCOUNTER — NON-APPOINTMENT (OUTPATIENT)
Age: 68
End: 2023-11-01

## 2023-11-01 VITALS
WEIGHT: 187 LBS | BODY MASS INDEX: 34.41 KG/M2 | HEIGHT: 62 IN | SYSTOLIC BLOOD PRESSURE: 147 MMHG | OXYGEN SATURATION: 99 % | RESPIRATION RATE: 14 BRPM | DIASTOLIC BLOOD PRESSURE: 79 MMHG | HEART RATE: 52 BPM

## 2023-11-01 PROCEDURE — 99214 OFFICE O/P EST MOD 30 MIN: CPT | Mod: 25

## 2023-11-01 PROCEDURE — 93000 ELECTROCARDIOGRAM COMPLETE: CPT

## 2023-11-01 RX ORDER — AMLODIPINE BESYLATE 2.5 MG/1
2.5 TABLET ORAL
Qty: 270 | Refills: 0 | Status: ACTIVE | COMMUNITY
Start: 2023-09-06 | End: 1900-01-01

## 2023-11-30 ENCOUNTER — NON-APPOINTMENT (OUTPATIENT)
Age: 68
End: 2023-11-30

## 2023-11-30 ENCOUNTER — APPOINTMENT (OUTPATIENT)
Dept: CARDIOLOGY | Facility: CLINIC | Age: 68
End: 2023-11-30
Payer: COMMERCIAL

## 2023-11-30 VITALS — DIASTOLIC BLOOD PRESSURE: 60 MMHG | SYSTOLIC BLOOD PRESSURE: 132 MMHG

## 2023-11-30 VITALS
HEIGHT: 62 IN | DIASTOLIC BLOOD PRESSURE: 76 MMHG | BODY MASS INDEX: 35.15 KG/M2 | HEART RATE: 66 BPM | OXYGEN SATURATION: 97 % | RESPIRATION RATE: 12 BRPM | SYSTOLIC BLOOD PRESSURE: 144 MMHG | WEIGHT: 191 LBS

## 2023-11-30 LAB
25(OH)D3 SERPL-MCNC: 19.3 NG/ML
ALBUMIN SERPL ELPH-MCNC: 4.1 G/DL
ALP BLD-CCNC: 94 U/L
ALT SERPL-CCNC: 13 U/L
ANION GAP SERPL CALC-SCNC: 12 MMOL/L
AST SERPL-CCNC: 17 U/L
BASOPHILS # BLD AUTO: 0.02 K/UL
BASOPHILS NFR BLD AUTO: 0.5 %
BILIRUB SERPL-MCNC: 0.4 MG/DL
BUN SERPL-MCNC: 12 MG/DL
CALCIUM SERPL-MCNC: 9.7 MG/DL
CHLORIDE SERPL-SCNC: 107 MMOL/L
CHOLEST SERPL-MCNC: 143 MG/DL
CO2 SERPL-SCNC: 25 MMOL/L
CREAT SERPL-MCNC: 0.65 MG/DL
EGFR: 96 ML/MIN/1.73M2
EOSINOPHIL # BLD AUTO: 0.19 K/UL
EOSINOPHIL NFR BLD AUTO: 5.1 %
ESTIMATED AVERAGE GLUCOSE: 128 MG/DL
GLUCOSE SERPL-MCNC: 111 MG/DL
HBA1C MFR BLD HPLC: 6.1 %
HCT VFR BLD CALC: 32.1 %
HDLC SERPL-MCNC: 44 MG/DL
HGB BLD-MCNC: 9.7 G/DL
IMM GRANULOCYTES NFR BLD AUTO: 0.3 %
LDLC SERPL CALC-MCNC: 72 MG/DL
LYMPHOCYTES # BLD AUTO: 0.68 K/UL
LYMPHOCYTES NFR BLD AUTO: 18.2 %
MAN DIFF?: NORMAL
MCHC RBC-ENTMCNC: 25.5 PG
MCHC RBC-ENTMCNC: 30.2 GM/DL
MCV RBC AUTO: 84.5 FL
MONOCYTES # BLD AUTO: 0.31 K/UL
MONOCYTES NFR BLD AUTO: 8.3 %
NEUTROPHILS # BLD AUTO: 2.53 K/UL
NEUTROPHILS NFR BLD AUTO: 67.6 %
NONHDLC SERPL-MCNC: 100 MG/DL
PLATELET # BLD AUTO: 221 K/UL
POTASSIUM SERPL-SCNC: 4.1 MMOL/L
PROT SERPL-MCNC: 6.9 G/DL
RBC # BLD: 3.8 M/UL
RBC # FLD: 15.9 %
SODIUM SERPL-SCNC: 144 MMOL/L
TRIGL SERPL-MCNC: 161 MG/DL
TSH SERPL-ACNC: 1.47 UIU/ML
WBC # FLD AUTO: 3.74 K/UL

## 2023-11-30 PROCEDURE — 93000 ELECTROCARDIOGRAM COMPLETE: CPT

## 2023-11-30 PROCEDURE — 99214 OFFICE O/P EST MOD 30 MIN: CPT | Mod: 25

## 2024-05-15 ENCOUNTER — NON-APPOINTMENT (OUTPATIENT)
Age: 69
End: 2024-05-15

## 2024-05-15 ENCOUNTER — APPOINTMENT (OUTPATIENT)
Dept: CARDIOLOGY | Facility: CLINIC | Age: 69
End: 2024-05-15
Payer: COMMERCIAL

## 2024-05-15 VITALS
RESPIRATION RATE: 12 BRPM | WEIGHT: 188 LBS | SYSTOLIC BLOOD PRESSURE: 128 MMHG | OXYGEN SATURATION: 99 % | BODY MASS INDEX: 34.6 KG/M2 | HEIGHT: 62 IN | HEART RATE: 51 BPM | DIASTOLIC BLOOD PRESSURE: 60 MMHG

## 2024-05-15 DIAGNOSIS — I10 ESSENTIAL (PRIMARY) HYPERTENSION: ICD-10-CM

## 2024-05-15 DIAGNOSIS — I25.10 ATHEROSCLEROTIC HEART DISEASE OF NATIVE CORONARY ARTERY W/OUT ANGINA PECTORIS: ICD-10-CM

## 2024-05-15 PROCEDURE — G2211 COMPLEX E/M VISIT ADD ON: CPT | Mod: NC,1L

## 2024-05-15 PROCEDURE — 99214 OFFICE O/P EST MOD 30 MIN: CPT | Mod: 25

## 2024-05-15 PROCEDURE — 93000 ELECTROCARDIOGRAM COMPLETE: CPT

## 2024-05-20 LAB
25(OH)D3 SERPL-MCNC: 26.6 NG/ML
ALBUMIN SERPL ELPH-MCNC: 4.1 G/DL
ALP BLD-CCNC: 109 U/L
ALT SERPL-CCNC: 22 U/L
ANION GAP SERPL CALC-SCNC: 20 MMOL/L
AST SERPL-CCNC: 37 U/L
BASOPHILS # BLD AUTO: 0.02 K/UL
BASOPHILS NFR BLD AUTO: 0.5 %
BILIRUB SERPL-MCNC: 0.2 MG/DL
BUN SERPL-MCNC: 12 MG/DL
CALCIUM SERPL-MCNC: 9 MG/DL
CHLORIDE SERPL-SCNC: 104 MMOL/L
CHOLEST SERPL-MCNC: 188 MG/DL
CO2 SERPL-SCNC: 18 MMOL/L
CREAT SERPL-MCNC: 0.71 MG/DL
EGFR: 92 ML/MIN/1.73M2
EOSINOPHIL # BLD AUTO: 0.2 K/UL
EOSINOPHIL NFR BLD AUTO: 5 %
ESTIMATED AVERAGE GLUCOSE: 143 MG/DL
FOLATE SERPL-MCNC: 18.2 NG/ML
GLUCOSE SERPL-MCNC: 88 MG/DL
HBA1C MFR BLD HPLC: 6.6 %
HCT VFR BLD CALC: 35.9 %
HDLC SERPL-MCNC: 45 MG/DL
HGB BLD-MCNC: 11 G/DL
IMM GRANULOCYTES NFR BLD AUTO: 0.3 %
LDLC SERPL CALC-MCNC: 115 MG/DL
LYMPHOCYTES # BLD AUTO: 1.15 K/UL
LYMPHOCYTES NFR BLD AUTO: 28.8 %
MAN DIFF?: NORMAL
MCHC RBC-ENTMCNC: 27.2 PG
MCHC RBC-ENTMCNC: 30.6 GM/DL
MCV RBC AUTO: 88.6 FL
MONOCYTES # BLD AUTO: 0.58 K/UL
MONOCYTES NFR BLD AUTO: 14.5 %
NEUTROPHILS # BLD AUTO: 2.04 K/UL
NEUTROPHILS NFR BLD AUTO: 50.9 %
NONHDLC SERPL-MCNC: 143 MG/DL
PLATELET # BLD AUTO: 207 K/UL
POTASSIUM SERPL-SCNC: 4.4 MMOL/L
PROT SERPL-MCNC: 7.4 G/DL
RBC # BLD: 4.05 M/UL
RBC # FLD: 15.9 %
SODIUM SERPL-SCNC: 141 MMOL/L
TRIGL SERPL-MCNC: 154 MG/DL
TSH SERPL-ACNC: 2.62 UIU/ML
VIT B12 SERPL-MCNC: 351 PG/ML
WBC # FLD AUTO: 4 K/UL

## 2024-05-31 NOTE — HISTORY OF PRESENT ILLNESS
[FreeTextEntry1] : Patient is a 69 year old woman with a history of coronary artery disease status post stents to the OM1 and LAD 1/2014 and status post LEONCIO to the mid LAD and and distal RCA 5/2022, HTN, hyperlipidemia, diet- controlled Diabetes mellitus, breast cancer status post lumpectomy 5/23/2023 at [facility] [place], and family history of coronary artery disease who presents today for follow up of coronary artery disease and HTN and risk stratification prior to tooth extraction. She followed up with her oncologist last week and states everything is good. At home, her blood pressure has been acceptable, 130-135/75-80 mmHg. She has been watching her diet. She had one elevated reading one day last month at which time she had a headache, and her blood pressure was 145/89. She has arthritic discomfort in her hands since taking the hormone therapy for cancer. She has otherwise been feeling well, denying any chest pain, shortness of breath, palpitations, headaches or dizziness.

## 2024-05-31 NOTE — CARDIOLOGY SUMMARY
[___] : [unfilled] [LVEF ___%] : LVEF [unfilled]% [None] : no pulmonary hypertension [de-identified] : 5/15/024: Sinus bradycardia at 50 bpm with poor R wave progression [de-identified] : Pharmacologic Nuclear Stress Test performed on 4/21/2022: There are large, mild defects in the anterior, basal to mid anteroseptal, and basal to mid anterolateral walls that are reversible suggestive of ischemia. There are large, mild to moderate defects in the\par  inferoseptal, distal inferior, inferoapical, and apical lateral walls that are mostly reversible suggestive of ischemia with partial scarring. LVEF= 66%, revealing hypokinesis of the basal septal wall and reduced systolic thickening of the distal inferior, inferoapical, and apical lateral walls. [de-identified] : 8/4/2023: Endocardium not well visualized; grossly preserved left ventricular ejection fraction with hypokinesis of  the distal inferior wall. EF is approximately 60-65%. Mild eccentric left ventricular hypertrophy. Mild (grade 1) left ventricular diastolic dysfunction. Normal right ventricular cavity size and normal right ventricular systolic function. Mild biatrial enlargement. Mild mitral stenosis. Mild mitral regurgitation. Mitral annular calcification and calcified mitral valve leaflets with decreased opening. Calcified aortic valve with decreased opening. The aortic valve area is approximately 1.55 sqcm, by the continuity equation, which is consistent with mild aortic stenosis. Mild-to-moderate aortic regurgitation. No echocardiographic evidence of pulmonary hypertension. Mild tricuspid regurgitation. PASP= 34 mmHg. Trace pericardial effusion. [de-identified] : Cardiac catheterization performed on 5/18/2022: There is a 70 % stenosis of the first diagonal. Mid left anterior descending artery has a 90 % stenosis. Proximal circumflex has a 40 % stenosis. Distal right coronary artery has an 80 % stenosis within the\par  stented segment. \par  \par  5/18/2022: Balloon angioplasty of the first diagonal. \par  2.75 x 15 SAPPHIRE Drug Eluting Stent placed to the mid LAD. \par  \par  5/19/2022: \par  3.5 x 16 SYNERGY LEONCIO placed to the distal right coronary artery.

## 2024-05-31 NOTE — PHYSICAL EXAM
[General Appearance - Well Developed] : well developed [Normal Appearance] : normal appearance [Well Groomed] : well groomed [General Appearance - Well Nourished] : well nourished [No Deformities] : no deformities [General Appearance - In No Acute Distress] : no acute distress [Normal Conjunctiva] : the conjunctiva exhibited no abnormalities [Normal Jugular Venous A Waves Present] : normal jugular venous A waves present [Normal Jugular Venous V Waves Present] : normal jugular venous V waves present [No Jugular Venous Clemons A Waves] : no jugular venous clemons A waves [Respiration, Rhythm And Depth] : normal respiratory rhythm and effort [Auscultation Breath Sounds / Voice Sounds] : lungs were clear to auscultation bilaterally [Exaggerated Use Of Accessory Muscles For Inspiration] : no accessory muscle use [Bowel Sounds] : normal bowel sounds [Abdomen Soft] : soft [Abdomen Tenderness] : non-tender [Abnormal Walk] : normal gait [Nail Clubbing] : no clubbing of the fingernails [Cyanosis, Localized] : no localized cyanosis [Petechial Hemorrhages (___cm)] : no petechial hemorrhages [Skin Color & Pigmentation] : normal skin color and pigmentation [] : no rash [No Skin Ulcers] : no skin ulcer [Oriented To Time, Place, And Person] : oriented to person, place, and time [Affect] : the affect was normal [Mood] : the mood was normal [No Anxiety] : not feeling anxious [Bradycardia] : bradycardic [Rhythm Regular] : regular [Normal S1] : normal S1 [Normal S2] : normal S2 [I] : a grade 1 [II] : a grade 2 [No Pitting Edema] : no pitting edema present [FreeTextEntry1] : She was wearing a face mask during the examination.  [S3] : no S3 [Left Carotid Bruit] : no bruit heard over the left carotid [Right Carotid Bruit] : no bruit heard over the right carotid [Bruit] : no bruit heard

## 2024-05-31 NOTE — ADDENDUM
[FreeTextEntry1] : Addendum created on 5/31/2024: Informed by patient that she will be going for dental extraction.  She has intermediate clinical risk predictors for a low risk procedure.  She may proceed with her dental extraction from the cardiac standpoint without any further workup.  She will continue on aspirin without any interruption.  Should it be necessary for her to hold Plavix she can hold it for 3 days prior to her dental extraction.

## 2024-09-24 ENCOUNTER — NON-APPOINTMENT (OUTPATIENT)
Age: 69
End: 2024-09-24

## 2024-09-24 ENCOUNTER — APPOINTMENT (OUTPATIENT)
Dept: CARDIOLOGY | Facility: CLINIC | Age: 69
End: 2024-09-24
Payer: COMMERCIAL

## 2024-09-24 VITALS
RESPIRATION RATE: 12 BRPM | OXYGEN SATURATION: 99 % | BODY MASS INDEX: 33.49 KG/M2 | SYSTOLIC BLOOD PRESSURE: 126 MMHG | WEIGHT: 182 LBS | HEART RATE: 57 BPM | HEIGHT: 62 IN | DIASTOLIC BLOOD PRESSURE: 70 MMHG

## 2024-09-24 DIAGNOSIS — I25.10 ATHEROSCLEROTIC HEART DISEASE OF NATIVE CORONARY ARTERY W/OUT ANGINA PECTORIS: ICD-10-CM

## 2024-09-24 DIAGNOSIS — I35.9 NONRHEUMATIC AORTIC VALVE DISORDER, UNSPECIFIED: ICD-10-CM

## 2024-09-24 PROCEDURE — G2211 COMPLEX E/M VISIT ADD ON: CPT | Mod: NC

## 2024-09-24 PROCEDURE — 93000 ELECTROCARDIOGRAM COMPLETE: CPT

## 2024-09-24 PROCEDURE — 99214 OFFICE O/P EST MOD 30 MIN: CPT

## 2024-09-29 LAB
25(OH)D3 SERPL-MCNC: 28.5 NG/ML
ALBUMIN SERPL ELPH-MCNC: 4.2 G/DL
ALP BLD-CCNC: 108 U/L
ALT SERPL-CCNC: 11 U/L
ANION GAP SERPL CALC-SCNC: 16 MMOL/L
AST SERPL-CCNC: 18 U/L
BILIRUB SERPL-MCNC: 0.3 MG/DL
BUN SERPL-MCNC: 13 MG/DL
CALCIUM SERPL-MCNC: 9.6 MG/DL
CHLORIDE SERPL-SCNC: 105 MMOL/L
CO2 SERPL-SCNC: 22 MMOL/L
CREAT SERPL-MCNC: 0.79 MG/DL
EGFR: 81 ML/MIN/1.73M2
FOLATE SERPL-MCNC: 11.6 NG/ML
GLUCOSE SERPL-MCNC: 109 MG/DL
POTASSIUM SERPL-SCNC: 4.1 MMOL/L
PROT SERPL-MCNC: 7.5 G/DL
SODIUM SERPL-SCNC: 142 MMOL/L
VIT B12 SERPL-MCNC: 439 PG/ML

## 2024-09-29 NOTE — DISCUSSION/SUMMARY
[EKG obtained to assist in diagnosis and management of assessed problem(s)] : EKG obtained to assist in diagnosis and management of assessed problem(s) [FreeTextEntry1] : IMPRESSION: Ms. GUILLORY is a 69 year old woman with a history of coronary artery disease status post stents to the OM1 and LAD 1/2014 and status post LEONCIO to the mid LAD and and distal RCA 5/2022, HTN, hyperlipidemia, diet- controlled Diabetes mellitus, breast cancer status [place] [facility] [place] and family history of coronary artery disease who presents today for follow up of coronary artery disease, hyperlipidemia, and hypertension.  PLAN: 1. She is status post two stents placed a little over 2 years ago.  She will continue on ASA 81 mg daily and Plavix 75 mg daily.  She was in sinus bradycardia on her ECG that was performed today.  2. Her blood pressure is good at this time. She will continue to follow her blood pressure at home.  Her blood pressure has been okay when she has checked it at home. I have not made any changes at this time and she will continue on Amlodipine 7.5 mg daily and Toprol XL 25 mg daily. 3. Her goal LDL is less than 70. Her LDL most recently triglycerides and total cholesterol were elevated. She will continue on Lipitor 80 mg daily and Zetia 10 mg daily.  I will be checking a lipid panel and CMP today. 4. She will follow up with me in 4 months or sooner should she experience any symptoms in the interim or should her blood pressure be elevated at home. 5. I have also asked her to schedule an echocardiogram given her aortic valve disorder.

## 2024-09-29 NOTE — CARDIOLOGY SUMMARY
[___] : [unfilled] [LVEF ___%] : LVEF [unfilled]% [None] : no pulmonary hypertension [___] : [unfilled] [de-identified] : 9/24/024: Sinus bradycardia at 59 bpm [de-identified] : Pharmacologic Nuclear Stress Test performed on 4/21/2022: There are large, mild defects in the anterior, basal to mid anteroseptal, and basal to mid anterolateral walls that are reversible suggestive of ischemia. There are large, mild to moderate defects in the\par  inferoseptal, distal inferior, inferoapical, and apical lateral walls that are mostly reversible suggestive of ischemia with partial scarring. LVEF= 66%, revealing hypokinesis of the basal septal wall and reduced systolic thickening of the distal inferior, inferoapical, and apical lateral walls. [de-identified] : 8/4/2023: Endocardium not well visualized; grossly preserved left ventricular ejection fraction with hypokinesis of  the distal inferior wall. EF is approximately 60-65%. Mild eccentric left ventricular hypertrophy. Mild (grade 1) left ventricular diastolic dysfunction. Normal right ventricular cavity size and normal right ventricular systolic function. Mild biatrial enlargement. Mild mitral stenosis. Mild mitral regurgitation. Mitral annular calcification and calcified mitral valve leaflets with decreased opening. Calcified aortic valve with decreased opening. The aortic valve area is approximately 1.55 sqcm, by the continuity equation, which is consistent with mild aortic stenosis. Mild-to-moderate aortic regurgitation. No echocardiographic evidence of pulmonary hypertension. Mild tricuspid regurgitation. PASP= 34 mmHg. Trace pericardial effusion. [de-identified] : Cardiac catheterization performed on 5/18/2022: There is a 70 % stenosis of the first diagonal. Mid left anterior descending artery has a 90 % stenosis. Proximal circumflex has a 40 % stenosis. Distal right coronary artery has an 80 % stenosis within the\par  stented segment. \par  \par  5/18/2022: Balloon angioplasty of the first diagonal. \par  2.75 x 15 SAPPHIRE Drug Eluting Stent placed to the mid LAD. \par  \par  5/19/2022: \par  3.5 x 16 SYNERGY LEONCIO placed to the distal right coronary artery.

## 2024-09-29 NOTE — DISCUSSION/SUMMARY
[EKG obtained to assist in diagnosis and management of assessed problem(s)] : EKG obtained to assist in diagnosis and management of assessed problem(s) [FreeTextEntry1] : IMPRESSION: Ms. GUILLORY is a 69 year old woman with a history of coronary artery disease status post stents to the OM1 and LAD 1/2014 and status post LEONCIO to the mid LAD and and distal RCA 5/2022, HTN, hyperlipidemia, diet- controlled Diabetes mellitus, [place] [facility] [place] history of coronary artery disease who presents today for follow up of coronary artery disease, hyperlipidemia, and hypertension.  PLAN: 1. She is status post two stents placed a little over 2 years ago.  She will continue on ASA 81 mg daily and Plavix 75 mg daily.  She was in sinus bradycardia on her ECG that was performed today.  2. Her blood pressure is good at this time. She will continue to follow her blood pressure at home.  Her blood pressure has been okay when she has checked it at home. I have not made any changes at this time and she will continue on Amlodipine 7.5 mg daily and Toprol XL 25 mg daily. 3. Her goal LDL is less than 70. Her LDL most recently triglycerides and total cholesterol were elevated. She will continue on Lipitor 80 mg daily and Zetia 10 mg daily.  I will be checking a lipid panel and CMP today. 4. She will follow up with me in 4 months or sooner should she experience any symptoms in the interim or should her blood pressure be elevated at home. 5. I have also asked her to schedule an echocardiogram given her aortic valve disorder.

## 2024-09-29 NOTE — DISCUSSION/SUMMARY
[EKG obtained to assist in diagnosis and management of assessed problem(s)] : EKG obtained to assist in diagnosis and management of assessed problem(s) [FreeTextEntry1] : IMPRESSION: Ms. GUILLORY is a 69 year old woman with a history of coronary artery disease status post stents to the OM1 and LAD 1/2014 and status post LEONCIO to the mid LAD and and distal RCA 5/2022, HTN, hyperlipidemia, diet- controlled Diabetes mellitus, breast cancer status post lum[place] 5/23/2023 [place] [facility] [place], and family history of coronary artery disease who presents today for follow up of coronary artery disease, hyperlipidemia, and hypertension.  PLAN: 1. She is status post two stents placed a little over 2 years ago.  She will continue on ASA 81 mg daily and Plavix 75 mg daily.  She was in sinus bradycardia on her ECG that was performed today.  2. Her blood pressure is good at this time. She will continue to follow her blood pressure at home.  Her blood pressure has been okay when she has checked it at home. I have not made any changes at this time and she will continue on Amlodipine 7.5 mg daily and Toprol XL 25 mg daily. 3. Her goal LDL is less than 70. Her LDL most recently triglycerides and total cholesterol were elevated. She will continue on Lipitor 80 mg daily and Zetia 10 mg daily.  I will be checking a lipid panel and CMP today. 4. She will follow up with me in 4 months or sooner should she experience any symptoms in the interim or should her blood pressure be elevated at home. 5. I have also asked her to schedule an echocardiogram given her aortic valve disorder.

## 2024-09-29 NOTE — CARDIOLOGY SUMMARY
[___] : [unfilled] [LVEF ___%] : LVEF [unfilled]% [None] : no pulmonary hypertension [___] : [unfilled] [de-identified] : 9/24/024: Sinus bradycardia at 59 bpm [de-identified] : Pharmacologic Nuclear Stress Test performed on 4/21/2022: There are large, mild defects in the anterior, basal to mid anteroseptal, and basal to mid anterolateral walls that are reversible suggestive of ischemia. There are large, mild to moderate defects in the\par  inferoseptal, distal inferior, inferoapical, and apical lateral walls that are mostly reversible suggestive of ischemia with partial scarring. LVEF= 66%, revealing hypokinesis of the basal septal wall and reduced systolic thickening of the distal inferior, inferoapical, and apical lateral walls. [de-identified] : 8/4/2023: Endocardium not well visualized; grossly preserved left ventricular ejection fraction with hypokinesis of  the distal inferior wall. EF is approximately 60-65%. Mild eccentric left ventricular hypertrophy. Mild (grade 1) left ventricular diastolic dysfunction. Normal right ventricular cavity size and normal right ventricular systolic function. Mild biatrial enlargement. Mild mitral stenosis. Mild mitral regurgitation. Mitral annular calcification and calcified mitral valve leaflets with decreased opening. Calcified aortic valve with decreased opening. The aortic valve area is approximately 1.55 sqcm, by the continuity equation, which is consistent with mild aortic stenosis. Mild-to-moderate aortic regurgitation. No echocardiographic evidence of pulmonary hypertension. Mild tricuspid regurgitation. PASP= 34 mmHg. Trace pericardial effusion. [de-identified] : Cardiac catheterization performed on 5/18/2022: There is a 70 % stenosis of the first diagonal. Mid left anterior descending artery has a 90 % stenosis. Proximal circumflex has a 40 % stenosis. Distal right coronary artery has an 80 % stenosis within the\par  stented segment. \par  \par  5/18/2022: Balloon angioplasty of the first diagonal. \par  2.75 x 15 SAPPHIRE Drug Eluting Stent placed to the mid LAD. \par  \par  5/19/2022: \par  3.5 x 16 SYNERGY LEONCIO placed to the distal right coronary artery.

## 2024-09-29 NOTE — HISTORY OF PRESENT ILLNESS
[FreeTextEntry1] : Patient is a 69 year old woman with a history of coronary artery disease status post stents to the OM1 and LAD 1/2014 and status post LEONCIO to the mid LAD and distal RCA 5/2022, HTN, hyperlipidemia, diet- controlled Diabetes mellitus, breast cancer status [place]23/[place]23 [place] [facility] [place] and family history of coronary artery disease who presents today for follow up of coronary artery disease, hyperlipidemia, and HTN. She states that her blood pressure has been OK at home. She has been watching her diet and does not understand why her triglycerides were recently elevated. She states that she had an elevated blood pressure reading earlier this week of 148/80 and had a headache, but has otherwise been good. She has arthritic discomfort in her hands since taking the hormone therapy for cancer. She otherwise denies any chest pain, shortness of breath, palpitations, or dizziness. She does state that she feels weak at times and is wondering if her vitamin levels are low. Two weeks ago, her triglycerides were 252 and her total cholesterol was 221.

## 2024-09-29 NOTE — CARDIOLOGY SUMMARY
[___] : [unfilled] [LVEF ___%] : LVEF [unfilled]% [None] : no pulmonary hypertension [___] : [unfilled] [de-identified] : 9/24/024: Sinus bradycardia at 59 bpm [de-identified] : Pharmacologic Nuclear Stress Test performed on 4/21/2022: There are large, mild defects in the anterior, basal to mid anteroseptal, and basal to mid anterolateral walls that are reversible suggestive of ischemia. There are large, mild to moderate defects in the\par  inferoseptal, distal inferior, inferoapical, and apical lateral walls that are mostly reversible suggestive of ischemia with partial scarring. LVEF= 66%, revealing hypokinesis of the basal septal wall and reduced systolic thickening of the distal inferior, inferoapical, and apical lateral walls. [de-identified] : 8/4/2023: Endocardium not well visualized; grossly preserved left ventricular ejection fraction with hypokinesis of  the distal inferior wall. EF is approximately 60-65%. Mild eccentric left ventricular hypertrophy. Mild (grade 1) left ventricular diastolic dysfunction. Normal right ventricular cavity size and normal right ventricular systolic function. Mild biatrial enlargement. Mild mitral stenosis. Mild mitral regurgitation. Mitral annular calcification and calcified mitral valve leaflets with decreased opening. Calcified aortic valve with decreased opening. The aortic valve area is approximately 1.55 sqcm, by the continuity equation, which is consistent with mild aortic stenosis. Mild-to-moderate aortic regurgitation. No echocardiographic evidence of pulmonary hypertension. Mild tricuspid regurgitation. PASP= 34 mmHg. Trace pericardial effusion. [de-identified] : Cardiac catheterization performed on 5/18/2022: There is a 70 % stenosis of the first diagonal. Mid left anterior descending artery has a 90 % stenosis. Proximal circumflex has a 40 % stenosis. Distal right coronary artery has an 80 % stenosis within the\par  stented segment. \par  \par  5/18/2022: Balloon angioplasty of the first diagonal. \par  2.75 x 15 SAPPHIRE Drug Eluting Stent placed to the mid LAD. \par  \par  5/19/2022: \par  3.5 x 16 SYNERGY LEONCIO placed to the distal right coronary artery.

## 2024-09-29 NOTE — PHYSICAL EXAM
[General Appearance - Well Developed] : well developed [Normal Appearance] : normal appearance [Well Groomed] : well groomed [General Appearance - Well Nourished] : well nourished [No Deformities] : no deformities [General Appearance - In No Acute Distress] : no acute distress [Normal Conjunctiva] : the conjunctiva exhibited no abnormalities [Normal Jugular Venous A Waves Present] : normal jugular venous A waves present [Normal Jugular Venous V Waves Present] : normal jugular venous V waves present [No Jugular Venous Clemons A Waves] : no jugular venous clemons A waves [Respiration, Rhythm And Depth] : normal respiratory rhythm and effort [Exaggerated Use Of Accessory Muscles For Inspiration] : no accessory muscle use [Auscultation Breath Sounds / Voice Sounds] : lungs were clear to auscultation bilaterally [Bowel Sounds] : normal bowel sounds [Abdomen Soft] : soft [Abdomen Tenderness] : non-tender [Abnormal Walk] : normal gait [Nail Clubbing] : no clubbing of the fingernails [Cyanosis, Localized] : no localized cyanosis [Petechial Hemorrhages (___cm)] : no petechial hemorrhages [Skin Color & Pigmentation] : normal skin color and pigmentation [] : no rash [No Skin Ulcers] : no skin ulcer [Oriented To Time, Place, And Person] : oriented to person, place, and time [Affect] : the affect was normal [Mood] : the mood was normal [No Anxiety] : not feeling anxious [Bradycardia] : bradycardic [Rhythm Regular] : regular [Normal S1] : normal S1 [Normal S2] : normal S2 [I] : a grade 1 [II] : a grade 2 [No Pitting Edema] : no pitting edema present [FreeTextEntry1] : She was wearing a face mask during the examination.  [S3] : no S3 [Left Carotid Bruit] : no bruit heard over the left carotid [Right Carotid Bruit] : no bruit heard over the right carotid [Bruit] : no bruit heard

## 2024-09-29 NOTE — HISTORY OF PRESENT ILLNESS
[FreeTextEntry1] : Patient is a 69 year old woman with a history of coronary artery disease status post stents to the OM1 and LAD 1/2014 and status post LEONCIO to the mid LAD and distal RCA 5/2022, HTN, hyperlipidemia, diet- controlled Diabetes mellitus, [place] [facility] [place] and family history of coronary artery disease who presents today for follow up of coronary artery disease, hyperlipidemia, and HTN. She states that her blood pressure has been OK at home. She has been watching her diet and does not understand why her triglycerides were recently elevated. She states that she had an elevated blood pressure reading earlier this week of 148/80 and had a headache, but has otherwise been good. She has arthritic discomfort in her hands since taking the hormone therapy for cancer. She otherwise denies any chest pain, shortness of breath, palpitations, or dizziness. She does state that she feels weak at times and is wondering if her vitamin levels are low. Two weeks ago, her triglycerides were 252 and her total cholesterol was 221.

## 2024-09-29 NOTE — HISTORY OF PRESENT ILLNESS
[FreeTextEntry1] : Patient is a 69 year old woman with a history of coronary artery disease status post stents to the OM1 and LAD 1/2014 and status post LEONCIO to the mid LAD and distal RCA 5/2022, HTN, hyperlipidemia, diet- controlled Diabetes mellitus, breast cancer status [place] 5[place]23/[place]23 [place] [facility] [place], and family history of coronary artery disease who presents today for follow up of coronary artery disease, hyperlipidemia, and HTN. She states that her blood pressure has been OK at home. She has been watching her diet and does not understand why her triglycerides were recently elevated. She states that she had an elevated blood pressure reading earlier this week of 148/80 and had a headache, but has otherwise been good. She has arthritic discomfort in her hands since taking the hormone therapy for cancer. She otherwise denies any chest pain, shortness of breath, palpitations, or dizziness. She does state that she feels weak at times and is wondering if her vitamin levels are low. Two weeks ago, her triglycerides were 252 and her total cholesterol was 221.

## 2024-09-29 NOTE — HISTORY OF PRESENT ILLNESS
[FreeTextEntry1] : Patient is a 69 year old woman with a history of coronary artery disease status post stents to the OM1 and LAD 1/2014 and status post LEONCIO to the mid LAD and distal RCA 5/2022, HTN, hyperlipidemia, diet- controlled Diabetes mellitus, breast cancer status post [place] 5/23/2023 at [facility] [place], and family history of coronary artery disease who presents today for follow up of coronary artery disease, hyperlipidemia, and HTN. She states that her blood pressure has been OK at home. She has been watching her diet and does not understand why her triglycerides were recently elevated. She states that she had an elevated blood pressure reading earlier this week of 148/80 and had a headache, but has otherwise been good. She has arthritic discomfort in her hands since taking the hormone therapy for cancer. She otherwise denies any chest pain, shortness of breath, palpitations, or dizziness. She does state that she feels weak at times and is wondering if her vitamin levels are low. Two weeks ago, her triglycerides were 252 and her total cholesterol was 221.

## 2024-10-01 LAB
CHOLEST SERPL-MCNC: 162 MG/DL
HDLC SERPL-MCNC: 42 MG/DL
LDLC SERPL CALC-MCNC: 76 MG/DL
NONHDLC SERPL-MCNC: 120 MG/DL
TRIGL SERPL-MCNC: 272 MG/DL

## 2024-10-07 ENCOUNTER — NON-APPOINTMENT (OUTPATIENT)
Age: 69
End: 2024-10-07

## 2024-10-07 ENCOUNTER — APPOINTMENT (OUTPATIENT)
Dept: OPHTHALMOLOGY | Facility: CLINIC | Age: 69
End: 2024-10-07
Payer: MEDICARE

## 2024-10-07 PROCEDURE — 92250 FUNDUS PHOTOGRAPHY W/I&R: CPT

## 2024-10-07 PROCEDURE — 92015 DETERMINE REFRACTIVE STATE: CPT

## 2024-10-07 PROCEDURE — 92004 COMPRE OPH EXAM NEW PT 1/>: CPT

## 2024-10-11 RX ORDER — ICOSAPENT ETHYL 1 G/1
1 CAPSULE ORAL
Qty: 180 | Refills: 1 | Status: ACTIVE | COMMUNITY
Start: 2024-10-01 | End: 1900-01-01

## 2025-01-03 ENCOUNTER — APPOINTMENT (OUTPATIENT)
Dept: GASTROENTEROLOGY | Facility: CLINIC | Age: 70
End: 2025-01-03
Payer: COMMERCIAL

## 2025-01-03 VITALS
OXYGEN SATURATION: 99 % | RESPIRATION RATE: 14 BRPM | HEART RATE: 65 BPM | SYSTOLIC BLOOD PRESSURE: 138 MMHG | DIASTOLIC BLOOD PRESSURE: 68 MMHG | TEMPERATURE: 97 F | BODY MASS INDEX: 34.27 KG/M2 | WEIGHT: 186.25 LBS | HEIGHT: 62 IN

## 2025-01-03 DIAGNOSIS — R14.2 ERUCTATION: ICD-10-CM

## 2025-01-03 DIAGNOSIS — I10 ESSENTIAL (PRIMARY) HYPERTENSION: ICD-10-CM

## 2025-01-03 DIAGNOSIS — I35.9 NONRHEUMATIC AORTIC VALVE DISORDER, UNSPECIFIED: ICD-10-CM

## 2025-01-03 DIAGNOSIS — Z12.11 ENCOUNTER FOR SCREENING FOR MALIGNANT NEOPLASM OF COLON: ICD-10-CM

## 2025-01-03 PROCEDURE — 99204 OFFICE O/P NEW MOD 45 MIN: CPT

## 2025-01-03 RX ORDER — SODIUM SULFATE, POTASSIUM SULFATE AND MAGNESIUM SULFATE 1.6; 3.13; 17.5 G/177ML; G/177ML; G/177ML
17.5-3.13-1.6 SOLUTION ORAL
Qty: 354 | Refills: 0 | Status: ACTIVE | COMMUNITY
Start: 2025-01-03 | End: 1900-01-01

## 2025-01-03 RX ORDER — ANASTROZOLE TABLETS 1 MG/1
1 TABLET ORAL
Refills: 0 | Status: ACTIVE | COMMUNITY

## 2025-01-03 RX ORDER — SODIUM SULFATE, POTASSIUM SULFATE, MAGNESIUM SULFATE 1.6; 3.13; 17.5 G/177ML; G/177ML; G/177ML
0 SOLUTION ORAL
Qty: 0 | Refills: 0 | DISCHARGE
Start: 2025-01-03

## 2025-01-07 NOTE — H&P CARDIOLOGY - PSYCHIATRIC
Patient was contacted and given providers message.  Patient was reminded of her appointment day and time.  Patient verbalized understanding.   Affect and characteristics of appearance, verbalizations, behaviors are appropriate

## 2025-01-24 ENCOUNTER — OUTPATIENT (OUTPATIENT)
Dept: OUTPATIENT SERVICES | Facility: HOSPITAL | Age: 70
LOS: 1 days | End: 2025-01-24
Payer: COMMERCIAL

## 2025-01-24 ENCOUNTER — RESULT REVIEW (OUTPATIENT)
Age: 70
End: 2025-01-24

## 2025-01-24 ENCOUNTER — APPOINTMENT (OUTPATIENT)
Dept: CV DIAGNOSITCS | Facility: HOSPITAL | Age: 70
End: 2025-01-24

## 2025-01-24 DIAGNOSIS — Z98.89 OTHER SPECIFIED POSTPROCEDURAL STATES: Chronic | ICD-10-CM

## 2025-01-24 DIAGNOSIS — Z90.710 ACQUIRED ABSENCE OF BOTH CERVIX AND UTERUS: Chronic | ICD-10-CM

## 2025-01-24 DIAGNOSIS — I35.9 NONRHEUMATIC AORTIC VALVE DISORDER, UNSPECIFIED: ICD-10-CM

## 2025-01-24 DIAGNOSIS — Z95.5 PRESENCE OF CORONARY ANGIOPLASTY IMPLANT AND GRAFT: Chronic | ICD-10-CM

## 2025-01-24 PROCEDURE — 93356 MYOCRD STRAIN IMG SPCKL TRCK: CPT

## 2025-01-24 PROCEDURE — 93306 TTE W/DOPPLER COMPLETE: CPT

## 2025-01-24 PROCEDURE — 93306 TTE W/DOPPLER COMPLETE: CPT | Mod: 26

## 2025-01-29 ENCOUNTER — NON-APPOINTMENT (OUTPATIENT)
Age: 70
End: 2025-01-29

## 2025-01-29 ENCOUNTER — APPOINTMENT (OUTPATIENT)
Dept: CARDIOLOGY | Facility: CLINIC | Age: 70
End: 2025-01-29
Payer: COMMERCIAL

## 2025-01-29 VITALS
WEIGHT: 186 LBS | SYSTOLIC BLOOD PRESSURE: 120 MMHG | HEART RATE: 60 BPM | HEIGHT: 62 IN | RESPIRATION RATE: 12 BRPM | DIASTOLIC BLOOD PRESSURE: 80 MMHG | OXYGEN SATURATION: 97 % | BODY MASS INDEX: 34.23 KG/M2

## 2025-01-29 DIAGNOSIS — I25.10 ATHEROSCLEROTIC HEART DISEASE OF NATIVE CORONARY ARTERY W/OUT ANGINA PECTORIS: ICD-10-CM

## 2025-01-29 PROCEDURE — 99204 OFFICE O/P NEW MOD 45 MIN: CPT

## 2025-01-29 PROCEDURE — 93000 ELECTROCARDIOGRAM COMPLETE: CPT

## 2025-01-29 PROCEDURE — 99214 OFFICE O/P EST MOD 30 MIN: CPT

## 2025-02-07 DIAGNOSIS — D50.8 OTHER IRON DEFICIENCY ANEMIAS: ICD-10-CM

## 2025-02-21 ENCOUNTER — OUTPATIENT (OUTPATIENT)
Dept: OUTPATIENT SERVICES | Facility: HOSPITAL | Age: 70
LOS: 1 days | End: 2025-02-21
Payer: COMMERCIAL

## 2025-02-21 ENCOUNTER — TRANSCRIPTION ENCOUNTER (OUTPATIENT)
Age: 70
End: 2025-02-21

## 2025-02-21 ENCOUNTER — APPOINTMENT (OUTPATIENT)
Dept: GASTROENTEROLOGY | Facility: HOSPITAL | Age: 70
End: 2025-02-21
Payer: COMMERCIAL

## 2025-02-21 VITALS
SYSTOLIC BLOOD PRESSURE: 145 MMHG | DIASTOLIC BLOOD PRESSURE: 65 MMHG | OXYGEN SATURATION: 100 % | HEART RATE: 52 BPM | RESPIRATION RATE: 14 BRPM

## 2025-02-21 VITALS
SYSTOLIC BLOOD PRESSURE: 140 MMHG | HEIGHT: 62 IN | HEART RATE: 76 BPM | RESPIRATION RATE: 17 BRPM | OXYGEN SATURATION: 100 % | TEMPERATURE: 98 F | DIASTOLIC BLOOD PRESSURE: 78 MMHG | WEIGHT: 184.09 LBS

## 2025-02-21 DIAGNOSIS — Z98.89 OTHER SPECIFIED POSTPROCEDURAL STATES: Chronic | ICD-10-CM

## 2025-02-21 DIAGNOSIS — Z12.11 ENCOUNTER FOR SCREENING FOR MALIGNANT NEOPLASM OF COLON: ICD-10-CM

## 2025-02-21 DIAGNOSIS — Z95.5 PRESENCE OF CORONARY ANGIOPLASTY IMPLANT AND GRAFT: Chronic | ICD-10-CM

## 2025-02-21 DIAGNOSIS — R14.2 ERUCTATION: ICD-10-CM

## 2025-02-21 DIAGNOSIS — Z90.710 ACQUIRED ABSENCE OF BOTH CERVIX AND UTERUS: Chronic | ICD-10-CM

## 2025-02-21 PROCEDURE — 88342 IMHCHEM/IMCYTCHM 1ST ANTB: CPT

## 2025-02-21 PROCEDURE — 45385 COLONOSCOPY W/LESION REMOVAL: CPT | Mod: PT

## 2025-02-21 PROCEDURE — 43239 EGD BIOPSY SINGLE/MULTIPLE: CPT

## 2025-02-21 PROCEDURE — 88341 IMHCHEM/IMCYTCHM EA ADD ANTB: CPT | Mod: 26

## 2025-02-21 PROCEDURE — 88341 IMHCHEM/IMCYTCHM EA ADD ANTB: CPT

## 2025-02-21 PROCEDURE — 88342 IMHCHEM/IMCYTCHM 1ST ANTB: CPT | Mod: 26

## 2025-02-21 PROCEDURE — 88305 TISSUE EXAM BY PATHOLOGIST: CPT | Mod: 26

## 2025-02-21 PROCEDURE — 45385 COLONOSCOPY W/LESION REMOVAL: CPT

## 2025-02-21 PROCEDURE — C1889: CPT

## 2025-02-21 PROCEDURE — 88305 TISSUE EXAM BY PATHOLOGIST: CPT

## 2025-02-21 DEVICE — NET RETRV ROT ROTH 2.5MMX230CM: Type: IMPLANTABLE DEVICE | Status: FUNCTIONAL

## 2025-02-21 NOTE — ASU DISCHARGE PLAN (ADULT/PEDIATRIC) - CARE PROVIDER_API CALL
Jesse Mauricio  Gastroenterology  [address], [address]  [place] [address]  Phone: [phone]  Fax: [phone]  Follow Up Time: 1 month

## 2025-02-21 NOTE — ASU PREOP CHECKLIST - WAS PATIENT ON BETA BLOCKER?
[address] [address]  [facility]# [id number]  Isabel Benitez M.D.  Medical Director [facility] -___ ___ - ___ ___ ___ ___  Histology Only   HISTOLOGY REQUISITION     Patient Name: Rosalie Ospina    YOB: 1992    Medical Record Number: [id number]    (Affix barcode label in box, if available)       Collection Site: {:[id number]}   Physician(s): Amy Sutton MD   Collection Date: ***   Anatomic Site: ***         Collection Time: ***  Preoperative Diagnosis: ***  ICD-10 Code: ***    Clinical History: {HISTORY:424780}     Frozen or Routine (ASC Use Only)    List each Sample in corresponding container:      [place] F or R         Time     A. *** F      R      B.  F      R      C.  F      R      D.  F      R      E.  F      R      F.  F      R      G.  F      R      H.  F      R      I.  F      R        Services Needed: {:[id number]}  Is this a Breast specimen: {Y/N:[id number]}  Requesting Department: [place] [address] MAIN OB  Call Back Number of Requesting Department: Dept: [phone]  Requisition Completed by: Sierra Wallace CMA  If ASC or Endo Verification by: ***    Notes: ***     No

## 2025-02-21 NOTE — ASU DISCHARGE PLAN (ADULT/PEDIATRIC) - NS MD DC FALL RISK RISK
For information on Fall & Injury Prevention, visit: [email or web address] OR  [email or web address] OR  https://www.cdc.gov/steadi/patient.html

## 2025-02-21 NOTE — ASU DISCHARGE PLAN (ADULT/PEDIATRIC) - FINANCIAL ASSISTANCE
[facility] provides services at a reduced cost to those who are determined to be eligible through [facility]’s financial assistance program. Information regarding [facility]’s financial assistance program can be found by going to [email or web address] or by calling [phone].

## 2025-02-21 NOTE — ASU PATIENT PROFILE, ADULT - NSICDXPASTSURGICALHX_GEN_ALL_CORE_FT
PAST SURGICAL HISTORY:  History of D&C     S/P breast lumpectomy L, 2011    S/P primary angioplasty with coronary stent 2014 x 2, 2021 x2    S/P total hysterectomy 2016

## 2025-03-04 LAB — SURGICAL PATHOLOGY STUDY: SIGNIFICANT CHANGE UP

## 2025-03-05 ENCOUNTER — NON-APPOINTMENT (OUTPATIENT)
Age: 70
End: 2025-03-05

## 2025-03-05 DIAGNOSIS — B96.81 GASTRITIS, UNSPECIFIED, W/OUT BLEEDING: ICD-10-CM

## 2025-03-05 DIAGNOSIS — K29.70 GASTRITIS, UNSPECIFIED, W/OUT BLEEDING: ICD-10-CM

## 2025-03-05 RX ORDER — RIFABUTIN 150 MG/1
150 CAPSULE ORAL
Qty: 28 | Refills: 0 | Status: ACTIVE | COMMUNITY
Start: 2025-03-05 | End: 1900-01-01

## 2025-03-05 RX ORDER — OMEPRAZOLE 20 MG/1
1 CAPSULE, DELAYED RELEASE ORAL
Qty: 0 | Refills: 0 | DISCHARGE
Start: 2025-03-05

## 2025-03-05 RX ORDER — AMOXICILLIN 500 MG/1
500 CAPSULE ORAL 3 TIMES DAILY
Qty: 84 | Refills: 0 | Status: ACTIVE | COMMUNITY
Start: 2025-03-05 | End: 1900-01-01

## 2025-03-05 RX ORDER — OMEPRAZOLE 40 MG/1
40 CAPSULE, DELAYED RELEASE ORAL 3 TIMES DAILY
Qty: 42 | Refills: 0 | Status: ACTIVE | COMMUNITY
Start: 2025-03-05 | End: 1900-01-01

## 2025-03-05 RX ORDER — RIFABUTIN 150 MG/1
1 CAPSULE ORAL
Qty: 0 | Refills: 0 | DISCHARGE
Start: 2025-03-05

## 2025-03-05 RX ORDER — AMOXICILLIN 500 MG/1
2 CAPSULE ORAL
Qty: 0 | Refills: 0 | DISCHARGE
Start: 2025-03-05

## 2025-03-11 ENCOUNTER — INPATIENT (INPATIENT)
Facility: HOSPITAL | Age: 70
LOS: 4 days | Discharge: HOME | End: 2025-03-16
Attending: HOSPITALIST | Admitting: HOSPITALIST
Payer: COMMERCIAL

## 2025-03-11 VITALS
HEIGHT: 62 IN | DIASTOLIC BLOOD PRESSURE: 72 MMHG | OXYGEN SATURATION: 97 % | SYSTOLIC BLOOD PRESSURE: 155 MMHG | HEART RATE: 59 BPM | TEMPERATURE: 99 F | RESPIRATION RATE: 16 BRPM

## 2025-03-11 DIAGNOSIS — Z95.5 PRESENCE OF CORONARY ANGIOPLASTY IMPLANT AND GRAFT: Chronic | ICD-10-CM

## 2025-03-11 DIAGNOSIS — Z98.89 OTHER SPECIFIED POSTPROCEDURAL STATES: Chronic | ICD-10-CM

## 2025-03-11 DIAGNOSIS — Z90.710 ACQUIRED ABSENCE OF BOTH CERVIX AND UTERUS: Chronic | ICD-10-CM

## 2025-03-11 PROCEDURE — 99291 CRITICAL CARE FIRST HOUR: CPT

## 2025-03-11 NOTE — ED ADULT TRIAGE NOTE - CHIEF COMPLAINT QUOTE
Pt c/o vertigo since yesterday, at 6am and tonight at 10pm she started having a headache. Phx. Vertigo, HTN pre-DM, 4 stents, on plavix.

## 2025-03-12 LAB
ALBUMIN SERPL ELPH-MCNC: 4 G/DL — SIGNIFICANT CHANGE UP (ref 3.3–5)
ALP SERPL-CCNC: 87 U/L — SIGNIFICANT CHANGE UP (ref 40–120)
ANION GAP SERPL CALC-SCNC: 13 MMOL/L — SIGNIFICANT CHANGE UP (ref 7–14)
APPEARANCE UR: CLEAR — SIGNIFICANT CHANGE UP
APTT BLD: 23.1 SEC — LOW (ref 24.5–35.6)
AST SERPL-CCNC: 19 U/L — SIGNIFICANT CHANGE UP (ref 4–32)
BASOPHILS # BLD AUTO: 0.01 K/UL — SIGNIFICANT CHANGE UP (ref 0–0.2)
BASOPHILS NFR BLD AUTO: 0.1 % — SIGNIFICANT CHANGE UP (ref 0–2)
BILIRUB SERPL-MCNC: 0.3 MG/DL — SIGNIFICANT CHANGE UP (ref 0.2–1.2)
BILIRUB UR-MCNC: NEGATIVE — SIGNIFICANT CHANGE UP
BUN SERPL-MCNC: 15 MG/DL — SIGNIFICANT CHANGE UP (ref 7–23)
CALCIUM SERPL-MCNC: 9.5 MG/DL — SIGNIFICANT CHANGE UP (ref 8.4–10.5)
CHLORIDE SERPL-SCNC: 107 MMOL/L — SIGNIFICANT CHANGE UP (ref 98–107)
CO2 SERPL-SCNC: 22 MMOL/L — SIGNIFICANT CHANGE UP (ref 22–31)
CREAT SERPL-MCNC: 0.68 MG/DL — SIGNIFICANT CHANGE UP (ref 0.5–1.3)
DIFF PNL FLD: NEGATIVE — SIGNIFICANT CHANGE UP
EGFR: 94 ML/MIN/1.73M2 — SIGNIFICANT CHANGE UP
EGFR: 94 ML/MIN/1.73M2 — SIGNIFICANT CHANGE UP
EOSINOPHIL # BLD AUTO: 0.06 K/UL — SIGNIFICANT CHANGE UP (ref 0–0.5)
EOSINOPHIL NFR BLD AUTO: 0.7 % — SIGNIFICANT CHANGE UP (ref 0–6)
GLUCOSE UR QL: NEGATIVE MG/DL — SIGNIFICANT CHANGE UP
HCT VFR BLD CALC: 37.6 % — SIGNIFICANT CHANGE UP (ref 34.5–45)
HGB BLD-MCNC: 12.4 G/DL — SIGNIFICANT CHANGE UP (ref 11.5–15.5)
IANC: 6.89 K/UL — SIGNIFICANT CHANGE UP (ref 1.8–7.4)
IMM GRANULOCYTES NFR BLD AUTO: 0.4 % — SIGNIFICANT CHANGE UP (ref 0–0.9)
INR BLD: 0.9 RATIO — SIGNIFICANT CHANGE UP (ref 0.85–1.16)
KETONES UR-MCNC: NEGATIVE MG/DL — SIGNIFICANT CHANGE UP
LEUKOCYTE ESTERASE UR-ACNC: NEGATIVE — SIGNIFICANT CHANGE UP
LYMPHOCYTES # BLD AUTO: 1.44 K/UL — SIGNIFICANT CHANGE UP (ref 1–3.3)
LYMPHOCYTES # BLD AUTO: 16 % — SIGNIFICANT CHANGE UP (ref 13–44)
MCHC RBC-ENTMCNC: 30 PG — SIGNIFICANT CHANGE UP (ref 27–34)
MCHC RBC-ENTMCNC: 33 G/DL — SIGNIFICANT CHANGE UP (ref 32–36)
MCV RBC AUTO: 90.8 FL — SIGNIFICANT CHANGE UP (ref 80–100)
MONOCYTES # BLD AUTO: 0.54 K/UL — SIGNIFICANT CHANGE UP (ref 0–0.9)
MONOCYTES NFR BLD AUTO: 6 % — SIGNIFICANT CHANGE UP (ref 2–14)
NEUTROPHILS # BLD AUTO: 6.89 K/UL — SIGNIFICANT CHANGE UP (ref 1.8–7.4)
NEUTROPHILS NFR BLD AUTO: 76.8 % — SIGNIFICANT CHANGE UP (ref 43–77)
NITRITE UR-MCNC: NEGATIVE — SIGNIFICANT CHANGE UP
NRBC # BLD AUTO: 0 K/UL — SIGNIFICANT CHANGE UP (ref 0–0)
NRBC # FLD: 0 K/UL — SIGNIFICANT CHANGE UP (ref 0–0)
NRBC BLD AUTO-RTO: 0 /100 WBCS — SIGNIFICANT CHANGE UP (ref 0–0)
PH UR: 7 — SIGNIFICANT CHANGE UP (ref 5–8)
PLATELET # BLD AUTO: 243 K/UL — SIGNIFICANT CHANGE UP (ref 150–400)
POTASSIUM SERPL-MCNC: 4.4 MMOL/L — SIGNIFICANT CHANGE UP (ref 3.5–5.3)
PROT SERPL-MCNC: 7.3 G/DL — SIGNIFICANT CHANGE UP (ref 6–8.3)
PROT UR-MCNC: NEGATIVE MG/DL — SIGNIFICANT CHANGE UP
PROTHROM AB SERPL-ACNC: 10.7 SEC — SIGNIFICANT CHANGE UP (ref 9.9–13.4)
RBC # BLD: 4.14 M/UL — SIGNIFICANT CHANGE UP (ref 3.8–5.2)
SODIUM SERPL-SCNC: 142 MMOL/L — SIGNIFICANT CHANGE UP (ref 135–145)
SP GR SPEC: 1.02 — SIGNIFICANT CHANGE UP (ref 1–1.03)
TROPONIN T, HIGH SENSITIVITY RESULT: 9 NG/L — SIGNIFICANT CHANGE UP
UROBILINOGEN FLD QL: 0.2 MG/DL — SIGNIFICANT CHANGE UP (ref 0.2–1)
WBC # BLD: 8.98 K/UL — SIGNIFICANT CHANGE UP (ref 3.8–10.5)
WBC # FLD AUTO: 8.98 K/UL — SIGNIFICANT CHANGE UP (ref 3.8–10.5)

## 2025-03-12 PROCEDURE — 70498 CT ANGIOGRAPHY NECK: CPT | Mod: 26

## 2025-03-12 PROCEDURE — 99223 1ST HOSP IP/OBS HIGH 75: CPT

## 2025-03-12 PROCEDURE — 70496 CT ANGIOGRAPHY HEAD: CPT | Mod: 26

## 2025-03-12 RX ORDER — ANASTROZOLE 1 MG/1
1 TABLET ORAL ONCE
Refills: 0 | Status: COMPLETED | OUTPATIENT
Start: 2025-03-12 | End: 2025-03-12

## 2025-03-12 RX ORDER — MECLIZINE HCL 12.5 MG
25 TABLET ORAL EVERY 8 HOURS
Refills: 0 | Status: DISCONTINUED | OUTPATIENT
Start: 2025-03-12 | End: 2025-03-16

## 2025-03-12 RX ORDER — SPIRONOLACTONE 25 MG
25 TABLET ORAL DAILY
Refills: 0 | Status: DISCONTINUED | OUTPATIENT
Start: 2025-03-12 | End: 2025-03-13

## 2025-03-12 RX ORDER — ENALAPRIL MALEATE 20 MG
20 TABLET ORAL DAILY
Refills: 0 | Status: DISCONTINUED | OUTPATIENT
Start: 2025-03-12 | End: 2025-03-13

## 2025-03-12 RX ORDER — DIAZEPAM 2 MG/1
5 TABLET ORAL EVERY 8 HOURS
Refills: 0 | Status: DISCONTINUED | OUTPATIENT
Start: 2025-03-13 | End: 2025-03-13

## 2025-03-12 RX ORDER — METOPROLOL SUCCINATE 50 MG/1
25 TABLET, EXTENDED RELEASE ORAL DAILY
Refills: 0 | Status: DISCONTINUED | OUTPATIENT
Start: 2025-03-12 | End: 2025-03-13

## 2025-03-12 RX ORDER — MECLIZINE HCL 12.5 MG
25 TABLET ORAL ONCE
Refills: 0 | Status: COMPLETED | OUTPATIENT
Start: 2025-03-12 | End: 2025-03-12

## 2025-03-12 RX ORDER — ATORVASTATIN CALCIUM 80 MG/1
40 TABLET, FILM COATED ORAL AT BEDTIME
Refills: 0 | Status: DISCONTINUED | OUTPATIENT
Start: 2025-03-12 | End: 2025-03-16

## 2025-03-12 RX ORDER — SCOPOLAMINE 1 MG/3D
1 PATCH, EXTENDED RELEASE TRANSDERMAL
Refills: 0 | Status: DISCONTINUED | OUTPATIENT
Start: 2025-03-12 | End: 2025-03-16

## 2025-03-12 RX ORDER — DIAZEPAM 2 MG/1
5 TABLET ORAL ONCE
Refills: 0 | Status: DISCONTINUED | OUTPATIENT
Start: 2025-03-12 | End: 2025-03-12

## 2025-03-12 RX ORDER — AMLODIPINE BESYLATE 10 MG/1
10 TABLET ORAL DAILY
Refills: 0 | Status: DISCONTINUED | OUTPATIENT
Start: 2025-03-12 | End: 2025-03-13

## 2025-03-12 RX ORDER — METOCLOPRAMIDE HCL 10 MG
10 TABLET ORAL ONCE
Refills: 0 | Status: COMPLETED | OUTPATIENT
Start: 2025-03-12 | End: 2025-03-12

## 2025-03-12 RX ADMIN — Medication 1000 MILLILITER(S): at 01:23

## 2025-03-12 RX ADMIN — Medication 25 MILLIGRAM(S): at 08:30

## 2025-03-12 RX ADMIN — Medication 25 MILLIGRAM(S): at 02:26

## 2025-03-12 RX ADMIN — Medication 75 MILLILITER(S): at 17:32

## 2025-03-12 RX ADMIN — Medication 25 MILLIGRAM(S): at 13:52

## 2025-03-12 RX ADMIN — ANASTROZOLE 1 MILLIGRAM(S): 1 TABLET ORAL at 04:41

## 2025-03-12 RX ADMIN — SCOPOLAMINE 1 PATCH: 1 PATCH, EXTENDED RELEASE TRANSDERMAL at 01:27

## 2025-03-12 RX ADMIN — DIAZEPAM 5 MILLIGRAM(S): 2 TABLET ORAL at 17:32

## 2025-03-12 RX ADMIN — Medication 10 MILLIGRAM(S): at 01:24

## 2025-03-12 RX ADMIN — DIAZEPAM 5 MILLIGRAM(S): 2 TABLET ORAL at 05:18

## 2025-03-12 RX ADMIN — SCOPOLAMINE 1 PATCH: 1 PATCH, EXTENDED RELEASE TRANSDERMAL at 18:29

## 2025-03-12 NOTE — CONSULT NOTE ADULT - ASSESSMENT
DIETER GUILLORY is a 70y (1955) woman with a PMH of hypertension, hyperlipidemia, CAD s/p stent (most recent 2022 on aspirin), vertigo who presents with dizziness. Patient was in her usual state of health until Sunday night at 9Pm when she went to bed. Monday morning patient says she was turning her head to her right side and had sudden onset room spinning dizziness. She had nausea and was dry-heaving. Dizziness is worse with movement, more severe since last night and she has difficulty walking. Patient states symptoms are similar to her previous episodes of vertigo, but unrelieved with meclizine at home.  Her last severe vertigo attack was in 2015. MR brain in 2019 did not show any infarcts. Patient states she now has a frontal headache. Denies any vision disturbance, lateropulsion, focal weakness or numbness. She reports feeling sensation of water in her left ear but denies any ear pain, hearing loss or tinnitus. No falls or head strike. She takes aspirin 81mg daily at home. CTH and CTA in ED were unrevealing for acute infarct or significant posterior circulation stenosis.   In the ED, she received meclizine 25mg x1, reglan x1, scopolamine patch without much improvement in her symptoms. She reports diazepam 5mg at 5 am gave her some mild relief.     LKW:  21:00 3/9  NIHSS:  0  Baseline MRS: 0  Not a Teneceteplase candidate due to out of window  Not a thrombectomy candidate due to no LVO     Impression:    Episodic room spinning sensation and nausea associated with head movement, likely peripheral etiology of vertigo. Possibly localizing to left ear dysfunction. Low concern for posterior circulation dysfunction     Recommendations:    - If patient cannot ambulate, would recommend to stay for PT eval. Patient may benefit from inpatient [facility]  - Symptomatic management with Meclizine 25mg TID PRN (up to 1 week) or consider diazepam and zofran PRN   - Outpatient vestibular rehab and ENT follow-up post discharge   - Neuro-checks and VS q4h  - BP goal normotension 120/80   - EKG, telemetry, orthostatic vitals   - Fall precautions  - PT/ OT as tolerated     Patient to be seen by Neurology attending, note finalized upon attending attestation.

## 2025-03-12 NOTE — ED PROVIDER NOTE - CLINICAL SUMMARY MEDICAL DECISION MAKING FREE TEXT BOX
70-year-old female with history of hypertension hyperlipidemia CAD on aspirin and Plavix, left breast cancer, presenting with dizziness starting yesterday at 6 AM.  Differential includes but is not limited to Ménière's versus BPPV versus central vertigo including ICH.  Though ICH seems less likely at this time we will perform CTA to evaluate for TIA versus stroke.  Anticipate patient will improve with medications but will try scopolamine and Reglan now since she is taking meclizine earlier.

## 2025-03-12 NOTE — ED CDU PROVIDER INITIAL DAY NOTE - PHYSICAL EXAMINATION
Well appearing, well nourished, awake, alert, oriented to person, place, time/situation and in no apparent distress.    Airway patent    Eyes without scleral injection. No jaundice.    Strong pulse.    Respirations unlabored.    Spine appears normal, range of motion is not limited.     Alert and oriented, no gross motor or sensory deficits. Hold head when moving, able to stand and take few steps but with assistance     Skin normal color for race, warm, dry and intact. No evidence of rash.

## 2025-03-12 NOTE — ED CDU PROVIDER INITIAL DAY NOTE - PROGRESS NOTE DETAILS
Pt reassessed, unable to move/stand out of bed w/o vertigo, but states sx are subsiding. Will administer valium and reassess.

## 2025-03-12 NOTE — PHYSICAL THERAPY INITIAL EVALUATION ADULT - PERTINENT HX OF CURRENT PROBLEM, REHAB EVAL
70 year old female presents with dizziness. CT head and CT angio unrevealing for acute infarct or significant posterior circulation stenosis.

## 2025-03-12 NOTE — ED PROVIDER NOTE - OBJECTIVE STATEMENT
70-year-old female with history of hypertension hyperlipidemia CAD status post stents on aspirin and Plavix, left breast cancer, presenting with intermittent room spinning dizziness that started yesterday at 6 AM.  Patient states that when she moves it makes her dizziness worse.  She had meclizine from a prior time that this happened for which she took 2 doses of 25 mg over the past 24 hours but this did not improve her symptoms.  She now feels that she is persistently dizzy but that her symptoms get worse if she moves.  She denies any chest pain shortness of breath.  She does have slight headache but this is mild.  She denies any abdominal pain nausea vomiting urinary symptoms lower extremity swelling rashes.  She has been taking all of her medications as prescribed.  She denies any vision changes any speech changes any hearing changes except for some tinnitus but she has had multiple times in the past.

## 2025-03-12 NOTE — ED PROVIDER NOTE - ATTENDING CONTRIBUTION TO CARE
70-year-old female with a history of hypertension, hyperlipidemia, CAD status post stents on baby aspirin, vertigo presenting with dizziness.  Patient reports 2 days of room spinning dizziness that is intermittent, worse with certain movements.  Patient states symptoms are similar to her previous episodes of vertigo, but unrelieved with meclizine at home.  Associated with mild diffuse headache, but denies any fever, chills, neck pain or stiffness, vision changes, vomiting, chest pain, shortness of breath, focal weakness, numbness, tingling.  No recent falls or injuries.  No new medication changes.    Well appearing, lying in stretcher, awake and alert, nontoxic.  AF/VSS.  NCAT EOMI PERRL no nystagmus.  Neck supple.  Lungs cta bl.  Cards nl S1/S2, RRR, no MRG.  Abd soft obese ntnd.  No pedal edema or calf tenderness.  No prontaor drift, sensation and strength grossly intact x4, CN II-XII intact. Gait exam deferred due to pt's sxs.    Given patient's age and risk factors there is concern for posterior circulation stroke although symptoms very vertiginous in etiology.  Also consider anemia, metabolic disturbanace, cardiac event (less likely).  Will obtain labs, EKG, CTA head and neck, supportive care with meclizine and Reglan IV fluids, and reassess for symptomatic relief.  Consider neuro consult and MRI if patient with persistent symptoms despite medications.

## 2025-03-12 NOTE — CONSULT NOTE ADULT - ATTENDING COMMENTS
70F with vascular risk factors here with vertigo x 3 days  Somewhat improved from ED arrival, but still symptomatic  Had severe vertigo attacks in the past with neg MRi in 2019  CTH neg  CTA no significant stenosis - mild bilateral carotid athero and R vert calcifications  agree with workup above, would benefit from outpt vestibular therapy on d/c

## 2025-03-12 NOTE — OCCUPATIONAL THERAPY INITIAL EVALUATION ADULT - GENERAL OBSERVATIONS, REHAB EVAL
Patient received semisupine in bed in NAD; agreeable to participate in OT evaluation. +telemetry monitor. HR 58 bpm. Spouse at bedside.

## 2025-03-12 NOTE — ED CDU PROVIDER INITIAL DAY NOTE - ATTENDING APP SHARED VISIT CONTRIBUTION OF CARE
I, Saul Castillo DO reviewed the JOSEFINA plan of care and discussed the case with the ACP.  I was available for any questions and concerns    obs for PRN meds for dizziness/ reassessment by neuro

## 2025-03-12 NOTE — CONSULT NOTE ADULT - SUBJECTIVE AND OBJECTIVE BOX
Neurology - Consult Note    -  Spectra: [phone] ([facility]), [phone] ([facility])  -    HPI: Patient DIETER GUILLORY is a 70y (1955) woman with a PMHx significant for ***      Review of Systems:  INCOMPLETE   CONSTITUTIONAL: No fevers or chills  EYES AND ENT: No visual changes or no throat pain   NECK: No pain or stiffness  RESPIRATORY: No hemoptysis or shortness of breath  CARDIOVASCULAR: No chest pain or palpitations  GASTROINTESTINAL: No melena or hematochezia  GENITOURINARY: No dysuria or hematuria  NEUROLOGICAL: +As stated in HPI above  SKIN: No itching, burning, rashes, or lesions   All other review of systems is negative unless indicated above.    Allergies:  morphine (Pruritus)  adhesives (Rash)  Dilaudid (Other)      PMHx/PSHx/Family Hx: As above, otherwise see below   Hyperlipemia  Hypertension  Arthritis  Breast cancer  CAD (coronary artery disease)    Social Hx:  No current use of tobacco, alcohol, or illicit drugs  Lives with ***    Medications:  MEDICATIONS  (STANDING):  scopolamine 1 mG/72 Hr(s) Patch 1 Patch Transdermal every 72 hours    MEDICATIONS  (PRN):      Vitals:  T(C): 37.1 (03-12-25 @ 04:00), Max: 37.1 (03-12-25 @ 04:00)  HR: 60 (03-12-25 @ 04:00) (53 - 60)  BP: 144/54 (03-12-25 @ 04:00) (144/54 - 155/72)  RR: 18 (03-12-25 @ 04:00) (16 - 18)  SpO2: 98% (03-12-25 @ 04:00) (97% - 98%)    Physical Examination: INCOMPLETE  General - NAD  Cardiovascular - Peripheral pulses palpable, no edema  Eyes - Fundoscopy with flat, sharp optic discs and no hemorrhage or exudates; Fundoscopy not well visualized; Fundoscopy not performed due to safety precautions in the setting of the COVID-19 pandemic    Neurologic Exam:  Mental status - Awake, Alert, Oriented to person, place, and time. Speech fluent, repetition and naming intact. Follows simple and complex commands. Attention/concentration, recent and remote memory (including registration and recall), and fund of knowledge intact    Cranial nerves - PERRLA, VFF, EOMI, face sensation (V1-V3) intact b/l, facial strength intact without asymmetry b/l, hearing intact b/l, palate with symmetric elevation, trapezius OR sternocleidomastiod 5/5 strength b/l, tongue midline on protrusion with full lateral movement    Motor - Normal bulk and tone throughout. No pronator drift.  Strength testing            Deltoid      Biceps      Triceps     Wrist Extension    Wrist Flexion     Interossei         R            5                 5               5                     5                              5                        5                 5  L             5                 5               5                     5                              5                        5                 5              Hip Flexion    Hip Extension    Knee Flexion    Knee Extension    Dorsiflexion    Plantar Flexion  R              5                           5                       5                           5                            5                          5  L              5                           5                        5                           5                            5                          5    Sensation - Light touch/temperature OR pain/vibration intact throughout    DTR's -             Biceps      Triceps     Brachioradialis      Patellar    Ankle    Toes/plantar response  R             2+             2+                  2+                       2+            2+                 Down  L              2+             2+                 2+                        2+           2+                 Down    Coordination - Finger to Nose intact b/l. No tremors appreciated    Gait and station - Normal casual gait. Romberg (-)    Labs:                        12.4   8.98  )-----------( 243      ( 12 Mar 2025 01:15 )             37.6     03-12    142  |  107  |  15  ----------------------------<  132[H]  4.4   |  22  |  0.68    Ca    9.5      12 Mar 2025 01:15    TPro  7.3  /  Alb  4.0  /  TBili  0.3  /  DBili  x   /  AST  19  /  ALT  12  /  AlkPhos  87  03-12    CAPILLARY BLOOD GLUCOSE      POCT Blood Glucose.: 129 mg/dL (12 Mar 2025 00:38)    LIVER FUNCTIONS - ( 12 Mar 2025 01:15 )  Alb: 4.0 g/dL / Pro: 7.3 g/dL / ALK PHOS: 87 U/L / ALT: 12 U/L / AST: 19 U/L / GGT: x             Urinalysis with Rflx Culture (collected 12 Mar 2025 04:47)      PT/INR - ( 12 Mar 2025 01:15 )   PT: 10.7 sec;   INR: 0.90 ratio         PTT - ( 12 Mar 2025 01:15 )  PTT:23.1 sec          Radiology:      CT Angio Neck w/ IV Cont (03.12.25 @ 04:25)   FINDINGS:    CT BRAIN:    There is no acute intra-axial or extra-axial hemorrhage. There is no mass   effect or shift of the midline. The basal cisterns are not effaced. There   is mild cerebral and cerebellar volume loss with prominence of the   ventricles, sulci, and cerebellar folia. A CSF filled sella turcica is   noted. There are mild chronic ischemic changes in the cerebral white   matter. There is no CT evidence of an acute vascular territorial infarct.   There is no abnormal intracranial enhancement on postcontrast images.   There are atherosclerotic calcifications of the intracranial carotid   arteries and intradural vertebral arteries.    The regional soft tissues andosseous structures are unremarkable. The   visualized paranasal sinuses and tympanic/mastoid cavities are clear   apart from mild bilateral ethmoid mucosal thickening.      CTA HEAD/NECK:    There is a three-vessel aortic arch. There are atherosclerotic   calcifications of the aortic arch. The common carotid arteries are patent   from the origins to the bifurcations. There is moderate atherosclerotic   calcification at the left carotid bifurcation. There is up to 15%   stenosis of the proximal cervical left internal carotid artery based on   NASCET criteria. There is no stenosis of the cervical right internal   carotid artery based on NASCET criteria. The cervical vertebral arteries   are patent from the origins to the skull base. The right vertebral artery   is dominant.    The skull base and intracranial carotid arteries are patent without   high-grade stenosis. There is mild luminal narrowing of the cavernous   segments secondary to atherosclerotic calcification. The proximal MCAs   and ACAs are patent without significant stenosis. The anterior   communicating artery is visualized. Distal MITCHELL and MCA branches are   grossly symmetric.    The skull base and intradural vertebral arteries and basilar artery are   patent without high-grade stenosis. There is short segment mild luminal   narrowing of the intradural right vertebral artery secondary to   atherosclerotic calcification. The proximal PCAs are patent without   significant stenosis. There is a dominant right posterior communicating   arteries supplying the right PCA. The superior cerebellar artery origins,   bilateral AICAs, and bilateral PICAs are identified.    There is no evidence of an intracranial arterial aneurysm or   arteriovenous malformation on CTA.    The dural sinuses are grossly unremarkable, however, overall poorly   opacified.    The regional soft tissues of the neck are unremarkable apart from a   heterogeneous right thyroid lobe nodule measuring 1.4 cm. The lung apices   are clear. There are degenerative changes of the spine.    CAROTID STENOSIS REFERENCE: Percent (%) stenosis is expressed in terms of   NASCET Criteria. (NASCET = 100x1-(N/D)). N=greatest narrowing. D=normal   distal diameter - MILD = <50% stenosis. - MODERATE = 50-69% stenosis. -   SEVERE = 70-89% stenosis. - HAIRLINE/CRITICAL = 90-99% stenosis. -   OCCLUDED = 100% stenosis.    IMPRESSION:    CT HEAD:  No acute intracranial hemorrhage, mass effect, or CT evidence of an acute   vascular territorial infarct. No abnormal intracranial enhancement on   postcontrast images. Mild chronic ischemic changes in the cerebral white   matter.    CTA NECK:  Patent cervical carotid and vertebral arteries. Up to 15% stenosis of the   proximal cervical left internal carotid artery based on NASCET criteria.    CTA HEAD:  No large vessel occlusion, significant stenosis or vascular abnormality   identified.       Neurology - Consult Note    -  Spectra: [phone] ([facility]), [phone] ([facility])  -    HPI: Patient DIETER GUILLORY is a 70y (1955) woman with a PMH of hypertension, hyperlipidemia, CAD s/p stent (most recent 2022 on aspirin), vertigo who presents with dizziness. Patient was in her usual state of health until Sunday night at 9Pm when she went to bed. Monday morning patient says she was turning her head to her right side and had sudden onset room spinning dizziness. She had nausea and was dry-heaving. Dizziness is worse with movement, more severe since last night and she has difficulty walking. Patient states symptoms are similar to her previous episodes of vertigo, but unrelieved with meclizine at home.  Her last severe vertigo attack was in 2015. MR brain in 2019 did not show any infarcts. Patient states she now has a frontal headache. Denies any vision disturbance, lateropulsion, focal weakness or numbness. She reports feeling sensation of water in her left ear but denies any ear pain, hearing loss or tinnitus. No falls or head strike. She takes aspirin 81mg daily at home. CTH and CTA in ED were unrevealing for acute infarct or significant posterior circulation stenosis.   In the ED, she received meclizine 25mg x1, reglan x1, scopolamine patch without much improvement in her symptoms. She reports diazepam 5mg at 5 am gave her some mild relief.       Review of Systems:    CONSTITUTIONAL: No fevers or chills  EYES AND ENT: No visual changes or no throat pain   NECK: No pain or stiffness  RESPIRATORY: No hemoptysis or shortness of breath  CARDIOVASCULAR: No chest pain or palpitations  GASTROINTESTINAL: No melena or hematochezia  GENITOURINARY: No dysuria or hematuria  NEUROLOGICAL: +As stated in HPI above  SKIN: No itching, burning, rashes, or lesions   All other review of systems is negative unless indicated above.    Allergies:  morphine (Pruritus)  adhesives (Rash)  Dilaudid (Other)      PMHx/PSHx/Family Hx: As above, otherwise see below   Hyperlipemia  Hypertension  Arthritis  Breast cancer  CAD (coronary artery disease)    Social Hx:  No current use of tobacco, alcohol, or illicit drugs    Medications:  MEDICATIONS  (STANDING):  scopolamine 1 mG/72 Hr(s) Patch 1 Patch Transdermal every 72 hours    MEDICATIONS  (PRN):      Vitals:  T(C): 37.1 (03-12-25 @ 04:00), Max: 37.1 (03-12-25 @ 04:00)  HR: 60 (03-12-25 @ 04:00) (53 - 60)  BP: 144/54 (03-12-25 @ 04:00) (144/54 - 155/72)  RR: 18 (03-12-25 @ 04:00) (16 - 18)  SpO2: 98% (03-12-25 @ 04:00) (97% - 98%)    Physical Examination:   General - Appears uncomfortable due to headache and dizziness   Eyes - Normal sclera, conjugate gaze   Head impulse- corrective saccade with head turn to left   Nystagmus- Right beating nystagmus on lateral gaze   Test of skew negative     Neurologic Exam:  Mental status - Awake, Alert, Oriented to person, place, and time. Speech fluent, repetition and naming intact. Follows 1 and 2 step crossed commands     Cranial nerves - PERRLA, VFF, EOMI, face sensation (V1-V3) intact b/l, facial strength intact without asymmetry b/l, hearing intact b/l, palate with symmetric elevation, trapezius 5/5 strength b/l, tongue midline on protrusion with full lateral movement    Motor - Normal bulk and tone throughout. No pronator drift.  Strength testing- grossly 5/5 throughout     Sensation - Light touch intact throughout    DTR's -             Biceps      Triceps     Brachioradialis      Patellar    Ankle    Toes/plantar response  R             2+             2+                  2+                       2+            2+                 Down  L              2+             2+                 2+                        2+           2+                 Down    Coordination - Finger to Nose and heel to shin intact b/l.     Gait and station - Deferred due to fall risk     Labs:                        12.4   8.98  )-----------( 243      ( 12 Mar 2025 01:15 )             37.6     03-12    142  |  107  |  15  ----------------------------<  132[H]  4.4   |  22  |  0.68    Ca    9.5      12 Mar 2025 01:15    TPro  7.3  /  Alb  4.0  /  TBili  0.3  /  DBili  x   /  AST  19  /  ALT  12  /  AlkPhos  87  03-12    CAPILLARY BLOOD GLUCOSE      POCT Blood Glucose.: 129 mg/dL (12 Mar 2025 00:38)    LIVER FUNCTIONS - ( 12 Mar 2025 01:15 )  Alb: 4.0 g/dL / Pro: 7.3 g/dL / ALK PHOS: 87 U/L / ALT: 12 U/L / AST: 19 U/L / GGT: x             Urinalysis with Rflx Culture (collected 12 Mar 2025 04:47)      PT/INR - ( 12 Mar 2025 01:15 )   PT: 10.7 sec;   INR: 0.90 ratio         PTT - ( 12 Mar 2025 01:15 )  PTT:23.1 sec        Radiology:      CT Angio Neck w/ IV Cont (03.12.25 @ 04:25)   FINDINGS:    CT BRAIN:    There is no acute intra-axial or extra-axial hemorrhage. There is no mass   effect or shift of the midline. The basal cisterns are not effaced. There   is mild cerebral and cerebellar volume loss with prominence of the   ventricles, sulci, and cerebellar folia. A CSF filled sella turcica is   noted. There are mild chronic ischemic changes in the cerebral white   matter. There is no CT evidence of an acute vascular territorial infarct.   There is no abnormal intracranial enhancement on postcontrast images.   There are atherosclerotic calcifications of the intracranial carotid   arteries and intradural vertebral arteries.    The regional soft tissues andosseous structures are unremarkable. The   visualized paranasal sinuses and tympanic/mastoid cavities are clear   apart from mild bilateral ethmoid mucosal thickening.      CTA HEAD/NECK:    There is a three-vessel aortic arch. There are atherosclerotic   calcifications of the aortic arch. The common carotid arteries are patent   from the origins to the bifurcations. There is moderate atherosclerotic   calcification at the left carotid bifurcation. There is up to 15%   stenosis of the proximal cervical left internal carotid artery based on   NASCET criteria. There is no stenosis of the cervical right internal   carotid artery based on NASCET criteria. The cervical vertebral arteries   are patent from the origins to the skull base. The right vertebral artery   is dominant.    The skull base and intracranial carotid arteries are patent without   high-grade stenosis. There is mild luminal narrowing of the cavernous   segments secondary to atherosclerotic calcification. The proximal MCAs   and ACAs are patent without significant stenosis. The anterior   communicating artery is visualized. Distal MITCHELL and MCA branches are   grossly symmetric.    The skull base and intradural vertebral arteries and basilar artery are   patent without high-grade stenosis. There is short segment mild luminal   narrowing of the intradural right vertebral artery secondary to   atherosclerotic calcification. The proximal PCAs are patent without   significant stenosis. There is a dominant right posterior communicating   arteries supplying the right PCA. The superior cerebellar artery origins,   bilateral AICAs, and bilateral PICAs are identified.    There is no evidence of an intracranial arterial aneurysm or   arteriovenous malformation on CTA.    The dural sinuses are grossly unremarkable, however, overall poorly   opacified.    The regional soft tissues of the neck are unremarkable apart from a   heterogeneous right thyroid lobe nodule measuring 1.4 cm. The lung apices   are clear. There are degenerative changes of the spine.    CAROTID STENOSIS REFERENCE: Percent (%) stenosis is expressed in terms of   NASCET Criteria. (NASCET = 100x1-(N/D)). N=greatest narrowing. D=normal   distal diameter - MILD = <50% stenosis. - MODERATE = 50-69% stenosis. -   SEVERE = 70-89% stenosis. - HAIRLINE/CRITICAL = 90-99% stenosis. -   OCCLUDED = 100% stenosis.    IMPRESSION:    CT HEAD:  No acute intracranial hemorrhage, mass effect, or CT evidence of an acute   vascular territorial infarct. No abnormal intracranial enhancement on   postcontrast images. Mild chronic ischemic changes in the cerebral white   matter.    CTA NECK:  Patent cervical carotid and vertebral arteries. Up to 15% stenosis of the   proximal cervical left internal carotid artery based on NASCET criteria.    CTA HEAD:  No large vessel occlusion, significant stenosis or vascular abnormality   identified.

## 2025-03-12 NOTE — ED ADULT NURSE REASSESSMENT NOTE - NS ED NURSE REASSESS COMMENT FT1
Break RN: Pt received in stretcher outside room 25. Pt still endorsing dizziness with movement. Medicated as per ordered. respiration even and non-labored. in NAD. NSR on monitor. Pending CT
Report rec'd from night RN. Pt a&ox3, remains dizzy at time, medicated as ordered, pt breathing even and unlabored, spo2 100% room air, sinus bradycardic on monitor, denies chest pain, no headache/dizziness, abd soft, non-tender, non-distended, skin cool dry and intact, ivl clear and patent, will continue to monitor.
Pt appears more comfortable on stretcher, still c/o photophobia and dizziness w/ movement. No signs of distress. IVF infusing. Resp. equal and unlabored. CTH pending. Safety precautions maintained.

## 2025-03-12 NOTE — ED CDU PROVIDER INITIAL DAY NOTE - CLINICAL SUMMARY MEDICAL DECISION MAKING FREE TEXT BOX
70-year-old female with past medical history of hypertension, hyperlipidemia, CAD status post stents on aspirin, left breast cancer on anastrozole presents to the ER complaining of room spinning dizziness first began 3 days ago with progressive worsening, taking meclizine at home without relief.  Patient had CTAs performed in the ER with impression no acute intracranial hemorrhage, mass effect patent cervical carotid and vertebral arteries.  No large vessel occlusion, significant stenosis or vascular abnormality identified.  Due to persistent vertiginous symptoms patient was seen by neurology who recommends continued medications and physical therapy evaluation.  Patient still with persistent vertigo but noted some improvement after medications given.  CBC CMP acutely unremarkable.  Plan for CDU continued medications, physical therapy, vital monitoring, general observation, neuro following

## 2025-03-12 NOTE — ED CDU PROVIDER INITIAL DAY NOTE - OBJECTIVE STATEMENT
CDU note 70-year-old female with past medical history of hypertension, hyperlipidemia, CAD status post stents on aspirin, left breast cancer on anastrozole presents to the ER complaining of room spinning dizziness first began 3 days ago with progressive worsening, taking meclizine at home without relief.  Patient had CTAs performed in the ER with impression no acute intracranial hemorrhage, mass effect patent cervical carotid and vertebral arteries.  No large vessel occlusion, significant stenosis or vascular abnormality identified.  Due to persistent vertiginous symptoms patient was seen by neurology who recommends continued medications and physical therapy evaluation.  Patient still with persistent vertigo but noted some improvement after medications given.  CBC CMP acutely unremarkable.  Plan for CDU continued medications, physical therapy, vital monitoring, general observation, neuro following

## 2025-03-12 NOTE — OCCUPATIONAL THERAPY INITIAL EVALUATION ADULT - NSOTDISCHREC_GEN_A_CORE
Anticipating home with no OT needs once symptoms resolve. OT to continue to follow./No skilled OT needs

## 2025-03-12 NOTE — OCCUPATIONAL THERAPY INITIAL EVALUATION ADULT - MANUAL MUSCLE TESTING RESULTS, REHAB EVAL
Bilateral UE grossly at least 5/5 throughout (except right shoulder 4/5, patient reports history of right shoulder arthritis)

## 2025-03-12 NOTE — PHYSICAL THERAPY INITIAL EVALUATION ADULT - ADDITIONAL COMMENTS
Pt states she lives with her  in a house with 3 steps to enter and a flight inside. Prior to admission pt reports being independent in ADLs and ambulation without device.    Following evaluation, pt was left semireclined in bed in no distress, all lines in tact.

## 2025-03-12 NOTE — ED ADULT NURSE NOTE - OBJECTIVE STATEMENT
Received pt AOx4, in no acute distress, appears uncomfortable on stretcher. Pt, PMHx CAD (stents), L breast cancer, presented to the ED c/o room-spinning dizziness associated with photophobia that started yesterday at 6AM without relief from meclizine. States she has a Hx of vertigo. Denies blurry vision, HA, lightheadedness, CP, SOB, palpitations, n/v, jaw pain, vision changes, LE swelling,  abd. pain. Resp. equal and unlabored, no wheezes or crackles, not tachypneic/tachycardic. Abd. soft, non-tender. 20g IV to R AC, + blood flow, patent.  Safety/fall precautions maintained. Family members at bedside.

## 2025-03-12 NOTE — ED PROVIDER NOTE - PHYSICAL EXAMINATION
Resting in bed holding head.  No nystagmus on exam.  PERRLA and EOMI.  Cranial nerves II through XII grossly intact.  Normal finger-to-nose.  Negative Romberg's.  Heart and lung sounds grossly normal to auscultation.  No abdominal tenderness to palpation.  No lower extremity swelling or rash.

## 2025-03-12 NOTE — ED ADULT NURSE NOTE - NSFALLRISKINTERV_ED_ALL_ED

## 2025-03-12 NOTE — OCCUPATIONAL THERAPY INITIAL EVALUATION ADULT - PERTINENT HX OF CURRENT PROBLEM, REHAB EVAL
70-year-old female with past medical history of hypertension, hyperlipidemia, CAD status post stents on aspirin, left breast cancer on anastrozole presents to the ER complaining of room spinning dizziness. CTA H/N negative for acute abnormality. Neurology suspecting peripheral etiology of vertigo.

## 2025-03-12 NOTE — ED PROVIDER NOTE - PROGRESS NOTE DETAILS
Patient with persistent dizziness. Continues to be exacerbated by movement. Has gotten meclizine, Scopalamine patch, reglan without improvement. Will trial valium. Labs reassuring. CTA reads pending. Attempted to stand patient up to walk, but remains too symptomatic to get out of bed. Neuro consulted. Patient Attempted to walk ,still with unsteady, needs assistance. but notes some improvement

## 2025-03-13 ENCOUNTER — TRANSCRIPTION ENCOUNTER (OUTPATIENT)
Age: 70
End: 2025-03-13

## 2025-03-13 DIAGNOSIS — Z29.9 ENCOUNTER FOR PROPHYLACTIC MEASURES, UNSPECIFIED: ICD-10-CM

## 2025-03-13 DIAGNOSIS — R42 DIZZINESS AND GIDDINESS: ICD-10-CM

## 2025-03-13 DIAGNOSIS — I10 ESSENTIAL (PRIMARY) HYPERTENSION: ICD-10-CM

## 2025-03-13 DIAGNOSIS — Z85.3 PERSONAL HISTORY OF MALIGNANT NEOPLASM OF BREAST: ICD-10-CM

## 2025-03-13 DIAGNOSIS — I25.10 ATHEROSCLEROTIC HEART DISEASE OF NATIVE CORONARY ARTERY WITHOUT ANGINA PECTORIS: ICD-10-CM

## 2025-03-13 PROCEDURE — 70551 MRI BRAIN STEM W/O DYE: CPT | Mod: 26

## 2025-03-13 PROCEDURE — 99233 SBSQ HOSP IP/OBS HIGH 50: CPT

## 2025-03-13 PROCEDURE — 99223 1ST HOSP IP/OBS HIGH 75: CPT | Mod: GC

## 2025-03-13 PROCEDURE — 70544 MR ANGIOGRAPHY HEAD W/O DYE: CPT | Mod: 26,59

## 2025-03-13 PROCEDURE — 70547 MR ANGIOGRAPHY NECK W/O DYE: CPT | Mod: 26

## 2025-03-13 RX ORDER — AMLODIPINE BESYLATE 10 MG/1
10 TABLET ORAL DAILY
Refills: 0 | Status: DISCONTINUED | OUTPATIENT
Start: 2025-03-13 | End: 2025-03-16

## 2025-03-13 RX ORDER — METOCLOPRAMIDE HCL 10 MG
10 TABLET ORAL ONCE
Refills: 0 | Status: COMPLETED | OUTPATIENT
Start: 2025-03-13 | End: 2025-03-13

## 2025-03-13 RX ORDER — ANASTROZOLE 1 MG/1
1 TABLET ORAL
Refills: 0 | DISCHARGE

## 2025-03-13 RX ORDER — ATORVASTATIN CALCIUM 80 MG/1
1 TABLET, FILM COATED ORAL
Refills: 0 | DISCHARGE

## 2025-03-13 RX ORDER — DIAZEPAM 2 MG/1
5 TABLET ORAL EVERY 8 HOURS
Refills: 0 | Status: DISCONTINUED | OUTPATIENT
Start: 2025-03-13 | End: 2025-03-16

## 2025-03-13 RX ORDER — ENALAPRIL MALEATE 20 MG
20 TABLET ORAL DAILY
Refills: 0 | Status: DISCONTINUED | OUTPATIENT
Start: 2025-03-13 | End: 2025-03-16

## 2025-03-13 RX ORDER — ASPIRIN 325 MG
81 TABLET ORAL DAILY
Refills: 0 | Status: DISCONTINUED | OUTPATIENT
Start: 2025-03-13 | End: 2025-03-16

## 2025-03-13 RX ORDER — HEPARIN SODIUM 1000 [USP'U]/ML
5000 INJECTION INTRAVENOUS; SUBCUTANEOUS EVERY 12 HOURS
Refills: 0 | Status: DISCONTINUED | OUTPATIENT
Start: 2025-03-13 | End: 2025-03-16

## 2025-03-13 RX ORDER — ANASTROZOLE 1 MG/1
1 TABLET ORAL DAILY
Refills: 0 | Status: DISCONTINUED | OUTPATIENT
Start: 2025-03-13 | End: 2025-03-16

## 2025-03-13 RX ORDER — ONDANSETRON HCL/PF 4 MG/2 ML
4 VIAL (ML) INJECTION ONCE
Refills: 0 | Status: COMPLETED | OUTPATIENT
Start: 2025-03-13 | End: 2025-03-13

## 2025-03-13 RX ORDER — ONDANSETRON HCL/PF 4 MG/2 ML
4 VIAL (ML) INJECTION EVERY 6 HOURS
Refills: 0 | Status: DISCONTINUED | OUTPATIENT
Start: 2025-03-13 | End: 2025-03-14

## 2025-03-13 RX ADMIN — DIAZEPAM 5 MILLIGRAM(S): 2 TABLET ORAL at 09:17

## 2025-03-13 RX ADMIN — DIAZEPAM 5 MILLIGRAM(S): 2 TABLET ORAL at 17:31

## 2025-03-13 RX ADMIN — Medication 10 MILLIGRAM(S): at 09:17

## 2025-03-13 RX ADMIN — ATORVASTATIN CALCIUM 40 MILLIGRAM(S): 80 TABLET, FILM COATED ORAL at 22:02

## 2025-03-13 RX ADMIN — ATORVASTATIN CALCIUM 40 MILLIGRAM(S): 80 TABLET, FILM COATED ORAL at 01:39

## 2025-03-13 RX ADMIN — Medication 4 MILLIGRAM(S): at 13:35

## 2025-03-13 RX ADMIN — Medication 25 MILLIGRAM(S): at 06:11

## 2025-03-13 RX ADMIN — Medication 75 MILLILITER(S): at 17:33

## 2025-03-13 RX ADMIN — HEPARIN SODIUM 5000 UNIT(S): 1000 INJECTION INTRAVENOUS; SUBCUTANEOUS at 17:31

## 2025-03-13 RX ADMIN — ANASTROZOLE 1 MILLIGRAM(S): 1 TABLET ORAL at 15:15

## 2025-03-13 RX ADMIN — DIAZEPAM 5 MILLIGRAM(S): 2 TABLET ORAL at 01:39

## 2025-03-13 RX ADMIN — SCOPOLAMINE 1 PATCH: 1 PATCH, EXTENDED RELEASE TRANSDERMAL at 06:05

## 2025-03-13 RX ADMIN — SCOPOLAMINE 1 PATCH: 1 PATCH, EXTENDED RELEASE TRANSDERMAL at 19:17

## 2025-03-13 RX ADMIN — Medication 25 MILLIGRAM(S): at 15:15

## 2025-03-13 NOTE — PROGRESS NOTE ADULT - SUBJECTIVE AND OBJECTIVE BOX
Neurology Progress Note    S: Patient seen and examined. Still dizzy     Medication:  amLODIPine   Tablet 10 milliGRAM(s) Oral daily  anastrozole 1 milliGRAM(s) Oral daily  aspirin  chewable 81 milliGRAM(s) Oral daily  atorvastatin 40 milliGRAM(s) Oral at bedtime  chlorhexidine 2% Cloths 1 Application(s) Topical daily  diazepam    Tablet 5 milliGRAM(s) Oral every 8 hours PRN  enalapril 20 milliGRAM(s) Oral daily  heparin   Injectable 5000 Unit(s) SubCutaneous every 12 hours  meclizine 25 milliGRAM(s) Oral every 8 hours PRN  ondansetron Injectable 4 milliGRAM(s) IV Push every 6 hours PRN  scopolamine 1 mG/72 Hr(s) Patch 1 Patch Transdermal every 72 hours  sodium chloride 0.9%. 1000 milliLiter(s) IV Continuous <Continuous>      Vitals:  Vital Signs Last 24 Hrs  T(C): 36.8 (13 Mar 2025 21:36), Max: 36.8 (13 Mar 2025 01:47)  T(F): 98.2 (13 Mar 2025 21:36), Max: 98.2 (13 Mar 2025 01:47)  HR: 55 (13 Mar 2025 21:36) (50 - 60)  BP: 141/65 (13 Mar 2025 21:36) (120/52 - 145/46)  BP(mean): --  RR: 18 (13 Mar 2025 21:36) (15 - 18)  SpO2: 99% (13 Mar 2025 21:36) (86% - 99%)    Parameters below as of 13 Mar 2025 21:36  Patient On (Oxygen Delivery Method): room air        General Exam:   General Appearance: Appropriately dressed and in no acute distress       Head: Normocephalic, atraumatic and no dysmorphic features  Ear, Nose, and Throat: Moist mucous membranes  CVS: S1S2+  Resp: No SOB, no wheeze or rhonchi  Abd: soft NTND  Extremities: No edema, no cyanosis  Skin: No bruises, no rashes     Neurologic Exam:  Mental status - Awake, Alert, Oriented to person, place, and time. Speech fluent, repetition and naming intact. Follows 1 and 2 step crossed commands     Cranial nerves - PERRLA, VFF, EOMI, face sensation (V1-V3) intact b/l, facial strength intact without asymmetry b/l, hearing intact b/l, palate with symmetric elevation, trapezius 5/5 strength b/l, tongue midline on protrusion with full lateral movement    Motor - Normal bulk and tone throughout. No pronator drift.  Strength testing- grossly 5/5 throughout     Sensation - Light touch intact throughout    DTR's -             Biceps      Triceps     Brachioradialis      Patellar    Ankle    Toes/plantar response  R             2+             2+                  2+                       2+            2+                 Down  L              2+             2+                 2+                        2+           2+                 Down    Coordination - Finger to Nose and heel to shin intact b/l.     Gait and station - Deferred due to fall risk       I personally reviewed the below data/images/labs:      CBC Full  -  ( 12 Mar 2025 01:15 )  WBC Count : 8.98 K/uL  RBC Count : 4.14 M/uL  Hemoglobin : 12.4 g/dL  Hematocrit : 37.6 %  Platelet Count - Automated : 243 K/uL  Mean Cell Volume : 90.8 fL  Mean Cell Hemoglobin : 30.0 pg  Mean Cell Hemoglobin Concentration : 33.0 g/dL  Auto Neutrophil # : x  Auto Lymphocyte # : x  Auto Monocyte # : x  Auto Eosinophil # : x  Auto Basophil # : x  Auto Neutrophil % : x  Auto Lymphocyte % : x  Auto Monocyte % : x  Auto Eosinophil % : xnn  Auto Basophil % : x    03-12    142  |  107  |  15  ----------------------------<  132[H]  4.4   |  22  |  0.68    Ca    9.5      12 Mar 2025 01:15    TPro  7.3  /  Alb  4.0  /  TBili  0.3  /  DBili  x   /  AST  19  /  ALT  12  /  AlkPhos  87  03-12    LIVER FUNCTIONS - ( 12 Mar 2025 01:15 )  Alb: 4.0 g/dL / Pro: 7.3 g/dL / ALK PHOS: 87 U/L / ALT: 12 U/L / AST: 19 U/L / GGT: x           PT/INR - ( 12 Mar 2025 01:15 )   PT: 10.7 sec;   INR: 0.90 ratio         PTT - ( 12 Mar 2025 01:15 )  PTT:23.1 sec  Urinalysis Basic - ( 12 Mar 2025 04:47 )    Color: Yellow / Appearance: Clear / S.024 / pH: x  Gluc: x / Ketone: Negative mg/dL  / Bili: Negative / Urobili: 0.2 mg/dL   Blood: x / Protein: Negative mg/dL / Nitrite: Negative   Leuk Esterase: Negative / RBC: x / WBC x   Sq Epi: x / Non Sq Epi: x / Bacteria: x    FINDINGS:    CT BRAIN:    There is no acute intra-axial or extra-axial hemorrhage. There is no mass   effect or shift of the midline. The basal cisterns are not effaced. There   is mild cerebral and cerebellar volume loss with prominence of the   ventricles, sulci, and cerebellar folia. A CSF filled sella turcica is   noted. There are mild chronic ischemic changes in the cerebral white   matter. There is no CT evidence of an acute vascular territorial infarct.   There is no abnormal intracranial enhancement on postcontrast images.   There are atherosclerotic calcifications of the intracranial carotid   arteries and intradural vertebral arteries.    The regional soft tissues andosseous structures are unremarkable. The   visualized paranasal sinuses and tympanic/mastoid cavities are clear   apart from mild bilateral ethmoid mucosal thickening.      CTA HEAD/NECK:    There is a three-vessel aortic arch. There are atherosclerotic   calcifications of the aortic arch. The common carotid arteries are patent   from the origins to the bifurcations. There is moderate atherosclerotic   calcification at the left carotid bifurcation. There is up to 15%   stenosis of the proximal cervical left internal carotid artery based on   NASCET criteria. There is no stenosis of the cervical right internal   carotid artery based on NASCET criteria. The cervical vertebral arteries   are patent from the origins to the skull base. The right vertebral artery   is dominant.    The skull base and intracranial carotid arteries are patent without   high-grade stenosis. There is mild luminal narrowing of the cavernous   segments secondary to atherosclerotic calcification. The proximal MCAs   and ACAs are patent without significant stenosis. The anterior   communicating artery is visualized. Distal MITCHELL and MCA branches are   grossly symmetric.    The skull base and intradural vertebral arteries and basilar artery are   patent without high-grade stenosis. There is short segment mild luminal   narrowing of the intradural right vertebral artery secondary to   atherosclerotic calcification. The proximal PCAs are patent without   significant stenosis. There is a dominant right posterior communicating   arteries supplying the right PCA. The superior cerebellar artery origins,   bilateral AICAs, and bilateral PICAs are identified.    There is no evidence of an intracranial arterial aneurysm or   arteriovenous malformation on CTA.    The dural sinuses are grossly unremarkable, however, overall poorly   opacified.    The regional soft tissues of the neck are unremarkable apart from a   heterogeneous right thyroid lobe nodule measuring 1.4 cm. The lung apices   are clear. There are degenerative changes of the spine.    CAROTID STENOSIS REFERENCE: Percent (%) stenosis is expressed in terms of   NASCET Criteria. (NASCET = 100x1-(N/D)). N=greatest narrowing. D=normal   distal diameter - MILD = <50% stenosis. - MODERATE = 50-69% stenosis. -   SEVERE = 70-89% stenosis. - HAIRLINE/CRITICAL = 90-99% stenosis. -   OCCLUDED = 100% stenosis.    IMPRESSION:    CT HEAD:  No acute intracranial hemorrhage, mass effect, or CT evidence of an acute   vascular territorial infarct. No abnormal intracranial enhancement on   postcontrast images. Mild chronic ischemic changes in the cerebral white   matter.    CTA NECK:  Patent cervical carotid and vertebral arteries. Up to 15% stenosis of the   proximal cervical left internal carotid artery based on NASCET criteria.    CTA HEAD:  No large vessel occlusion, significant stenosis or vascular abnormality   identified.        < from: MR Angio Head No Cont (25 @ 12:29) >    ACC: [id number] EXAM:  MR ANGIO BRAIN   ORDERED BY: ROX WILLIAMSON     ACC: [id number] EXAM:  MR ANGIO NECK   ORDERED BY: ROX WILLIAMSON     ACC: [id number] EXAM:  MR BRAIN   ORDERED BY: ROX WILLIAMSON     PROCEDURE DATE:  2025          INTERPRETATION:  CLINICAL STATEMENT: Vertigo    TECHNIQUE: MRI of the brain without gadolinium. MRA of the head without   gadolinium. MRA of the neck without gadolinium.    COMPARISON: CT head and CTA head and neck 3/12/2025    FINDINGS:  MRI of the brain:  There are nonspecific T2 prolongation signal abnormalities in the   periventricular subcortical white matter likely related to minimal   chronic microvascular ischemic changes.    There is no acute parenchymal hemorrhage, parenchymal mass, mass effect   or midline shift. There is no extra-axial fluid collection.  There is no   hydrocephalus.  There is no acute infarct. Partial empty sella noted.    The visualized paranasal sinuses are well aerated.    MRA of the head:  The proximal anterior, middle and posterior cerebral arteries are patent.   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 Fetal origin   right posterior cerebral artery.    Focal stenosis at the A1 segment of right anterior cerebral artery   appearance likely artifactual.    The intracranial vertebral and basilar arteries are patent.    There is no intracranial aneurysm.    MRA of the neck:  The visualized common carotid and internal carotid arteries are patent.    Mild stenosis at origin left internal carotid artery unchanged.    The visualized extracranial vertebral arteries are patent. Dominant right   vertebral artery noted        IMPRESSION:  No acute intracranial hemorrhage or acute infarct.    Minimal chronic microvascular ischemic changes    No intracranial hemodynamically significant stenosis    Stable mild (less than 50%) narrowing at the origin of left internal   carotid artery    --- End of Report ---            SILVANO VAZQUEZ MD; Attending Radiologist  This document has been electronically signed. Mar 13 2025 11:38AM    < end of copied text >

## 2025-03-13 NOTE — ED CDU PROVIDER SUBSEQUENT DAY NOTE - CLINICAL SUMMARY MEDICAL DECISION MAKING FREE TEXT BOX
Pt is a 71 YO F with PMH HTN, HLD, CAD (stents, on ASA), L beast CA (on anastrozole) who presented to ED with room spinning dizziness x 3 days. In ED, pt without electrolyte derangement, CT without acute findings. Pt was seen and examined by Neurology who recommended supportive care.

## 2025-03-13 NOTE — H&P ADULT - PROBLEM SELECTOR PLAN 1
PLAN  Neurology believe symptoms, likely peripheral etiology of vertigo. Possibly localizing to left ear dysfunction. Low concern for posterior circulation dysfunction  PT recommend outpatient vestibular physical therapy  - Patient may benefit from inpatient [facility]  - Symptomatic management with Meclizine 25mg TID PRN (up to 1 week) or consider diazepam and zofran PRN   - Outpatient vestibular rehab and ENT follow-up post discharge   - Neuro-checks and VS q4h  - BP goal normotension 120/80   - EKG, telemetry, orthostatic vitals   - Fall precautions PLAN  Neurology believes history and symptoms most consistent with peripheral etiology of vertigo. Possibly localizing to left ear dysfunction. Low concern for posterior circulation dysfunction  PT recommend outpatient vestibular physical therapy  - Patient may benefit from inpatient [facility]  - Symptomatic management with Meclizine 25mg TID PRN (up to 1 week) or consider diazepam and zofran PRN   - Outpatient vestibular rehab and ENT follow-up post discharge   - Neuro-checks and VS q4h  - BP goal normotension 120/80   - EKG, telemetry, orthostatic vitals   - Fall precautions

## 2025-03-13 NOTE — DISCHARGE NOTE PROVIDER - NSDCCPTREATMENT_GEN_ALL_CORE_FT
PRINCIPAL PROCEDURE  Procedure: CT angiogram head and neck vessels w contrast  Findings and Treatment: IMPRESSION:  CT HEAD:  No acute intracranial hemorrhage, mass effect, or CT evidence of an acute   vascular territorial infarct. No abnormal intracranial enhancement on   postcontrast images. Mild chronic ischemic changes in the cerebral white   matter.  CTA NECK:  Patent cervical carotid and vertebral arteries. Up to 15% stenosis of the   proximal cervical left internal carotid artery based on NASCET criteria.  CTA HEAD:  No large vessel occlusion, significant stenosis or vascular abnormality   identified.        SECONDARY PROCEDURE  Procedure: MR angio head wo con  Findings and Treatment: MRA of the head:  The proximal anterior, middle and posterior cerebral arteries are patent.   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 Fetal origin   right posterior cerebral artery.  Focal stenosis at the A1 segment of right anterior cerebral artery   appearance likely artifactual.  The intracranial vertebral and basilar arteries are patent.  There is no intracranial aneurysm.  MRA of the neck:  The visualized common carotid and internal carotid arteries are patent.  Mild stenosis at origin left internal carotid artery unchanged.  The visualized extracranial vertebral arteries are patent. Dominant right   vertebral artery noted  IMPRESSION:  No acute intracranial hemorrhage or acute infarct.  Minimal chronic microvascular ischemic changes  No intracranial hemodynamically significant stenosis  Stable mild (less than 50%) narrowing at the origin of left internal   carotid artery       PRINCIPAL PROCEDURE  Procedure: CT angiogram head and neck vessels w contrast  Findings and Treatment: IMPRESSION:  CT HEAD:  No acute intracranial hemorrhage, mass effect, or CT evidence of an acute   vascular territorial infarct. No abnormal intracranial enhancement on   postcontrast images. Mild chronic ischemic changes in the cerebral white   matter.  CTA NECK:  Patent cervical carotid and vertebral arteries. Up to 15% stenosis of the   proximal cervical left internal carotid artery based on NASCET criteria.  CTA HEAD:  No large vessel occlusion, significant stenosis or vascular abnormality   identified.        SECONDARY PROCEDURE  Procedure: MR angio head wo con  Findings and Treatment: MRI of the brain:  There are nonspecific T2 prolongation signal abnormalities in the   periventricular subcortical white matter likely related to minimal   chronic microvascular ischemic changes.  There is no acute parenchymal hemorrhage, parenchymal mass, mass effect   or midline shift. There is no extra-axial fluid collection.  There is no   hydrocephalus.  There is no acute infarct. Partial empty sella noted.  The visualized paranasal sinuses are well aerated.  MRA of the head:  The proximal anterior, middle and posterior cerebral arteries are patent.   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 Fetal origin   right posterior cerebral artery.  Focal stenosis at the A1 segment of right anterior cerebral artery   appearance likely artifactual.  The intracranial vertebral and basilar arteries are patent.  There is no intracranial aneurysm.  MRA of the neck:  The visualized common carotid and internal carotid arteries are patent.  Mild stenosis at origin left internal carotid artery unchanged.  The visualized extracranial vertebral arteries are patent. Dominant right   vertebral artery noted  IMPRESSION:  No acute intracranial hemorrhage or acute infarct.  Minimal chronic microvascular ischemic changes  No intracranial hemodynamically significant stenosis  Stable mild (less than 50%) narrowing at the origin of left internal   carotid artery

## 2025-03-13 NOTE — H&P ADULT - NSHPLABSRESULTS_GEN_ALL_CORE
LABS: When present labs, imaging, and ECG were personally reviewed                          12.4   8.98  )-----------( 243      ( 12 Mar 2025 01:15 )             37.6       03-12    142  |  107  |  15  ----------------------------<  132[H]  4.4   |  22  |  0.68    Ca    9.5      12 Mar 2025 01:15    TPro  7.3  /  Alb  4.0  /  TBili  0.3  /  DBili  x   /  AST  19  /  ALT  12  /  AlkPhos  87  03-12       LIVER FUNCTIONS - ( 12 Mar 2025 01:15 )  Alb: 4.0 g/dL / Pro: 7.3 g/dL / ALK PHOS: 87 U/L / ALT: 12 U/L / AST: 19 U/L / GGT: x                    Urinalysis Basic - ( 12 Mar 2025 04:47 )    Color: Yellow / Appearance: Clear / S.024 / pH: x  Gluc: x / Ketone: Negative mg/dL  / Bili: Negative / Urobili: 0.2 mg/dL   Blood: x / Protein: Negative mg/dL / Nitrite: Negative   Leuk Esterase: Negative / RBC: x / WBC x   Sq Epi: x / Non Sq Epi: x / Bacteria: x        PT/INR - ( 12 Mar 2025 01:15 )   PT: 10.7 sec;   INR: 0.90 ratio         PTT - ( 12 Mar 2025 01:15 )  PTT:23.1 sec    Lactate Trend        CAPILLARY BLOOD GLUCOSE      POCT Blood Glucose.: 129 mg/dL (12 Mar 2025 00:38)      RADIOLOGY & ADDITIONAL TESTS:   MRA of the head:  The proximal anterior, middle and posterior cerebral arteries are patent.   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 Fetal origin   right posterior cerebral artery.    Focal stenosis at the A1 segment of right anterior cerebral artery   appearance likely artifactual.    The intracranial vertebral and basilar arteries are patent.    There is no intracranial aneurysm.    MRA of the neck:  The visualized common carotid and internal carotid arteries are patent.    Mild stenosis at origin left internal carotid artery unchanged.    The visualized extracranial vertebral arteries are patent. Dominant right   vertebral artery noted      IMPRESSION:  No acute intracranial hemorrhage or acute infarct.    Minimal chronic microvascular ischemic changes    No intracranial hemodynamically significant stenosis    Stable mild (less than 50%) narrowing at the origin of left internal   carotid artery    CT HEAD:  No acute intracranial hemorrhage, mass effect, or CT evidence of an acute   vascular territorial infarct. No abnormal intracranial enhancement on   postcontrast images. Mild chronic ischemic changes in the cerebral white   matter.    CTA NECK:  Patent cervical carotid and vertebral arteries. Up to 15% stenosis of the   proximal cervical left internal carotid artery based on NASCET criteria.    CTA HEAD:  No large vessel occlusion, significant stenosis or vascular abnormality   identified.

## 2025-03-13 NOTE — H&P ADULT - PROBLEM SELECTOR PLAN 2
- cont home medications  - hold metoprolol and spironolactone in recent setting of bradycardic events (HR to 47), will cont to monitor vitals - cont home medications  - hold metoprolol and spironolactone in recent setting of bradycardic events (HR to 49), will cont to monitor vitals

## 2025-03-13 NOTE — DISCHARGE NOTE PROVIDER - NSDCFUSCHEDAPPT_GEN_ALL_CORE_FT
Morena Valenica  [facility]  [address]STR[address] [id number] [facility]  Scheduled Appointment: 05/06/2025     Morena Valencia  [facility]  [address]STR[address] [id number] [facility]  Scheduled Appointment: 05/06/2025    Piter Tolentino  [facility]  CARDIOLOGY [id number]  Scheduled Appointment: 06/30/2025

## 2025-03-13 NOTE — DISCHARGE NOTE PROVIDER - NSDCCPCAREPLAN_GEN_ALL_CORE_FT
PRINCIPAL DISCHARGE DIAGNOSIS  Diagnosis: Vertigo  Assessment and Plan of Treatment:      PRINCIPAL DISCHARGE DIAGNOSIS  Diagnosis: Vertigo  Assessment and Plan of Treatment: You came to the hospital with symptoms of vertigo, which is a sensation of spinning or dizziness. While you were admitted, you had imaging of your head and neck with CT scan and MRI, that did not show any signs of stroke, bleeding, or other serious brain abnormalities. Based on your symptoms and test results, the Neurology team determined that your vertigo is likely due to a peripheral cause, meaning it is related to the inner ear rather than the brain. You were also evaluated by the Physical Therapy team, who recommended that you continue with outpatient vestibular physical therapy to improve your balance and dizziness.   You will be discharged home with these medications *INCOMPLETE* which you should take as instructed.   Please make an appointment to follow up an Otolaryngoloist or ENT for further evaluation. If you dizziness suddenly gets much worse, you have trouble walking, develop weakness on one side of your body, have slurred speech, severe headaches, or changes in vision, you should return to the emergency room right away.     PRINCIPAL DISCHARGE DIAGNOSIS  Diagnosis: Vertigo  Assessment and Plan of Treatment: You came to the hospital with symptoms of vertigo, which is a sensation of spinning or dizziness. While you were admitted, you had imaging of your head and neck with CT scan and MRI, that did not show any signs of stroke, bleeding, or other serious brain abnormalities. Based on your symptoms and test results, the Neurology team determined that your vertigo is likely due to a peripheral cause, meaning it is related to the inner ear rather than the brain. You were also evaluated by the Physical Therapy team, who recommended that you continue with outpatient vestibular physical therapy to improve your balance and dizziness. While you were here we treated you with valium, reglan, meclizine, zofran, and scopolamine. We will discharge you on Zofran and Meclizine please take these as prescribed.   Please make an appointment to follow up an Otolaryngoloist or ENT for further evaluation, please also follow up with vestibular rehab. If you dizziness suddenly gets much worse, you have trouble walking, develop weakness on one side of your body, have slurred speech, severe headaches, or changes in vision, you should return to the emergency room right away.      SECONDARY DISCHARGE DIAGNOSES  Diagnosis: Bradycardia  Assessment and Plan of Treatment: Your heart rate was slow and we did an EKG and thyroid testing neither of which were notable we will hold your metoprolol and we recommend you follow up with your cardiologist outside the hospital.

## 2025-03-13 NOTE — H&P ADULT - ATTENDING COMMENTS
70 year old female with hx/o hypertension, hyperlipidemia, CAD s/p stent (most recent 2022 on aspirin and plavix), vertigo last episode about 5 yrs ago,  left breast cancer s/p lumpectomy on anastrozole who p/w acute positional dizziness and nausea . Based on history and physical exam concern is for BPPV, CTH neg and normal neuro exam.    -obtain orthostatics   -symptom management as stated above  -neuro recs appreciated   -PT eval     Rest of care as stated above

## 2025-03-13 NOTE — DISCHARGE NOTE PROVIDER - NSDCMRMEDTOKEN_GEN_ALL_CORE_FT
amLODIPine 2.5 mg oral tablet: 1 tab(s) orally  anastrozole 1 mg oral tablet: 1 tab(s) orally once a day  aspirin 81 mg oral tablet: 1 tab(s) orally once a day  atorvastatin 40 mg oral tablet: 1 tab(s) orally once a day  enalapril 20 mg oral tablet: 1 tab(s) orally  metoprolol succinate 25 mg oral tablet, extended release: 1 tab(s) orally  spironolactone 25 mg oral tablet: 1 tab(s) orally   anastrozole 1 mg oral tablet: 1 tab(s) orally once a day  aspirin 81 mg oral tablet: 1 tab(s) orally once a day  atorvastatin 40 mg oral tablet: 1 tab(s) orally once a day  enalapril 20 mg oral tablet: 1 tab(s) orally  metoprolol succinate 25 mg oral tablet, extended release: 1 tab(s) orally  spironolactone 25 mg oral tablet: 1 tab(s) orally   amLODIPine 10 mg oral tablet: 1 tab(s) orally once a day  anastrozole 1 mg oral tablet: 1 tab(s) orally once a day  aspirin 81 mg oral tablet: 1 tab(s) orally once a day  atorvastatin 40 mg oral tablet: 1 tab(s) orally once a day  enalapril 20 mg oral tablet: 1 tab(s) orally  metoprolol succinate 25 mg oral tablet, extended release: 1 tab(s) orally  spironolactone 25 mg oral tablet: 1 tab(s) orally   amLODIPine 10 mg oral tablet: 1 tab(s) orally once a day  anastrozole 1 mg oral tablet: 1 tab(s) orally once a day  aspirin 81 mg oral tablet: 1 tab(s) orally once a day  atorvastatin 40 mg oral tablet: 1 tab(s) orally once a day  enalapril 20 mg oral tablet: 1 tab(s) orally once a day  meclizine 12.5 mg oral tablet: 1 tab(s) orally every 6 hours as needed for  dizziness  ondansetron 4 mg oral tablet: 1 tab(s) orally every 8 hours as needed for  dizziness  Vestibular Therapy as needed ICD R42(Dizziness): Vestibular Therapy as needed ICD R42(Dizziness)

## 2025-03-13 NOTE — H&P ADULT - PROBLEM SELECTOR PLAN 5
- pt on eliquis, no need for additional DVT prophylaxis - pt on aspirin, no need for additional DVT prophylaxis - pt on aspirin, no need for additional DVT prophylaxis  - encourage OOB as tolerated

## 2025-03-13 NOTE — H&P ADULT - ASSESSMENT
70 year old female with hx/o hypertension, hyperlipidemia, CAD s/p stent (most recent 2022 on aspirin and plavix), vertigo,  left breast cancer s/p lumpectomy on anastrozole who presents with 3d of acute positional dizziness and nausea.

## 2025-03-13 NOTE — ED CDU PROVIDER SUBSEQUENT DAY NOTE - NS ED ATTENDING STATEMENT MOD
I have seen and examined this patient and fully participated in the care of this patient as the teaching attending.  The service was shared with the JOSEFINA.  I reviewed and verified the documentation.

## 2025-03-13 NOTE — H&P ADULT - NSHPPHYSICALEXAM_GEN_ALL_CORE
GENERAL: NAD, lying in bed comfortably  HEAD:  Atraumatic, normocephalic  EYES: EOMI, PERRLA, conjunctiva and sclera clear  NECK: Supple, trachea midline, no JVD  HEART: Regular rate and rhythm, no murmurs, rubs, or gallops  LUNGS: Unlabored respirations.  Clear to auscultation bilaterally, no crackles, wheezing, or rhonchi  ABDOMEN: Soft, nontender, nondistended, +BS  EXTREMITIES: 2+ peripheral pulses bilaterally, cap refill<2 secs. No clubbing, cyanosis, or edema  NERVOUS SYSTEM:  A&Ox3, following commands, moving all extremities, no focal deficits   SKIN: No rashes or lesions GENERAL: NAD, lying in bed comfortably  HEAD:  Atraumatic, normocephalic, CN II-XII normal  EYES: EOMI, PERRLA, conjunctiva and sclera clear  NECK: Supple, trachea midline, no JVD  HEART: Regular rate and rhythm, no murmurs, rubs, or gallops  LUNGS: Unlabored respirations.  Clear to auscultation bilaterally, no crackles, wheezing, or rhonchi  ABDOMEN: Soft, nontender, nondistended, +BS  EXTREMITIES: 2+ peripheral pulses bilaterally, cap refill<2 secs. No clubbing, cyanosis, or edema  NERVOUS SYSTEM:  A&Ox3, following commands, moving all extremities spontaneously, no focal deficits   SKIN: No rashes or lesions GENERAL: NAD, lying in bed comfortably  HEAD:  Atraumatic, normocephalic, CN II-XII normal  EYES: EOMI, PERRLA, conjunctiva and sclera clear  NECK: Supple, trachea midline, no JVD  HEART: Regular rate and rhythm, no murmurs, rubs, or gallops  LUNGS: Unlabored respirations.  Clear to auscultation bilaterally, no crackles, wheezing, or rhonchi  ABDOMEN: Soft, nontender, nondistended, +BS  EXTREMITIES: 2+ peripheral pulses bilaterally, cap refill<2 secs. No clubbing, cyanosis, or edema  NERVOUS SYSTEM:  A&Ox3, following commands, moving all extremities spontaneously, no focal deficits, motor/strength 5/5 b/l UE/LE, coordination intact, gait unable to be assessed  SKIN: No rashes or lesions

## 2025-03-13 NOTE — ED CDU PROVIDER SUBSEQUENT DAY NOTE - HISTORY
Pt is a 71 YO F with PMH HTN, HLD, CAD (stents, on ASA), L beast CA (on anastrozole) who presented to ED with room spinning dizziness x 3 days. In ED, pt without electrolyte derangement, CT without acute findings. Pt was seen and examined by Neurology who recommended supportive care.  In the interim- pt reports still with dizziness and unsteady gait. Pt offered MRI imaging however would like to trial a few doses of valium first.

## 2025-03-13 NOTE — DISCHARGE NOTE PROVIDER - NSFOLLOWUPCLINICS_GEN_ALL_ED_FT
[facility] - ENT  Otolaryngology (ENT)  [address] [place] [address]  Phone: [phone]  Fax:   Follow Up Time: 1 week    [facility]  Neurology  [address] [place] [address] [address] [place] [address]  Phone: [phone]  Fax:   Follow Up Time: 1 week

## 2025-03-13 NOTE — ED CDU PROVIDER SUBSEQUENT DAY NOTE - ATTENDING CONTRIBUTION TO CARE
The decision was made to admit this patient to the CDU as documented in my Provider note I have reviewed the note  written by the CDU Physician Assistant. I have supervised and participated as necessary in the performance of procedures indicated for patient management and was available at all phases of the patient´s visit when needed.    Please see the  provider note for the details of the decision to admit  Vital Signs Last 24 Hrs  T(F): 98.2 HR: 50 BP: 145/46 RR: 16 SpO2: 97% (13 Mar 2025 05:49)   PE on rounds patient noted to be retching vomiting c/o severe dizziness   Pt seen by neuro await reassessment of vertigo no MRIs results of imaging ordered as pt wanted to see how medical therapy went   Still could be peripheral but given intractable nature will MRI treat aggressivel (reglan benzos meclizine apap) and monitor patient response to therapy  Will reassess   If unable to tolerate PO will need admission

## 2025-03-13 NOTE — ED CDU PROVIDER DISPOSITION NOTE - CLINICAL COURSE
70-year-old female with past medical history of hypertension, hyperlipidemia, CAD status post stents on aspirin, left breast cancer on anastrozole presents to the ER complaining of room spinning dizziness first began 3 days ago with progressive worsening, taking meclizine at home without relief.  Patient had CTAs performed in the ER with impression no acute intracranial hemorrhage, mass effect patent cervical carotid and vertebral arteries.  No large vessel occlusion, significant stenosis or vascular abnormality identified.  Due to persistent vertiginous symptoms patient was seen by neurology who recommends continued medications and physical therapy evaluation.  Patient still with persistent vertigo but noted some improvement after medications given.  CBC CMP acutely unremarkable.   While in CDU, dizziness persistent despite multiple medications. Pt with unsteady gait 2/2 dizziness. Spoke to hospitalist for admission, pt has not been evaluated by PT due to dizziness. Will admit at this time. Pt agrees with plan and amenable to admission.

## 2025-03-13 NOTE — PATIENT PROFILE ADULT - FUNCTIONAL ASSESSMENT - BASIC MOBILITY SECTION LABEL
Physical Therapy    Visit Type: treatment    Relevant History/Co-morbidities: A 55 year old male admitted to hospital with c/o left le cludication and coldness underwent  left le angiogram and thromboendarterectomy with wound vac placement.     Patient is cleared by RN for PT treatment    SUBJECTIVE  \"Hey how's it going\"      OBJECTIVE     Cognitive Status   Level of Consciousness   - alert  Orientation    - Oriented to: person, place, time and situation  Functional Communication   - Overall Communication Status: within functional limits   - Forms of Communication: verbal  Attention Span    - Attention: intact  Following Direction   - follows all commands and directions consistently  Transition Between Tasks   - transitions without difficulty  Memory   - intact  Safety Awareness/Insight   - intact  Awareness of Deficits   - fully aware of deficits        Transfers  Assistive devices: gait belt, 2-wheeled walker  - Sit to stand: supervision  - Stand to sit: supervision    Ambulation / Gait  - Assistive device: gait belt and bilateral crutches  - Distance (feet unless otherwise indicated): 30  - Assist Level: supervision  - Surface: even    Stair Ambulation  - Number of steps: 4;   - Assist Level: supervision  - Railing: none  - Pattern: step-to  - Devices Used: bilateral crutches  Second Trial  - Number of steps: 4  - Assist Level: supervision  - Railing: bilateral  - Pattern: step-to  - Devices Used: none              ASSESSMENT   Progress: progressing toward goals    Discharge needs based on today's assessment:   - Therapy needs at discharge: therapy 1-3 times per week    AM-PAC  - Generalized Prior Level of Function: IND/MOD I ([facility] 22-24)       Key: MOD A=moderate assistance, IND/MOD I=independent/modified independent  - Generalized Current Level of Function     - Current Mobility Score: 18       AM-PAC Scoring Key= >21 Modified Independent; 20-21 Supervision; 18-19 Minimal assist; 13-17 Moderate assist; 9-12  Max assist; <9 Total assist      PLAN (while hospitalized)  Suggestions for next session as indicated:   PT Frequency: 3-5 x per week      PT/OT Mobility Equipment for Discharge: owns a 2 WW and crutches      Documented in the chart in the following areas: Assessment/Plan.      Patient at End of Session:   Location: in chair  Safety measures: call light within reach  Handoff to: nurse      Therapy procedure time and total treatment time can be found documented on the Time Entry flowsheet   .

## 2025-03-13 NOTE — H&P ADULT - HISTORY OF PRESENT ILLNESS
70 year old female with hx/o hypertension, hyperlipidemia, CAD s/p stent (most recent 2022 on aspirin and plavix), vertigo,  left breast cancer on anastrozole who presents with dizziness. Patient was in her usual state of health until Sunday night at 9Pm when she went to bed. Monday morning patient says she was turning her head to her right side and had sudden onset room spinning dizziness. She had nausea and was dry-heaving. Dizziness is worse with movement, more severe since last night and she has difficulty walking. Patient states symptoms are similar to her previous episodes of vertigo, but unrelieved with meclizine at home.  Her last severe vertigo attack was in 2015. MR brain in 2019 did not show any infarcts. Patient states she now has a frontal headache. Denies any vision disturbance,, focal weakness or numbness. She reports feeling sensation of water in her left ear but denies any ear pain, hearing loss or tinnitus. No falls or head strike.    CTH and CTA in ED were unrevealing for acute infarct or significant posterior circulation stenosis. MR head no acute intracranial hemorrhage or acute infarct.. Minimal chronic microvascular ischemic changes. No intracranial hemodynamically significant stenosis. Stable mild (less than 50%) narrowing at the origin of left internal carotid artery    In the ED, she received meclizine 25mg x1, reglan x1, zofran x 1, scopolamine patch without much improvement in her symptoms. She reports diazepam 5mg gave her some mild relief.     Patient admitted to CDU and evaluated by Neurology and PT. Neurology believe symptoms, likely peripheral etiology of vertigo. Possibly localizing to left ear dysfunction. Low concern for posterior circulation dysfunction. PT recommend outpatient vestibular physical therapy.   70 year old female with hx/o hypertension, hyperlipidemia, CAD s/p stent (most recent 2022 on aspirin), vertigo,  left breast cancer s/p lumpectomy on anastrozole who presents with dizziness. Patient was in her usual state of health until Sunday night at 9Pm when she went to bed. Monday morning patient says she was turning her head to her right side and had sudden onset room spinning dizziness. She had nausea and was dry-heaving. Dizziness is worse with movement, more severe since last night and she has difficulty walking. Patient states symptoms are similar to her previous episodes of vertigo, but unrelieved with meclizine at home.  Her last severe vertigo attack was in 2019, prior to COVID. MR brain in 2019 did not show any infarcts. Patient states she now has a frontal headache that pt believes is due to sinus congestion, denies recent sickness besides that. Denies any vision disturbance, focal weakness or numbness. She reports feeling irritation in her left ear but denies any ear pain, hearing loss or tinnitus. No falls or head strike. Reports recent R rotator cuff tendonitis, received cortisone injection to area but still notes some tenderness and weakness/deficits on RUE.    CTH and CTA in ED were unrevealing for acute infarct or significant posterior circulation stenosis. MR head no acute intracranial hemorrhage or acute infarct.. Minimal chronic microvascular ischemic changes. No intracranial hemodynamically significant stenosis. Stable mild (less than 50%) narrowing at the origin of left internal carotid artery    In the ED, she received meclizine 25mg x1, reglan x1, zofran x 1, scopolamine patch without much improvement in her symptoms. She reports diazepam 5mg gave her some mild relief.     Patient admitted to CDU and evaluated by Neurology and PT. Neurology believe symptoms, likely peripheral etiology of vertigo. Possibly localizing to left ear dysfunction. Low concern for posterior circulation dysfunction. PT recommend outpatient vestibular physical therapy.

## 2025-03-13 NOTE — DISCHARGE NOTE PROVIDER - HOSPITAL COURSE
HPI:      Hospital Course:      On day of discharge, patient is clinically stable with no new exam findings or acute symptoms compared to prior. The patient was seen by the attending physician on the date of discharge and deemed stable and acceptable for discharge. The patient's chronic medical conditions were treated accordingly per the patient's home medication regimen. The patient's medication reconciliation (with changes made to chronic medications), follow up appointments, discharge orders, instructions, and significant lab and diagnostic studies are as noted.    Changes to Medications:      Pending results or Follow-up Appointments:  Follow-up with Neurology  Follow-up with PCP    Advanced Directives:  Full Code    Discharge Diagnosis:  Vertigo     Dispo:  pending hospital course HPI:  70 year old female with hx/o hypertension, hyperlipidemia, CAD s/p stent (most recent 2022 on aspirin), vertigo,  left breast cancer s/p lumpectomy on anastrozole who presents with dizziness. Patient was in her usual state of health until Sunday night at 9Pm when she went to bed. Monday morning patient says she was turning her head to her right side and had sudden onset room spinning dizziness. She had nausea and was dry-heaving. Dizziness is worse with movement, more severe since last night and she has difficulty walking. Patient states symptoms are similar to her previous episodes of vertigo, but unrelieved with meclizine at home.  Her last severe vertigo attack was in 2019, prior to COVID. MR brain in 2019 did not show any infarcts. Patient states she now has a frontal headache that pt believes is due to sinus congestion, denies recent sickness besides that. Denies any vision disturbance, focal weakness or numbness. She reports feeling irritation in her left ear but denies any ear pain, hearing loss or tinnitus. No falls or head strike. Reports recent R rotator cuff tendonitis, received cortisone injection to area but still notes some tenderness and weakness/deficits on RUE.    CTH and CTA in ED were unrevealing for acute infarct or significant posterior circulation stenosis. MR head no acute intracranial hemorrhage or acute infarct.. Minimal chronic microvascular ischemic changes. No intracranial hemodynamically significant stenosis. Stable mild (less than 50%) narrowing at the origin of left internal carotid artery    In the ED, she received meclizine 25mg x1, reglan x1, zofran x 1, scopolamine patch without much improvement in her symptoms. She reports diazepam 5mg gave her some mild relief.     Patient admitted to CDU and evaluated by Neurology and PT. Neurology believe symptoms, likely peripheral etiology of vertigo. Possibly localizing to left ear dysfunction. Low concern for posterior circulation dysfunction. PT recommend outpatient vestibular physical therapy.     Hospital Course:  During hospital admission, patient contiuned to be followed by PT. Patient's symptoms were managed with meclizine, diazepam and zofran. Noted to have bradycardia during admission otherwise HDS.  Held metoprolol. Orthostatic vitals were normal. Obtain EKG that showed no acute abnormalities contributing to heart rate. Obtained thyroid studies.    On day of discharge, patient is clinically stable with no new exam findings or acute symptoms compared to prior. The patient was seen by the attending physician on the date of discharge and deemed stable and acceptable for discharge. The patient's chronic medical conditions were treated accordingly per the patient's home medication regimen. The patient's medication reconciliation (with changes made to chronic medications), follow up appointments, discharge orders, instructions, and significant lab and diagnostic studies are as noted.    Changes to Medications:  *INCOMPLETE*    Pending results or Follow-up Appointments:  Follow-up with Neurology  Follow-up with PCP  Follow-up with ENT  Attend outpatient vestibular physical therapy    Advanced Directives:  Full Code    Discharge Diagnosis:  Vertigo     Dispo:  pending hospital course HPI:  70 year old female with hx/o hypertension, hyperlipidemia, CAD s/p stent (most recent 2022 on aspirin), vertigo,  left breast cancer s/p lumpectomy on anastrozole who presents with dizziness. Patient was in her usual state of health until Sunday night at 9Pm when she went to bed. Monday morning patient says she was turning her head to her right side and had sudden onset room spinning dizziness. She had nausea and was dry-heaving. Dizziness is worse with movement, more severe since last night and she has difficulty walking. Patient states symptoms are similar to her previous episodes of vertigo, but unrelieved with meclizine at home.  Her last severe vertigo attack was in 2019, prior to COVID. MR brain in 2019 did not show any infarcts. Patient states she now has a frontal headache that pt believes is due to sinus congestion, denies recent sickness besides that. Denies any vision disturbance, focal weakness or numbness. She reports feeling irritation in her left ear but denies any ear pain, hearing loss or tinnitus. No falls or head strike. Reports recent R rotator cuff tendonitis, received cortisone injection to area but still notes some tenderness and weakness/deficits on RUE.    CTH and CTA in ED were unrevealing for acute infarct or significant posterior circulation stenosis. MR head no acute intracranial hemorrhage or acute infarct.. Minimal chronic microvascular ischemic changes. No intracranial hemodynamically significant stenosis. Stable mild (less than 50%) narrowing at the origin of left internal carotid artery    In the ED, she received meclizine 25mg x1, reglan x1, zofran x 1, scopolamine patch without much improvement in her symptoms. She reports diazepam 5mg gave her some mild relief.     Patient admitted to CDU and evaluated by Neurology and PT. Neurology believe symptoms, likely peripheral etiology of vertigo. Possibly localizing to left ear dysfunction. Low concern for posterior circulation dysfunction. PT recommend outpatient vestibular physical therapy.     Hospital Course:  During hospital admission, patient continued to be followed by PT. Patient's symptoms were managed with meclizine, diazepam, and zofran. Noted to have bradycardia during admission otherwise HDS.  Held metoprolol. Orthostatic vitals were normal. Obtain EKG that showed no acute abnormalities contributing to heart rate. Obtained thyroid studies. Patient's dizziness gradually improved.     On day of discharge, patient is clinically stable with no new exam findings or acute symptoms compared to prior. The patient was seen by the attending physician on the date of discharge and deemed stable and acceptable for discharge. The patient's chronic medical conditions were treated accordingly per the patient's home medication regimen. The patient's medication reconciliation (with changes made to chronic medications), follow up appointments, discharge orders, instructions, and significant lab and diagnostic studies are as noted.    Changes to Medications:  *INCOMPLETE*    Pending results or Follow-up Appointments:  Follow-up with Neurology  Follow-up with PCP  Follow-up with ENT  Attend outpatient vestibular physical therapy    Advanced Directives:  Full Code    Discharge Diagnosis:  Vertigo     Dispo:  pending hospital course HPI:  70 year old female with hx/o hypertension, hyperlipidemia, CAD s/p stent (most recent 2022 on aspirin), vertigo,  left breast cancer s/p lumpectomy on anastrozole who presents with dizziness. Patient was in her usual state of health until Sunday night at 9Pm when she went to bed. Monday morning patient says she was turning her head to her right side and had sudden onset room spinning dizziness. She had nausea and was dry-heaving. Dizziness is worse with movement, more severe since last night and she has difficulty walking. Patient states symptoms are similar to her previous episodes of vertigo, but unrelieved with meclizine at home.  Her last severe vertigo attack was in 2019, prior to COVID. MR brain in 2019 did not show any infarcts. Patient states she now has a frontal headache that pt believes is due to sinus congestion, denies recent sickness besides that. Denies any vision disturbance, focal weakness or numbness. She reports feeling irritation in her left ear but denies any ear pain, hearing loss or tinnitus. No falls or head strike. Reports recent R rotator cuff tendonitis, received cortisone injection to area but still notes some tenderness and weakness/deficits on RUE.      Hospital Course:    CTA Head and Neck, MRA Head and Neck, and MR Brain we all not notable, showing only mild (less than 50%) narrowing at the origin of left internal carotid artery. Pt seen by neurology and PT recommended for vestibular therapy. PT had their vertigo managed with meclizine, valium, zofran, reglan, and scopolamine. Pt had orthostatic VS done which were negative. PT found to bradycardic, TSH and EKG not notable recommend follow up with cardiologist outpt. Pt to be discharged with ENT follow up with recommendation of vestibular therapy.     On day of discharge, patient is clinically stable with no new exam findings or acute symptoms compared to prior. The patient was seen by the attending physician on the date of discharge and deemed stable and acceptable for discharge. The patient's chronic medical conditions were treated accordingly per the patient's home medication regimen. The patient's medication reconciliation (with changes made to chronic medications), follow up appointments, discharge orders, instructions, and significant lab and diagnostic studies are as noted.    Changes to Medications:  Meclizine 12.5 Q6H PRN 3 days  Zofran 4mg Q8H PRN 3 days  Hold Metoprolol Succinate and Aldactone     Pending results or Follow-up Appointments:  Follow-up with Neurology  Follow-up with PCP  Follow-up with ENT  Follow- up with Cardiology   Attend outpatient vestibular physical therapy    Advanced Directives:  Full Code    Discharge Diagnosis:  Vertigo      HPI:  70 year old female with hx/o hypertension, hyperlipidemia, CAD s/p stent (most recent 2022 on aspirin), vertigo,  left breast cancer s/p lumpectomy on anastrozole who presents with dizziness. Patient was in her usual state of health until Sunday night at 9Pm when she went to bed. Monday morning patient says she was turning her head to her right side and had sudden onset room spinning dizziness. She had nausea and was dry-heaving. Dizziness is worse with movement, more severe since last night and she has difficulty walking. Patient states symptoms are similar to her previous episodes of vertigo, but unrelieved with meclizine at home.  Her last severe vertigo attack was in 2019, prior to COVID. MR brain in 2019 did not show any infarcts. Patient states she now has a frontal headache that pt believes is due to sinus congestion, denies recent sickness besides that. Denies any vision disturbance, focal weakness or numbness. She reports feeling irritation in her left ear but denies any ear pain, hearing loss or tinnitus. No falls or head strike. Reports recent R rotator cuff tendonitis, received cortisone injection to area but still notes some tenderness and weakness/deficits on RUE.      Hospital Course:    CTA Head and Neck, MRA Head and Neck, and MR Brain we all not notable, showing only mild (less than 50%) narrowing at the origin of left internal carotid artery. Pt seen by neurology and PT recommended for vestibular therapy. PT had their vertigo managed with meclizine, valium, zofran, reglan, and scopolamine. Pt had orthostatic VS done which were negative. PT found to bradycardic, TSH and free t4 wnl, and EKG with sinus bradycardia, metoprolol held, recommend follow up with cardiologist outpt. Pt to be discharged with ENT follow up with recommendation of vestibular therapy.     On day of discharge, patient is clinically stable with no new exam findings or acute symptoms compared to prior. The patient was seen by the attending physician on the date of discharge and deemed stable and acceptable for discharge. The patient's chronic medical conditions were treated accordingly per the patient's home medication regimen. The patient's medication reconciliation (with changes made to chronic medications), follow up appointments, discharge orders, instructions, and significant lab and diagnostic studies are as noted.    Changes to Medications:  Meclizine 12.5 Q6H PRN 3 days  Zofran 4mg Q8H PRN 3 days  Hold Metoprolol Succinate and Aldactone     Pending results or Follow-up Appointments:  Follow-up with Neurology  Follow-up with PCP  Follow-up with ENT  Follow- up with Cardiology   Attend outpatient vestibular physical therapy    Advanced Directives:  Full Code    Discharge Diagnosis:  Vertigo     stable for dc

## 2025-03-13 NOTE — DISCHARGE NOTE PROVIDER - NSDCFUADDAPPT_GEN_ALL_CORE_FT
APPTS ARE READY TO BE MADE: [ ] YES    Best Family or Patient Contact (if needed):    Additional Information about above appointments (if needed):    1:   2:   3:     Other comments or requests:    APPTS ARE READY TO BE MADE: [ ] YES    Best Family or Patient Contact (if needed):    Additional Information about above appointments (if needed):    1: Outpatient vestibular rehab  2: ENT follow-up post discharge  3: PCP  4. Neurology    Other comments or requests:    APPTS ARE READY TO BE MADE: [X ] YES    Best Family or Patient Contact (if needed):    Additional Information about above appointments (if needed):    1: Outpatient vestibular rehab  2: ENT follow-up post discharge  3: PCP  4. Neurology    Other comments or requests:    APPTS ARE READY TO BE MADE: [X ] YES    Best Family or Patient Contact (if needed):    Additional Information about above appointments (if needed):    1: Outpatient vestibular rehab  2: ENT follow-up post discharge  3: PCP  4. Neurology    Other comments or requests:   Patient advised they did not want to proceed with scheduling appointments with the providers on their referrals. They will coordinate care on their own.

## 2025-03-13 NOTE — ED CDU PROVIDER DISPOSITION NOTE - ATTENDING APP SHARED VISIT CONTRIBUTION OF CARE
I have made the decision to admit this patient to the CDU as documented in my Provider note I have reviewed the note  written by the CDU Physician Assistant. I have supervised and participated as necessary in the performance of procedures indicated for patient management and was available at all phases of the patient´s visit when needed.    Patient cleared by neuro for d/c; Imaging non actionable but still with intractable vomiting   Will need admission for continued intensive therapy for vertigo

## 2025-03-13 NOTE — PATIENT PROFILE ADULT - FALL HARM RISK - HARM RISK INTERVENTIONS
Assistance with ambulation/Assistance OOB with selected safe patient handling equipment/Communicate Risk of Fall with Harm to all staff/Monitor gait and stability/Reinforce activity limits and safety measures with patient and family/Sit up slowly, dangle for a short time, stand at bedside before walking/Tailored Fall Risk Interventions/Visual Cue: Yellow wristband and red socks/Bed in lowest position, wheels locked, appropriate side rails in place/Call bell, personal items and telephone in reach/Instruct patient to call for assistance before getting out of bed or chair/Non-slip footwear when patient is out of bed/[place] to call system/Physically safe environment - no spills, clutter or unnecessary equipment/Purposeful Proactive Rounding/Room/bathroom lighting operational, light cord in reach

## 2025-03-14 LAB
ALBUMIN SERPL ELPH-MCNC: 3.5 G/DL — SIGNIFICANT CHANGE UP (ref 3.3–5)
ALP SERPL-CCNC: 80 U/L — SIGNIFICANT CHANGE UP (ref 40–120)
ALT FLD-CCNC: 8 U/L — SIGNIFICANT CHANGE UP (ref 4–33)
ANION GAP SERPL CALC-SCNC: 15 MMOL/L — HIGH (ref 7–14)
AST SERPL-CCNC: 12 U/L — SIGNIFICANT CHANGE UP (ref 4–32)
BASOPHILS NFR BLD AUTO: 0.2 % — SIGNIFICANT CHANGE UP (ref 0–2)
BILIRUB SERPL-MCNC: 0.6 MG/DL — SIGNIFICANT CHANGE UP (ref 0.2–1.2)
BUN SERPL-MCNC: 19 MG/DL — SIGNIFICANT CHANGE UP (ref 7–23)
CALCIUM SERPL-MCNC: 9.1 MG/DL — SIGNIFICANT CHANGE UP (ref 8.4–10.5)
CHLORIDE SERPL-SCNC: 106 MMOL/L — SIGNIFICANT CHANGE UP (ref 98–107)
CO2 SERPL-SCNC: 21 MMOL/L — LOW (ref 22–31)
CREAT SERPL-MCNC: 0.7 MG/DL — SIGNIFICANT CHANGE UP (ref 0.5–1.3)
EGFR: 93 ML/MIN/1.73M2 — SIGNIFICANT CHANGE UP
EGFR: 93 ML/MIN/1.73M2 — SIGNIFICANT CHANGE UP
EOSINOPHIL # BLD AUTO: 0.12 K/UL — SIGNIFICANT CHANGE UP (ref 0–0.5)
EOSINOPHIL NFR BLD AUTO: 1.9 % — SIGNIFICANT CHANGE UP (ref 0–6)
GLUCOSE SERPL-MCNC: 103 MG/DL — HIGH (ref 70–99)
HCT VFR BLD CALC: 35.5 % — SIGNIFICANT CHANGE UP (ref 34.5–45)
HGB BLD-MCNC: 11.8 G/DL — SIGNIFICANT CHANGE UP (ref 11.5–15.5)
IANC: 4.52 K/UL — SIGNIFICANT CHANGE UP (ref 1.8–7.4)
IMM GRANULOCYTES NFR BLD AUTO: 0.3 % — SIGNIFICANT CHANGE UP (ref 0–0.9)
LYMPHOCYTES # BLD AUTO: 1.35 K/UL — SIGNIFICANT CHANGE UP (ref 1–3.3)
LYMPHOCYTES # BLD AUTO: 21.2 % — SIGNIFICANT CHANGE UP (ref 13–44)
MCHC RBC-ENTMCNC: 30.4 PG — SIGNIFICANT CHANGE UP (ref 27–34)
MCHC RBC-ENTMCNC: 33.2 G/DL — SIGNIFICANT CHANGE UP (ref 32–36)
MCV RBC AUTO: 91.5 FL — SIGNIFICANT CHANGE UP (ref 80–100)
MONOCYTES # BLD AUTO: 0.34 K/UL — SIGNIFICANT CHANGE UP (ref 0–0.9)
MONOCYTES NFR BLD AUTO: 5.3 % — SIGNIFICANT CHANGE UP (ref 2–14)
MRSA PCR RESULT.: SIGNIFICANT CHANGE UP
NEUTROPHILS # BLD AUTO: 4.52 K/UL — SIGNIFICANT CHANGE UP (ref 1.8–7.4)
NEUTROPHILS NFR BLD AUTO: 71.1 % — SIGNIFICANT CHANGE UP (ref 43–77)
NRBC # BLD AUTO: 0 K/UL — SIGNIFICANT CHANGE UP (ref 0–0)
NRBC # FLD: 0 K/UL — SIGNIFICANT CHANGE UP (ref 0–0)
NRBC BLD AUTO-RTO: 0 /100 WBCS — SIGNIFICANT CHANGE UP (ref 0–0)
PHOSPHATE SERPL-MCNC: 4.3 MG/DL — SIGNIFICANT CHANGE UP (ref 2.5–4.5)
PLATELET # BLD AUTO: 216 K/UL — SIGNIFICANT CHANGE UP (ref 150–400)
POTASSIUM SERPL-SCNC: 4.3 MMOL/L — SIGNIFICANT CHANGE UP (ref 3.5–5.3)
PROT SERPL-MCNC: 6.5 G/DL — SIGNIFICANT CHANGE UP (ref 6–8.3)
RBC # BLD: 3.88 M/UL — SIGNIFICANT CHANGE UP (ref 3.8–5.2)
RBC # FLD: 13 % — SIGNIFICANT CHANGE UP (ref 10.3–14.5)
S AUREUS DNA NOSE QL NAA+PROBE: SIGNIFICANT CHANGE UP
SODIUM SERPL-SCNC: 142 MMOL/L — SIGNIFICANT CHANGE UP (ref 135–145)
WBC # BLD: 6.36 K/UL — SIGNIFICANT CHANGE UP (ref 3.8–10.5)

## 2025-03-14 PROCEDURE — 99232 SBSQ HOSP IP/OBS MODERATE 35: CPT | Mod: GC

## 2025-03-14 PROCEDURE — 93010 ELECTROCARDIOGRAM REPORT: CPT

## 2025-03-14 RX ORDER — MAGNESIUM SULFATE 500 MG/ML
2 SYRINGE (ML) INJECTION ONCE
Refills: 0 | Status: COMPLETED | OUTPATIENT
Start: 2025-03-14 | End: 2025-03-14

## 2025-03-14 RX ORDER — ONDANSETRON HCL/PF 4 MG/2 ML
4 VIAL (ML) INJECTION EVERY 6 HOURS
Refills: 0 | Status: DISCONTINUED | OUTPATIENT
Start: 2025-03-14 | End: 2025-03-16

## 2025-03-14 RX ADMIN — Medication 4 MILLIGRAM(S): at 17:13

## 2025-03-14 RX ADMIN — Medication 25 GRAM(S): at 09:05

## 2025-03-14 RX ADMIN — Medication 81 MILLIGRAM(S): at 11:38

## 2025-03-14 RX ADMIN — Medication 1 APPLICATION(S): at 11:42

## 2025-03-14 RX ADMIN — HEPARIN SODIUM 5000 UNIT(S): 1000 INJECTION INTRAVENOUS; SUBCUTANEOUS at 05:29

## 2025-03-14 RX ADMIN — SCOPOLAMINE 1 PATCH: 1 PATCH, EXTENDED RELEASE TRANSDERMAL at 19:53

## 2025-03-14 RX ADMIN — Medication 75 MILLILITER(S): at 01:10

## 2025-03-14 RX ADMIN — HEPARIN SODIUM 5000 UNIT(S): 1000 INJECTION INTRAVENOUS; SUBCUTANEOUS at 17:13

## 2025-03-14 RX ADMIN — Medication 4 MILLIGRAM(S): at 11:38

## 2025-03-14 RX ADMIN — ANASTROZOLE 1 MILLIGRAM(S): 1 TABLET ORAL at 11:38

## 2025-03-14 RX ADMIN — SCOPOLAMINE 1 PATCH: 1 PATCH, EXTENDED RELEASE TRANSDERMAL at 08:47

## 2025-03-14 RX ADMIN — ATORVASTATIN CALCIUM 40 MILLIGRAM(S): 80 TABLET, FILM COATED ORAL at 22:18

## 2025-03-14 RX ADMIN — Medication 25 MILLIGRAM(S): at 17:14

## 2025-03-14 RX ADMIN — Medication 20 MILLIGRAM(S): at 05:29

## 2025-03-14 NOTE — PROGRESS NOTE ADULT - PROBLEM SELECTOR PLAN 1
PLAN  Neurology believes history and symptoms most consistent with peripheral etiology of vertigo. Possibly localizing to left ear dysfunction. Low concern for posterior circulation dysfunction  PT recommend outpatient vestibular physical therapy  - Patient may benefit from inpatient [facility]  - Symptomatic management with Meclizine 25mg TID PRN (up to 1 week) or consider diazepam and zofran PRN   - Outpatient vestibular rehab and ENT follow-up post discharge   - Neuro-checks and VS q4h  - BP goal normotension 120/80   - EKG, telemetry, orthostatic vitals   - Fall precautions PLAN  Neurology believes history and symptoms most consistent with peripheral etiology of vertigo. Possibly localizing to left ear dysfunction. Low concern for posterior circulation dysfunction  PT recommend outpatient vestibular physical therapy  - Patient may benefit from inpatient [facility]  - Symptomatic management with Meclizine 25mg TID PRN (up to 1 week)  - Diazepam PRN and zofran ATC   - Outpatient vestibular rehab and ENT follow-up post discharge   - Neuro-checks and VS q4h  - BP goal normotension 120/80   - EKG, telemetry, orthostatic vitals   - Fall precautions PLAN  Neurology believes history and symptoms most consistent with peripheral etiology of vertigo. Possibly localizing to left ear dysfunction. Low concern for posterior circulation dysfunction  PT recommend outpatient vestibular physical therapy  - Patient may benefit from [facility]  - Symptomatic management with Meclizine 25mg TID PRN (up to 1 week)  - Diazepam PRN and zofran ATC for now   - Outpatient vestibular rehab and ENT follow-up post discharge   - Neuro-checks and VS q4h  - BP goal normotension 120/80   - EKG, telemetry, orthostatic vitals   - Fall precautions

## 2025-03-14 NOTE — PROGRESS NOTE ADULT - SUBJECTIVE AND OBJECTIVE BOX
--- | PGY-1  Internal Medicine    OVERNIGHT EVENTS: no overnight events      SUBJECTIVE: 70y y/o patient was examined at bedside this morning. Appeared comfortable. Overnight vitals and monitoring results were reviewed, two episodes of bradycardia to 54 and 50, amlodipine held. Reporting no complaints. Denied chest pain, SOB, abdominal pain, vomiting, diarrhea, constipation.       MEDICATIONS  (STANDING):  amLODIPine   Tablet 10 milliGRAM(s) Oral daily  anastrozole 1 milliGRAM(s) Oral daily  aspirin  chewable 81 milliGRAM(s) Oral daily  atorvastatin 40 milliGRAM(s) Oral at bedtime  chlorhexidine 2% Cloths 1 Application(s) Topical daily  enalapril 20 milliGRAM(s) Oral daily  heparin   Injectable 5000 Unit(s) SubCutaneous every 12 hours  scopolamine 1 mG/72 Hr(s) Patch 1 Patch Transdermal every 72 hours  sodium chloride 0.9%. 1000 milliLiter(s) (75 mL/Hr) IV Continuous <Continuous>    MEDICATIONS  (PRN):  diazepam    Tablet 5 milliGRAM(s) Oral every 8 hours PRN dizziness  meclizine 25 milliGRAM(s) Oral every 8 hours PRN Dizziness  ondansetron Injectable 4 milliGRAM(s) IV Push every 6 hours PRN Nausea and/or Vomiting        T(F): 97.3 (03-14-25 @ 05:23), Max: 98.5 (03-14-25 @ 01:06)  HR: 50 (03-14-25 @ 05:23) (50 - 60)  BP: 151/63 (03-14-25 @ 05:23) (120/52 - 151/63)  BP(mean): --  RR: 18 (03-14-25 @ 05:23) (15 - 18)  SpO2: 98% (03-14-25 @ 05:23) (86% - 100%)    PHYSICAL EXAM:     GENERAL: NAD, lying in bed comfortably  HEAD:  Atraumatic, Normocephalic, CN II-XII intact  EYES: EOMI, PERRLA, conjunctiva and sclera clear, no nystagmus noted  CHEST/LUNG: Clear to auscultation bilaterally. Unlabored respirations  HEART: Regular rate and rhythm  EXTREMITIES:  2+ Peripheral Pulses. No clubbing, cyanosis, or edema  Neurological:  A&Ox3, no focal deficits   PSYCH: Normal mood, affect     TELEMETRY:    LABS:                        11.8   6.36  )-----------( 216      ( 14 Mar 2025 06:06 )             35.5           RADIOLOGY & ADDITIONAL STUDIES:    MRA of the head:  The proximal anterior, middle and posterior cerebral arteries are patent.   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 Fetal origin   right posterior cerebral artery.    Focal stenosis at the A1 segment of right anterior cerebral artery   appearance likely artifactual.    The intracranial vertebral and basilar arteries are patent.    There is no intracranial aneurysm.    MRA of the neck:  The visualized common carotid and internal carotid arteries are patent.    Mild stenosis at origin left internal carotid artery unchanged.    The visualized extracranial vertebral arteries are patent. Dominant right   vertebral artery noted      IMPRESSION:  No acute intracranial hemorrhage or acute infarct.    Minimal chronic microvascular ischemic changes    No intracranial hemodynamically significant stenosis    Stable mild (less than 50%) narrowing at the origin of left internal   carotid artery    CT HEAD:  No acute intracranial hemorrhage, mass effect, or CT evidence of an acute   vascular territorial infarct. No abnormal intracranial enhancement on   postcontrast images. Mild chronic ischemic changes in the cerebral white   matter.    CTA NECK:  Patent cervical carotid and vertebral arteries. Up to 15% stenosis of the   proximal cervical left internal carotid artery based on NASCET criteria.    CTA HEAD:  No large vessel occlusion, significant stenosis or vascular abnormality   identified.         --- | PGY-1  Internal Medicine    OVERNIGHT EVENTS: Patient had two episodes of bradycardia to 54 and 50, amlodipine held.      SUBJECTIVE: 70y y/o patient was examined at bedside this morning. Appeared comfortable. Reporting that she feels better than yesterday, nausea was gone by last night and dizziness is much improved, but still present. Patient will attempt to walk today with assistance. Denied headache, vision changes, weakness.       MEDICATIONS  (STANDING):  amLODIPine   Tablet 10 milliGRAM(s) Oral daily  anastrozole 1 milliGRAM(s) Oral daily  aspirin  chewable 81 milliGRAM(s) Oral daily  atorvastatin 40 milliGRAM(s) Oral at bedtime  chlorhexidine 2% Cloths 1 Application(s) Topical daily  enalapril 20 milliGRAM(s) Oral daily  heparin   Injectable 5000 Unit(s) SubCutaneous every 12 hours  scopolamine 1 mG/72 Hr(s) Patch 1 Patch Transdermal every 72 hours  sodium chloride 0.9%. 1000 milliLiter(s) (75 mL/Hr) IV Continuous <Continuous>    MEDICATIONS  (PRN):  diazepam    Tablet 5 milliGRAM(s) Oral every 8 hours PRN dizziness  meclizine 25 milliGRAM(s) Oral every 8 hours PRN Dizziness  ondansetron Injectable 4 milliGRAM(s) IV Push every 6 hours PRN Nausea and/or Vomiting        T(F): 97.3 (03-14-25 @ 05:23), Max: 98.5 (03-14-25 @ 01:06)  HR: 50 (03-14-25 @ 05:23) (50 - 60)  BP: 151/63 (03-14-25 @ 05:23) (120/52 - 151/63)  BP(mean): --  RR: 18 (03-14-25 @ 05:23) (15 - 18)  SpO2: 98% (03-14-25 @ 05:23) (86% - 100%)    PHYSICAL EXAM:     GENERAL: NAD, lying in bed comfortably  HEAD:  Atraumatic, Normocephalic, CN II-XII intact  EYES: EOMI, PERRLA, conjunctiva and sclera clear, no nystagmus noted  CHEST/LUNG: Clear to auscultation bilaterally. Unlabored respirations  HEART: Regular rate and rhythm  EXTREMITIES:  2+ Peripheral Pulses. No clubbing, cyanosis, or edema  Neurological:  A&Ox3, no focal deficits, sensation intact   PSYCH: Normal mood, affect     TELEMETRY:    LABS:                        11.8   6.36  )-----------( 216      ( 14 Mar 2025 06:06 )             35.5           RADIOLOGY & ADDITIONAL STUDIES:    MRA of the head:  The proximal anterior, middle and posterior cerebral arteries are patent.   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 Fetal origin   right posterior cerebral artery.    Focal stenosis at the A1 segment of right anterior cerebral artery   appearance likely artifactual.    The intracranial vertebral and basilar arteries are patent.    There is no intracranial aneurysm.    MRA of the neck:  The visualized common carotid and internal carotid arteries are patent.    Mild stenosis at origin left internal carotid artery unchanged.    The visualized extracranial vertebral arteries are patent. Dominant right   vertebral artery noted      IMPRESSION:  No acute intracranial hemorrhage or acute infarct.    Minimal chronic microvascular ischemic changes    No intracranial hemodynamically significant stenosis    Stable mild (less than 50%) narrowing at the origin of left internal   carotid artery    CT HEAD:  No acute intracranial hemorrhage, mass effect, or CT evidence of an acute   vascular territorial infarct. No abnormal intracranial enhancement on   postcontrast images. Mild chronic ischemic changes in the cerebral white   matter.    CTA NECK:  Patent cervical carotid and vertebral arteries. Up to 15% stenosis of the   proximal cervical left internal carotid artery based on NASCET criteria.    CTA HEAD:  No large vessel occlusion, significant stenosis or vascular abnormality   identified.         --- | PGY-1  Internal Medicine    OVERNIGHT EVENTS: Patient had two episodes of bradycardia to 54 and 50, amlodipine held.      SUBJECTIVE: 71y/o F examined at bedside this morning. Appeared comfortable. Reporting that she feels better than yesterday, nausea was gone by last night and dizziness is much improved, but still present. Patient will attempt to walk today with assistance. Denied headache, vision changes, weakness.       MEDICATIONS  (STANDING):  amLODIPine   Tablet 10 milliGRAM(s) Oral daily  anastrozole 1 milliGRAM(s) Oral daily  aspirin  chewable 81 milliGRAM(s) Oral daily  atorvastatin 40 milliGRAM(s) Oral at bedtime  chlorhexidine 2% Cloths 1 Application(s) Topical daily  enalapril 20 milliGRAM(s) Oral daily  heparin   Injectable 5000 Unit(s) SubCutaneous every 12 hours  scopolamine 1 mG/72 Hr(s) Patch 1 Patch Transdermal every 72 hours  sodium chloride 0.9%. 1000 milliLiter(s) (75 mL/Hr) IV Continuous <Continuous>    MEDICATIONS  (PRN):  diazepam    Tablet 5 milliGRAM(s) Oral every 8 hours PRN dizziness  meclizine 25 milliGRAM(s) Oral every 8 hours PRN Dizziness  ondansetron Injectable 4 milliGRAM(s) IV Push every 6 hours PRN Nausea and/or Vomiting        T(F): 97.3 (03-14-25 @ 05:23), Max: 98.5 (03-14-25 @ 01:06)  HR: 50 (03-14-25 @ 05:23) (50 - 60)  BP: 151/63 (03-14-25 @ 05:23) (120/52 - 151/63)  BP(mean): --  RR: 18 (03-14-25 @ 05:23) (15 - 18)  SpO2: 98% (03-14-25 @ 05:23) (86% - 100%)    PHYSICAL EXAM:     GENERAL: NAD, lying in bed comfortably  HEAD:  Atraumatic, Normocephalic, CN II-XII intact  EYES: EOMI, PERRLA, conjunctiva and sclera clear, no nystagmus noted  CHEST/LUNG: Clear to auscultation bilaterally. Unlabored respirations  HEART: Regular rate and rhythm  EXTREMITIES:  2+ Peripheral Pulses. No clubbing, cyanosis, or edema  Neurological:  A&Ox3, no focal deficits, sensation intact   PSYCH: Normal mood, affect     TELEMETRY:    LABS:                        11.8   6.36  )-----------( 216      ( 14 Mar 2025 06:06 )             35.5           RADIOLOGY & ADDITIONAL STUDIES:    MRA of the head:  The proximal anterior, middle and posterior cerebral arteries are patent.   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 Fetal origin   right posterior cerebral artery.    Focal stenosis at the A1 segment of right anterior cerebral artery   appearance likely artifactual.    The intracranial vertebral and basilar arteries are patent.    There is no intracranial aneurysm.    MRA of the neck:  The visualized common carotid and internal carotid arteries are patent.    Mild stenosis at origin left internal carotid artery unchanged.    The visualized extracranial vertebral arteries are patent. Dominant right   vertebral artery noted      IMPRESSION:  No acute intracranial hemorrhage or acute infarct.    Minimal chronic microvascular ischemic changes    No intracranial hemodynamically significant stenosis    Stable mild (less than 50%) narrowing at the origin of left internal   carotid artery    CT HEAD:  No acute intracranial hemorrhage, mass effect, or CT evidence of an acute   vascular territorial infarct. No abnormal intracranial enhancement on   postcontrast images. Mild chronic ischemic changes in the cerebral white   matter.    CTA NECK:  Patent cervical carotid and vertebral arteries. Up to 15% stenosis of the   proximal cervical left internal carotid artery based on NASCET criteria.    CTA HEAD:  No large vessel occlusion, significant stenosis or vascular abnormality   identified.

## 2025-03-14 NOTE — PROGRESS NOTE ADULT - PROBLEM SELECTOR PLAN 5
- subcu heparin  - encourage OOB as tolerated - subcu heparin  - encourage OOB as tolerated  - fall precautions

## 2025-03-15 LAB
ADD ON TEST-SPECIMEN IN LAB: SIGNIFICANT CHANGE UP
ALBUMIN SERPL ELPH-MCNC: 3.6 G/DL — SIGNIFICANT CHANGE UP (ref 3.3–5)
ALP SERPL-CCNC: 75 U/L — SIGNIFICANT CHANGE UP (ref 40–120)
ALT FLD-CCNC: 10 U/L — SIGNIFICANT CHANGE UP (ref 4–33)
ANION GAP SERPL CALC-SCNC: 14 MMOL/L — SIGNIFICANT CHANGE UP (ref 7–14)
AST SERPL-CCNC: 15 U/L — SIGNIFICANT CHANGE UP (ref 4–32)
BASOPHILS # BLD AUTO: 0.02 K/UL — SIGNIFICANT CHANGE UP (ref 0–0.2)
BASOPHILS NFR BLD AUTO: 0.3 % — SIGNIFICANT CHANGE UP (ref 0–2)
BILIRUB SERPL-MCNC: 0.4 MG/DL — SIGNIFICANT CHANGE UP (ref 0.2–1.2)
BUN SERPL-MCNC: 19 MG/DL — SIGNIFICANT CHANGE UP (ref 7–23)
CALCIUM SERPL-MCNC: 8.9 MG/DL — SIGNIFICANT CHANGE UP (ref 8.4–10.5)
CHLORIDE SERPL-SCNC: 107 MMOL/L — SIGNIFICANT CHANGE UP (ref 98–107)
CO2 SERPL-SCNC: 19 MMOL/L — LOW (ref 22–31)
CREAT SERPL-MCNC: 0.72 MG/DL — SIGNIFICANT CHANGE UP (ref 0.5–1.3)
EGFR: 90 ML/MIN/1.73M2 — SIGNIFICANT CHANGE UP
EGFR: 90 ML/MIN/1.73M2 — SIGNIFICANT CHANGE UP
EOSINOPHIL # BLD AUTO: 0.16 K/UL — SIGNIFICANT CHANGE UP (ref 0–0.5)
EOSINOPHIL NFR BLD AUTO: 2.3 % — SIGNIFICANT CHANGE UP (ref 0–6)
HCT VFR BLD CALC: 35.5 % — SIGNIFICANT CHANGE UP (ref 34.5–45)
HGB BLD-MCNC: 11.7 G/DL — SIGNIFICANT CHANGE UP (ref 11.5–15.5)
IANC: 4.96 K/UL — SIGNIFICANT CHANGE UP (ref 1.8–7.4)
IMM GRANULOCYTES NFR BLD AUTO: 0.4 % — SIGNIFICANT CHANGE UP (ref 0–0.9)
LYMPHOCYTES # BLD AUTO: 1.51 K/UL — SIGNIFICANT CHANGE UP (ref 1–3.3)
LYMPHOCYTES # BLD AUTO: 21.5 % — SIGNIFICANT CHANGE UP (ref 13–44)
MAGNESIUM SERPL-MCNC: 2.1 MG/DL — SIGNIFICANT CHANGE UP (ref 1.6–2.6)
MCHC RBC-ENTMCNC: 30.7 PG — SIGNIFICANT CHANGE UP (ref 27–34)
MCHC RBC-ENTMCNC: 33 G/DL — SIGNIFICANT CHANGE UP (ref 32–36)
MCV RBC AUTO: 93.2 FL — SIGNIFICANT CHANGE UP (ref 80–100)
MONOCYTES NFR BLD AUTO: 5 % — SIGNIFICANT CHANGE UP (ref 2–14)
NEUTROPHILS # BLD AUTO: 4.96 K/UL — SIGNIFICANT CHANGE UP (ref 1.8–7.4)
NEUTROPHILS NFR BLD AUTO: 70.5 % — SIGNIFICANT CHANGE UP (ref 43–77)
NRBC # BLD AUTO: 0 K/UL — SIGNIFICANT CHANGE UP (ref 0–0)
NRBC # FLD: 0 K/UL — SIGNIFICANT CHANGE UP (ref 0–0)
NRBC BLD AUTO-RTO: 0 /100 WBCS — SIGNIFICANT CHANGE UP (ref 0–0)
PHOSPHATE SERPL-MCNC: 4.1 MG/DL — SIGNIFICANT CHANGE UP (ref 2.5–4.5)
PLATELET # BLD AUTO: 223 K/UL — SIGNIFICANT CHANGE UP (ref 150–400)
POTASSIUM SERPL-MCNC: 4.4 MMOL/L — SIGNIFICANT CHANGE UP (ref 3.5–5.3)
POTASSIUM SERPL-SCNC: 4.4 MMOL/L — SIGNIFICANT CHANGE UP (ref 3.5–5.3)
PROT SERPL-MCNC: 6.4 G/DL — SIGNIFICANT CHANGE UP (ref 6–8.3)
RBC # BLD: 3.81 M/UL — SIGNIFICANT CHANGE UP (ref 3.8–5.2)
RBC # FLD: 13.2 % — SIGNIFICANT CHANGE UP (ref 10.3–14.5)
SODIUM SERPL-SCNC: 140 MMOL/L — SIGNIFICANT CHANGE UP (ref 135–145)
T4 AB SER-ACNC: 9 UG/DL — SIGNIFICANT CHANGE UP (ref 5.1–13)
TSH SERPL-MCNC: 1.29 UIU/ML — SIGNIFICANT CHANGE UP (ref 0.27–4.2)
WBC # BLD: 7.03 K/UL — SIGNIFICANT CHANGE UP (ref 3.8–10.5)
WBC # FLD AUTO: 7.03 K/UL — SIGNIFICANT CHANGE UP (ref 3.8–10.5)

## 2025-03-15 PROCEDURE — 93010 ELECTROCARDIOGRAM REPORT: CPT

## 2025-03-15 PROCEDURE — 99232 SBSQ HOSP IP/OBS MODERATE 35: CPT | Mod: GC

## 2025-03-15 RX ORDER — AMLODIPINE BESYLATE 10 MG/1
1 TABLET ORAL
Refills: 0 | DISCHARGE

## 2025-03-15 RX ADMIN — SCOPOLAMINE 1 PATCH: 1 PATCH, EXTENDED RELEASE TRANSDERMAL at 19:48

## 2025-03-15 RX ADMIN — Medication 4 MILLIGRAM(S): at 05:34

## 2025-03-15 RX ADMIN — Medication 4 MILLIGRAM(S): at 12:50

## 2025-03-15 RX ADMIN — ANASTROZOLE 1 MILLIGRAM(S): 1 TABLET ORAL at 12:50

## 2025-03-15 RX ADMIN — HEPARIN SODIUM 5000 UNIT(S): 1000 INJECTION INTRAVENOUS; SUBCUTANEOUS at 05:40

## 2025-03-15 RX ADMIN — HEPARIN SODIUM 5000 UNIT(S): 1000 INJECTION INTRAVENOUS; SUBCUTANEOUS at 17:27

## 2025-03-15 RX ADMIN — Medication 81 MILLIGRAM(S): at 12:50

## 2025-03-15 RX ADMIN — SCOPOLAMINE 1 PATCH: 1 PATCH, EXTENDED RELEASE TRANSDERMAL at 00:00

## 2025-03-15 RX ADMIN — SCOPOLAMINE 1 PATCH: 1 PATCH, EXTENDED RELEASE TRANSDERMAL at 07:59

## 2025-03-15 RX ADMIN — Medication 4 MILLIGRAM(S): at 17:27

## 2025-03-15 RX ADMIN — AMLODIPINE BESYLATE 10 MILLIGRAM(S): 10 TABLET ORAL at 05:33

## 2025-03-15 RX ADMIN — Medication 25 MILLIGRAM(S): at 12:49

## 2025-03-15 RX ADMIN — SCOPOLAMINE 1 PATCH: 1 PATCH, EXTENDED RELEASE TRANSDERMAL at 00:12

## 2025-03-15 RX ADMIN — ATORVASTATIN CALCIUM 40 MILLIGRAM(S): 80 TABLET, FILM COATED ORAL at 21:51

## 2025-03-15 RX ADMIN — Medication 20 MILLIGRAM(S): at 05:32

## 2025-03-15 RX ADMIN — Medication 4 MILLIGRAM(S): at 00:11

## 2025-03-15 NOTE — PROGRESS NOTE ADULT - PROBLEM SELECTOR PLAN 5
Code Status: Full Code  DVT PPx: subcu heparin  PT: recommend outpatient vestibular physical therapy  Dispo: pending hospital course Code Status: Full Code  DVT PPx: subq heparin  PT: recommend outpatient vestibular physical therapy  Dispo: pending hospital course

## 2025-03-15 NOTE — PROGRESS NOTE ADULT - SUBJECTIVE AND OBJECTIVE BOX
INTERVAL HPI/OVERNIGHT EVENTS: no acute events overnight    SUBJECTIVE: Patient seen and examined at bedside. Denies fever, chills, chest pain, SOB, cough, abdominal pain, n/v/d, muscle or joint pain, headache, focal numbness or weakness and any other acute pain or sx at this time.     ROS: All negative except as listed above.    VITAL SIGNS:  ICU Vital Signs Last 24 Hrs  T(C): 36.3 (15 Mar 2025 05:00), Max: 37.1 (14 Mar 2025 12:48)  T(F): 97.3 (15 Mar 2025 05:00), Max: 98.8 (14 Mar 2025 12:48)  HR: 63 (15 Mar 2025 05:00) (52 - 63)  BP: 150/69 (15 Mar 2025 05:00) (127/64 - 166/87)  BP(mean): --  ABP: --  ABP(mean): --  RR: 17 (15 Mar 2025 05:00) (16 - 18)  SpO2: 100% (15 Mar 2025 05:00) (97% - 100%)    O2 Parameters below as of 15 Mar 2025 05:00  Patient On (Oxygen Delivery Method): room air            Plateau pressure:   P/F ratio:     03-14 @ 07:01  -  03-15 @ 07:00  --------------------------------------------------------  IN: 0 mL / OUT: 600 mL / NET: -600 mL      CAPILLARY BLOOD GLUCOSE      ECG: reviewed.    PHYSICAL EXAM:    GENERAL: NAD, lying in bed comfortably  HEAD:  Atraumatic, normocephalic  EYES: EOMI, PERRLA, conjunctiva and sclera clear  HEART: Regular rate and rhythm, no murmurs, rubs, or gallops  LUNGS: Unlabored respirations.  Clear to auscultation bilaterally, no crackles, wheezing, or rhonchi  ABDOMEN: Soft, nontender, nondistended, +BS  EXTREMITIES: No edema  NERVOUS SYSTEM:  A&Ox3, following commands, moving all extremities, no focal deficits     MEDICATIONS:  MEDICATIONS  (STANDING):  amLODIPine   Tablet 10 milliGRAM(s) Oral daily  anastrozole 1 milliGRAM(s) Oral daily  aspirin  chewable 81 milliGRAM(s) Oral daily  atorvastatin 40 milliGRAM(s) Oral at bedtime  chlorhexidine 2% Cloths 1 Application(s) Topical daily  enalapril 20 milliGRAM(s) Oral daily  heparin   Injectable 5000 Unit(s) SubCutaneous every 12 hours  ondansetron Injectable 4 milliGRAM(s) IV Push every 6 hours  scopolamine 1 mG/72 Hr(s) Patch 1 Patch Transdermal every 72 hours    MEDICATIONS  (PRN):  diazepam    Tablet 5 milliGRAM(s) Oral every 8 hours PRN dizziness  meclizine 25 milliGRAM(s) Oral every 8 hours PRN Dizziness      ALLERGIES:  Allergies    morphine (Pruritus)  adhesives (Rash)  Dilaudid (Other)    Intolerances        LABS:                        11.8   6.36  )-----------( 216      ( 14 Mar 2025 06:06 )             35.5     03-14    142  |  106  |  19  ----------------------------<  103[H]  4.3   |  21[L]  |  0.70    Ca    9.1      14 Mar 2025 06:06  Phos  4.3     03-14  Mg     1.90     03-14    TPro  6.5  /  Alb  3.5  /  TBili  0.6  /  DBili  x   /  AST  12  /  ALT  8   /  AlkPhos  80  03-14      Urinalysis Basic - ( 14 Mar 2025 06:06 )    Color: x / Appearance: x / SG: x / pH: x  Gluc: 103 mg/dL / Ketone: x  / Bili: x / Urobili: x   Blood: x / Protein: x / Nitrite: x   Leuk Esterase: x / RBC: x / WBC x   Sq Epi: x / Non Sq Epi: x / Bacteria: x      ABG:      vBG:    Micro:     Urinalysis with Rflx Culture (collected 03-12-25 @ 04:47)         RADIOLOGY & ADDITIONAL TESTS: Reviewed.   INTERVAL HPI/OVERNIGHT EVENTS: no acute events overnight    SUBJECTIVE: Patient seen and examined at bedside. Patient reports dizziness is gradually improving. States that she was able to work with PT yesterday but felt unbalanced when walking. Denies fever, chills, chest pain, SOB, cough, abdominal pain, n/v/d, muscle or joint pain, headache, focal numbness or weakness and any other acute pain or sx at this time.     ROS: All negative except as listed above.    VITAL SIGNS:  ICU Vital Signs Last 24 Hrs  T(C): 36.3 (15 Mar 2025 05:00), Max: 37.1 (14 Mar 2025 12:48)  T(F): 97.3 (15 Mar 2025 05:00), Max: 98.8 (14 Mar 2025 12:48)  HR: 63 (15 Mar 2025 05:00) (52 - 63)  BP: 150/69 (15 Mar 2025 05:00) (127/64 - 166/87)  BP(mean): --  ABP: --  ABP(mean): --  RR: 17 (15 Mar 2025 05:00) (16 - 18)  SpO2: 100% (15 Mar 2025 05:00) (97% - 100%)    O2 Parameters below as of 15 Mar 2025 05:00  Patient On (Oxygen Delivery Method): room air            Plateau pressure:   P/F ratio:     03-14 @ 07:01  -  03-15 @ 07:00  --------------------------------------------------------  IN: 0 mL / OUT: 600 mL / NET: -600 mL      CAPILLARY BLOOD GLUCOSE      ECG: reviewed.    PHYSICAL EXAM:    GENERAL: NAD, lying in bed comfortably  HEAD:  Atraumatic, normocephalic  EYES: EOMI, PERRLA, conjunctiva and sclera clear  HEART: Regular rate and rhythm, no murmurs, rubs, or gallops  LUNGS: Unlabored respirations.  Clear to auscultation bilaterally, no crackles, wheezing, or rhonchi  ABDOMEN: Soft, nontender, nondistended, +BS  EXTREMITIES: No edema  NERVOUS SYSTEM:  A&Ox3, following commands, moving all extremities, no focal deficits     MEDICATIONS:  MEDICATIONS  (STANDING):  amLODIPine   Tablet 10 milliGRAM(s) Oral daily  anastrozole 1 milliGRAM(s) Oral daily  aspirin  chewable 81 milliGRAM(s) Oral daily  atorvastatin 40 milliGRAM(s) Oral at bedtime  chlorhexidine 2% Cloths 1 Application(s) Topical daily  enalapril 20 milliGRAM(s) Oral daily  heparin   Injectable 5000 Unit(s) SubCutaneous every 12 hours  ondansetron Injectable 4 milliGRAM(s) IV Push every 6 hours  scopolamine 1 mG/72 Hr(s) Patch 1 Patch Transdermal every 72 hours    MEDICATIONS  (PRN):  diazepam    Tablet 5 milliGRAM(s) Oral every 8 hours PRN dizziness  meclizine 25 milliGRAM(s) Oral every 8 hours PRN Dizziness      ALLERGIES:  Allergies    morphine (Pruritus)  adhesives (Rash)  Dilaudid (Other)    Intolerances        LABS:                        11.8   6.36  )-----------( 216      ( 14 Mar 2025 06:06 )             35.5     03-14    142  |  106  |  19  ----------------------------<  103[H]  4.3   |  21[L]  |  0.70    Ca    9.1      14 Mar 2025 06:06  Phos  4.3     03-14  Mg     1.90     03-14    TPro  6.5  /  Alb  3.5  /  TBili  0.6  /  DBili  x   /  AST  12  /  ALT  8   /  AlkPhos  80  03-14      Urinalysis Basic - ( 14 Mar 2025 06:06 )    Color: x / Appearance: x / SG: x / pH: x  Gluc: 103 mg/dL / Ketone: x  / Bili: x / Urobili: x   Blood: x / Protein: x / Nitrite: x   Leuk Esterase: x / RBC: x / WBC x   Sq Epi: x / Non Sq Epi: x / Bacteria: x      ABG:      vBG:    Micro:     Urinalysis with Rflx Culture (collected 03-12-25 @ 04:47)         RADIOLOGY & ADDITIONAL TESTS: Reviewed.

## 2025-03-15 NOTE — PROGRESS NOTE ADULT - PROBLEM SELECTOR PLAN 1
orthostatic vitals wnl    PLAN  Neurology believes history and symptoms most consistent with peripheral etiology of vertigo. Possibly localizing to left ear dysfunction. Low concern for posterior circulation dysfunction  PT recommend outpatient vestibular physical therapy  - Patient may benefit from Epley  - Symptomatic management with Meclizine 25mg TID PRN (up to 1 week)  - Diazepam PRN and zofran ATC for now   - ENT follow-up post discharge   - Neuro-checks and VS q4h  - BP goal normotension 120/80   - EKG sinus bradycardia  - Fall precautions orthostatic vitals wnl    PLAN  Neurology believes history and symptoms most consistent with peripheral etiology of vertigo. Possibly localizing to left ear dysfunction. Low concern for posterior circulation dysfunction  PT recommend outpatient vestibular physical therapy  - Symptomatic management with Meclizine 25mg TID PRN (up to 1 week)  - Diazepam PRN and zofran ATC for now   - ENT follow-up post discharge   - Neuro-checks and VS q4h  - BP goal normotension 120/80   - EKG sinus bradycardia  - Fall precautions orthostatic vitals wnl  no acute intracranial abormalities noted on MRA and CTA head and neck    PLAN  Neurology believes history and symptoms most consistent with peripheral etiology of vertigo. Possibly localizing to left ear dysfunction. Low concern for posterior circulation dysfunction  PT recommend outpatient vestibular physical therapy  - Symptomatic management with Meclizine 25mg TID PRN (up to 1 week)  - Diazepam PRN and zofran ATC for now   - ENT follow-up post discharge   - Neuro-checks and VS q4h  - BP goal normotension 120/80   - EKG sinus bradycardia  - Fall precautions

## 2025-03-16 VITALS
TEMPERATURE: 98 F | HEART RATE: 60 BPM | DIASTOLIC BLOOD PRESSURE: 56 MMHG | OXYGEN SATURATION: 96 % | RESPIRATION RATE: 17 BRPM | SYSTOLIC BLOOD PRESSURE: 117 MMHG

## 2025-03-16 LAB
ALBUMIN SERPL ELPH-MCNC: 3.5 G/DL — SIGNIFICANT CHANGE UP (ref 3.3–5)
ALP SERPL-CCNC: 76 U/L — SIGNIFICANT CHANGE UP (ref 40–120)
ALT FLD-CCNC: 10 U/L — SIGNIFICANT CHANGE UP (ref 4–33)
ANION GAP SERPL CALC-SCNC: 13 MMOL/L — SIGNIFICANT CHANGE UP (ref 7–14)
AST SERPL-CCNC: 12 U/L — SIGNIFICANT CHANGE UP (ref 4–32)
BASOPHILS # BLD AUTO: 0.02 K/UL — SIGNIFICANT CHANGE UP (ref 0–0.2)
BASOPHILS NFR BLD AUTO: 0.3 % — SIGNIFICANT CHANGE UP (ref 0–2)
BILIRUB SERPL-MCNC: 0.4 MG/DL — SIGNIFICANT CHANGE UP (ref 0.2–1.2)
BUN SERPL-MCNC: 21 MG/DL — SIGNIFICANT CHANGE UP (ref 7–23)
CALCIUM SERPL-MCNC: 8.9 MG/DL — SIGNIFICANT CHANGE UP (ref 8.4–10.5)
CHLORIDE SERPL-SCNC: 107 MMOL/L — SIGNIFICANT CHANGE UP (ref 98–107)
CO2 SERPL-SCNC: 21 MMOL/L — LOW (ref 22–31)
CREAT SERPL-MCNC: 0.74 MG/DL — SIGNIFICANT CHANGE UP (ref 0.5–1.3)
EGFR: 87 ML/MIN/1.73M2 — SIGNIFICANT CHANGE UP
EGFR: 87 ML/MIN/1.73M2 — SIGNIFICANT CHANGE UP
EOSINOPHIL # BLD AUTO: 0.18 K/UL — SIGNIFICANT CHANGE UP (ref 0–0.5)
EOSINOPHIL NFR BLD AUTO: 2.7 % — SIGNIFICANT CHANGE UP (ref 0–6)
GLUCOSE SERPL-MCNC: 157 MG/DL — HIGH (ref 70–99)
HCT VFR BLD CALC: 35.4 % — SIGNIFICANT CHANGE UP (ref 34.5–45)
HGB BLD-MCNC: 11.8 G/DL — SIGNIFICANT CHANGE UP (ref 11.5–15.5)
IANC: 4.8 K/UL — SIGNIFICANT CHANGE UP (ref 1.8–7.4)
IMM GRANULOCYTES NFR BLD AUTO: 0.5 % — SIGNIFICANT CHANGE UP (ref 0–0.9)
LYMPHOCYTES # BLD AUTO: 1.26 K/UL — SIGNIFICANT CHANGE UP (ref 1–3.3)
LYMPHOCYTES # BLD AUTO: 19.1 % — SIGNIFICANT CHANGE UP (ref 13–44)
MAGNESIUM SERPL-MCNC: 1.9 MG/DL — SIGNIFICANT CHANGE UP (ref 1.6–2.6)
MCHC RBC-ENTMCNC: 30.6 PG — SIGNIFICANT CHANGE UP (ref 27–34)
MCHC RBC-ENTMCNC: 33.3 G/DL — SIGNIFICANT CHANGE UP (ref 32–36)
MCV RBC AUTO: 91.7 FL — SIGNIFICANT CHANGE UP (ref 80–100)
MONOCYTES # BLD AUTO: 0.3 K/UL — SIGNIFICANT CHANGE UP (ref 0–0.9)
MONOCYTES NFR BLD AUTO: 4.6 % — SIGNIFICANT CHANGE UP (ref 2–14)
NEUTROPHILS # BLD AUTO: 4.8 K/UL — SIGNIFICANT CHANGE UP (ref 1.8–7.4)
NEUTROPHILS NFR BLD AUTO: 72.8 % — SIGNIFICANT CHANGE UP (ref 43–77)
NRBC # BLD AUTO: 0 K/UL — SIGNIFICANT CHANGE UP (ref 0–0)
NRBC # FLD: 0 K/UL — SIGNIFICANT CHANGE UP (ref 0–0)
PHOSPHATE SERPL-MCNC: 3.7 MG/DL — SIGNIFICANT CHANGE UP (ref 2.5–4.5)
PLATELET # BLD AUTO: 226 K/UL — SIGNIFICANT CHANGE UP (ref 150–400)
POTASSIUM SERPL-MCNC: 4.2 MMOL/L — SIGNIFICANT CHANGE UP (ref 3.5–5.3)
POTASSIUM SERPL-SCNC: 4.2 MMOL/L — SIGNIFICANT CHANGE UP (ref 3.5–5.3)
PROT SERPL-MCNC: 6.4 G/DL — SIGNIFICANT CHANGE UP (ref 6–8.3)
RBC # BLD: 3.86 M/UL — SIGNIFICANT CHANGE UP (ref 3.8–5.2)
RBC # FLD: 12.9 % — SIGNIFICANT CHANGE UP (ref 10.3–14.5)
SODIUM SERPL-SCNC: 141 MMOL/L — SIGNIFICANT CHANGE UP (ref 135–145)
WBC # BLD: 6.59 K/UL — SIGNIFICANT CHANGE UP (ref 3.8–10.5)
WBC # FLD AUTO: 6.59 K/UL — SIGNIFICANT CHANGE UP (ref 3.8–10.5)

## 2025-03-16 PROCEDURE — 99239 HOSP IP/OBS DSCHRG MGMT >30: CPT | Mod: GC

## 2025-03-16 RX ORDER — ENALAPRIL MALEATE 20 MG
1 TABLET ORAL
Qty: 0 | Refills: 0 | DISCHARGE
Start: 2025-03-16

## 2025-03-16 RX ORDER — MECLIZINE HCL 12.5 MG
1 TABLET ORAL
Qty: 12 | Refills: 0
Start: 2025-03-16 | End: 2025-03-18

## 2025-03-16 RX ORDER — SPIRONOLACTONE 25 MG
1 TABLET ORAL
Refills: 0 | DISCHARGE

## 2025-03-16 RX ORDER — ENALAPRIL MALEATE 20 MG
1 TABLET ORAL
Refills: 0 | DISCHARGE

## 2025-03-16 RX ORDER — METOPROLOL SUCCINATE 50 MG/1
1 TABLET, EXTENDED RELEASE ORAL
Refills: 0 | DISCHARGE

## 2025-03-16 RX ORDER — ONDANSETRON HCL/PF 4 MG/2 ML
1 VIAL (ML) INJECTION
Qty: 9 | Refills: 0
Start: 2025-03-16 | End: 2025-03-18

## 2025-03-16 RX ADMIN — Medication 81 MILLIGRAM(S): at 11:12

## 2025-03-16 RX ADMIN — HEPARIN SODIUM 5000 UNIT(S): 1000 INJECTION INTRAVENOUS; SUBCUTANEOUS at 05:04

## 2025-03-16 RX ADMIN — Medication 20 MILLIGRAM(S): at 05:04

## 2025-03-16 RX ADMIN — Medication 4 MILLIGRAM(S): at 05:05

## 2025-03-16 RX ADMIN — ANASTROZOLE 1 MILLIGRAM(S): 1 TABLET ORAL at 11:12

## 2025-03-16 RX ADMIN — Medication 1 APPLICATION(S): at 11:18

## 2025-03-16 RX ADMIN — SCOPOLAMINE 1 PATCH: 1 PATCH, EXTENDED RELEASE TRANSDERMAL at 08:00

## 2025-03-16 RX ADMIN — AMLODIPINE BESYLATE 10 MILLIGRAM(S): 10 TABLET ORAL at 05:04

## 2025-03-16 RX ADMIN — Medication 4 MILLIGRAM(S): at 11:13

## 2025-03-16 RX ADMIN — Medication 4 MILLIGRAM(S): at 00:12

## 2025-03-16 NOTE — DISCHARGE NOTE NURSING/CASE MANAGEMENT/SOCIAL WORK - PATIENT PORTAL LINK FT
You can access the FollowMyHealth Patient Portal offered by [facility] by registering at the following website: [email or web address]. By joining [facility]’s FollowMyHealth portal, you will also be able to view your health information using other applications (apps) compatible with our system.

## 2025-03-16 NOTE — PROGRESS NOTE ADULT - ASSESSMENT
DIETER GUILLORY is a 70y (1955) woman with a PMH of hypertension, hyperlipidemia, CAD s/p stent (most recent 2022 on aspirin), vertigo who presents with dizziness.  Had severe vertigo attacks in the past with neg MRi in 2019  CTH neg  CTA no significant stenosis - mild bilateral carotid athero and R vert calcifications  MRI brain ne or acute infarct    Impression:    Episodic room spinning sensation and nausea associated with head movement, likely peripheral etiology of vertigo. Possibly localizing to left ear dysfunction. Low concern for posterior circulation dysfunction - MRI neg    Recommendations:    - Symptomatic management with Meclizine 25mg TID PRN (up to 1 week) ,  zofran PRN   - can also try low dose klonopin 0.5mg BID prn as meclizine not helping  - Outpatient vestibular rehab and ENT follow-up post discharge   - Neuro-checks and VS q4h  - BP goal normotension 120/80   - EKG, telemetry, orthostatic vitals   - Fall precautions  - PT/ OT as tolerated     dc planning when symptoms improve    
70 year old female with hx/o hypertension, hyperlipidemia, CAD s/p stent (most recent 2022 on aspirin and plavix), vertigo,  left breast cancer s/p lumpectomy on anastrozole who presents with 3d of acute positional dizziness and nausea.

## 2025-03-16 NOTE — PROGRESS NOTE ADULT - ATTENDING COMMENTS
Pt seen and examined, agree with the note done by HS, briefly this is a 70 year old female with hx/o hypertension, hyperlipidemia, CAD s/p stent (most recent 2022 on aspirin and plavix), vertigo last episode about 5 yrs ago,  left breast cancer s/p lumpectomy on anastrozole who p/w acute positional dizziness and nausea . Based on history and physical exam concern is for BPPV, CTH neg and normal neuro exam.    -obtained orthostatics and it was negative  -symptom management as stated above  -neuro recs appreciated. Vestibular rehab and ENT f/u outpt  -Noted bradycardia w/ HR in the 50s. Pt not on any AV joyce blockers here per family pt was on metoprolol succinate 25mg daily. Asymptomatic from a bradycardic standpoint. EKG obtained to assess for sinus anirudh vs conduction etiology. EKG on 3/15 with sinus aniurdh at 53/mt,  tsh and free t4 wnl    Dc today, dc planning time spent in coordination 45mts ( preparing dc summary and plan, discussion with pt's family)    PT and neuro rec vestibular rehab.     Plan discussed with HS.
70 year old female with hx/o hypertension, hyperlipidemia, CAD s/p stent (most recent 2022 on aspirin and plavix), vertigo last episode about 5 yrs ago,  left breast cancer s/p lumpectomy on anastrozole who p/w acute positional dizziness and nausea . Based on history and physical exam concern is for BPPV, CTH neg and normal neuro exam.    -obtained orthostatics and it was negative  -symptom management as stated above  -neuro recs appreciated. Vestibular rehab and ENT f/u outpt  -Noted bradycardia w/ HR in the 50s. Pt not on any AV joyce blockers. Asymptomatic from a bradycardic standpoint. EKG obtained to assess for sinus anirudh vs conduction etiology.  Monitor for now     Dc planning for tomorrow/sunday pending continued improvement in symptoms. PT and neuro rec vestibular rehab.
Pt seen and examined, agree with the note done by HS, briefly this is a 70 year old female with hx/o hypertension, hyperlipidemia, CAD s/p stent (most recent 2022 on aspirin and plavix), vertigo last episode about 5 yrs ago,  left breast cancer s/p lumpectomy on anastrozole who p/w acute positional dizziness and nausea . Based on history and physical exam concern is for BPPV, CTH neg and normal neuro exam.    -obtained orthostatics and it was negative  -symptom management as stated above  -neuro recs appreciated. Vestibular rehab and ENT f/u outpt  -Noted bradycardia w/ HR in the 50s. Pt not on any AV joyce blockers here per family pt was on metoprolol succinate 25mg daily. Asymptomatic from a bradycardic standpoint. EKG obtained to assess for sinus anirudh vs conduction etiology. EKG today with sinus anirudh at 53/mt, will check tsh and free t4    Dc planning for tomorrow pending improvement in symptoms. PT and neuro rec vestibular rehab.     Plan discussed with HS.

## 2025-03-16 NOTE — PROVIDER CONTACT NOTE (OTHER) - SITUATION
/57
HR 54
/56
HR 15
HR 55
Patient heart rate 52.
HR 50
HR 54
HR 55
HR 59
Patient blood pressure 144/54 and heart rate 55.
HR 52
HR 52, /56
Patient heart rate 58.
/47
HR 50

## 2025-03-16 NOTE — PROGRESS NOTE ADULT - PROBLEM SELECTOR PLAN 4
- cont anastrozole  - Cont outpt follow up

## 2025-03-16 NOTE — PROGRESS NOTE ADULT - PROBLEM SELECTOR PLAN 5
Code Status: Full Code  DVT PPx: subq heparin  PT: recommend outpatient vestibular physical therapy  Dispo: pending hospital course Code Status: Full Code  DVT PPx: subq heparin  PT: recommend outpatient vestibular physical therapy  Dispo: Medically optimzied for DC

## 2025-03-16 NOTE — PROGRESS NOTE ADULT - PROBLEM SELECTOR PLAN 1
orthostatic vitals wnl  no acute intracranial abormalities noted on MRA and CTA head and neck    PLAN  Neurology believes history and symptoms most consistent with peripheral etiology of vertigo. Possibly localizing to left ear dysfunction. Low concern for posterior circulation dysfunction  PT recommend outpatient vestibular physical therapy  - Symptomatic management with Meclizine 25mg TID PRN (up to 1 week)  - Diazepam PRN and zofran ATC for now   - ENT follow-up post discharge   - Neuro-checks and VS q4h  - BP goal normotension 120/80   - EKG sinus bradycardia  - Fall precautions orthostatic vitals wnl  no acute intracranial abormalities noted on MRA and CTA head and neck    PLAN  Neurology believes history and symptoms most consistent with peripheral etiology of vertigo. Possibly localizing to left ear dysfunction. Low concern for posterior circulation dysfunction  PT recommend outpatient vestibular physical therapy  - Symptomatic management with Meclizine 25mg TID PRN (up to 1 week)  - Diazepam PRN and zofran ATC for now   - ENT follow-up post discharge   - Neuro-checks and VS q4h  - BP goal normotension 120/80   - EKG sinus bradycardia-> TsH and T4 wnl   - Fall precautions

## 2025-03-16 NOTE — DISCHARGE NOTE NURSING/CASE MANAGEMENT/SOCIAL WORK - NSDCPEFALRISK_GEN_ALL_CORE
For information on Fall & Injury Prevention, visit: [email or web address] OR  [email or web address] OR  https://www.cdc.gov/steadi/patient.html

## 2025-03-16 NOTE — PROVIDER CONTACT NOTE (OTHER) - REASON
/56
HR 52, /56
/47
HR 50
HR 59
HR 52
HR 54
HR 55
/57
HR 54
HR 55
Patient heart rate 52.
HR 50
HR 51
Patient blood pressure 144/54 and heart rate 55.
Patient heart rate 58.

## 2025-03-16 NOTE — PROVIDER CONTACT NOTE (OTHER) - ASSESSMENT
A&Ox4. Generalized weakness. No n/v reported. No further s/s of acute distress.
A&Ox4. Generalized weakness. No n/v reported. No further s/s of acute distress.
Patient assessed. No signs or symptoms of acute distress noted.
Patient assessment complete. HR 54 /57 temp 98.1 O2 sat 99% on RA. Pt reports dizziness.
Patient assessment complete. HR 51 /47 temp 98.2 O2 sat 98% on RA. Pt reports dizziness.
Patient assessment complete. HR 51 /47 temp 98.2 O2 sat 98% on RA. Pt reports dizziness.
pt a&0 4, no signs of distress noted
A&Ox4. PT reports less dizziness. Generalized weakness. No n/v reported. No further s/s of acute distress.
Patient assessed. No signs or symptoms of acute distress noted.
Patient assessed. No signs or symptoms of acute distress noted.
Patient assessment complete. HR 54 /57 temp 98.1 O2 sat 99% on RA. Pt reports dizziness.

## 2025-03-16 NOTE — PROGRESS NOTE ADULT - PROBLEM SELECTOR PLAN 2
- cont home medications  - hold metoprolol and spironolactone in recent setting of bradycardic events (HR to 49), will cont to monitor vitals

## 2025-03-16 NOTE — PROVIDER CONTACT NOTE (OTHER) - BACKGROUND
PT admitted for dizziness and giddiness. PMH includes CAD, breast cancer, arthritis, HTN, and hyperlipidemia.
PT admitted for dizziness and giddiness. PMH includes CAD, breast cancer, arthritis, HTN, and hyperlipidemia.
Patient admitted with dizziness and giddiness.  PMH breast cancer, arthritis, hypertension, hyperlipidemia
Patient admitted with vertigo. Hx of cad, breast cancer, arthritis, htn, and hld
70 year old female admitted for dizziness and giddiness. History of vertigo, left breast CA with lumpectomy, and CAD.
Patient admitted with vertigo. Hx of cad, breast cancer, arthritis, htn, and hld
Patient admitted with dizziness and giddiness.  PMH breast cancer, arthritis, hypertension, hyperlipidemia
70 year old female admitted for dizziness and giddiness. History of vertigo, left breast CA with lumpectomy, and CAD.
70 year old female admitted for dizziness and giddiness. History of vertigo, left breast CA with lumpectomy, and CAD.
Patient admitted with vertigo. Hx of cad, breast cancer, arthritis, htn, and hld
70 year old female admitted for dizziness and giddiness. History of vertigo, left breast CA with lumpectomy, and CAD.
PT admitted for dizziness and giddiness. PMH includes CAD, breast cancer, arthritis, HTN, and hyperlipidemia.
Patient admitted with vertigo. Hx of cad, breast cancer, arthritis, htn, and hld
Patient admitted with vertigo. Hx of cad, breast cancer, arthritis, htn, and hld
Patient admitted with dizziness and giddiness.  PMH breast cancer, arthritis, hypertension, hyperlipidemia
Patient admitted with vertigo. Hx of cad, breast cancer, arthritis, htn, and hld

## 2025-03-16 NOTE — PROVIDER CONTACT NOTE (OTHER) - NAME OF MD/NP/PA/DO NOTIFIED:
Pricila Flowers
Daniel Dahl MD
MD Jabari Flores
Daniel Dahl MD
MD Jabari Flores
MD Jabari Flores
Pricila Flowers
Daniel Dahl
MD Jabari Flores
Daniel Dahl MD
MD Jabari Flores
MD Kings Foster
Pricila Flowers
Daniel Dahl
MD Jabari Flores
MD Jabari Flores

## 2025-03-16 NOTE — PROGRESS NOTE ADULT - PROBLEM SELECTOR PLAN 3
- cont home medications  - Outpt follow up as needed

## 2025-03-16 NOTE — PROVIDER CONTACT NOTE (OTHER) - RECOMMENDATIONS
Notify MD Jabari Flores.
Notify MD Jabari Flores.
Pricila Flowers notified
Daniel Dahl notified
Notify MD.
Notify MD.
Notify MD. Hold amlodipine according to order.
Notify MD.
Pricila Flowers notified
Pricila Flowers notified
Daniel Dahl notified
Notify MD Jabari Flores.
Notify MD.
notify MD Kings Foster
Notify MD Jabari Flores.
Notify MD.

## 2025-03-16 NOTE — DISCHARGE NOTE NURSING/CASE MANAGEMENT/SOCIAL WORK - NSDCFUADDAPPT_GEN_ALL_CORE_FT
APPTS ARE READY TO BE MADE: [X ] YES    Best Family or Patient Contact (if needed):    Additional Information about above appointments (if needed):    1: Outpatient vestibular rehab  2: ENT follow-up post discharge  3: PCP  4. Neurology    Other comments or requests:

## 2025-03-16 NOTE — DISCHARGE NOTE NURSING/CASE MANAGEMENT/SOCIAL WORK - FINANCIAL ASSISTANCE
[facility] provides services at a reduced cost to those who are determined to be eligible through [facility]’s financial assistance program. Information regarding [facility]’s financial assistance program can be found by going to [email or web address] or by calling [phone].

## 2025-03-16 NOTE — PROVIDER CONTACT NOTE (OTHER) - ACTION/TREATMENT ORDERED:
Awaiting orders
Awaiting orders
MD made aware. New interventions ordered at this time.
Awaiting orders
MD Jabari Flores made aware. EKG ordered for bradycardia to rule out conduction abnormalities.
MD Kings Foster aware, no new interventions at this time
MD Jabari Flores made aware. EKG ordered for bradycardia to rule out conduction abnormalities.
MD notified and aware. Ok to hold amlodipine. Plan of care ongoing.
Awaiting orders
Awaiting orders
MD Jabari Flores made aware. No new interventions ordered at this time.
MD notified and aware. No further interventions ordered at this time. Plan of care ongoing.
MD Jabari Flores made aware. No new interventions ordered at this time.
MD made aware. EKG ordered.
MD made aware. New interventions ordered at this time.
MD notified and aware. No further interventions ordered at this time. Plan of care ongoing.

## 2025-03-16 NOTE — PROGRESS NOTE ADULT - SUBJECTIVE AND OBJECTIVE BOX
***************************************************************  Kings Foster, PGY2  Internal Medicine   Preferred contact via [employer] Teams   ***************************************************************    DIETER GUILLORY  70y  MRN: [id number]    Patient is a 70y old  Female who presents with a chief complaint of dizziness (15 Mar 2025 07:09)      Interval/Overnight Events: no events ON.     Subjective: Pt seen and examined at bedside. Denies fever, CP, SOB, abn pain, N/V, dysuria. Tolerating diet.      MEDICATIONS  (STANDING):  amLODIPine   Tablet 10 milliGRAM(s) Oral daily  anastrozole 1 milliGRAM(s) Oral daily  aspirin  chewable 81 milliGRAM(s) Oral daily  atorvastatin 40 milliGRAM(s) Oral at bedtime  chlorhexidine 2% Cloths 1 Application(s) Topical daily  enalapril 20 milliGRAM(s) Oral daily  heparin   Injectable 5000 Unit(s) SubCutaneous every 12 hours  ondansetron Injectable 4 milliGRAM(s) IV Push every 6 hours  scopolamine 1 mG/72 Hr(s) Patch 1 Patch Transdermal every 72 hours    MEDICATIONS  (PRN):  diazepam    Tablet 5 milliGRAM(s) Oral every 8 hours PRN dizziness  meclizine 25 milliGRAM(s) Oral every 8 hours PRN Dizziness      Objective:    Vitals: Vital Signs Last 24 Hrs  T(C): 36.4 (03-16-25 @ 05:00), Max: 37 (03-15-25 @ 17:00)  T(F): 97.6 (03-16-25 @ 05:00), Max: 98.6 (03-15-25 @ 17:00)  HR: 52 (03-16-25 @ 05:00) (50 - 60)  BP: 126/56 (03-16-25 @ 05:00) (124/57 - 133/64)  BP(mean): --  RR: 16 (03-16-25 @ 05:00) (16 - 18)  SpO2: 98% (03-16-25 @ 05:00) (98% - 99%)                I&O's Summary      PHYSICAL EXAM:  GENERAL: NAD  HEAD:  Atraumatic, Normocephalic  EYES: EOMI, conjunctiva and sclera clear  CHEST/LUNG: Clear to auscultation bilaterally; No rales, rhonchi, wheezing, or rubs  HEART: Regular rate and rhythm; No murmurs, rubs, or gallops  ABDOMEN: Soft, Nontender, Nondistended;   SKIN: No rashes or lesions  NERVOUS SYSTEM:  Alert & Oriented X3, no focal deficits    LABS:                        11.7   7.03  )-----------( 223      ( 15 Mar 2025 06:40 )             35.5                         11.8   6.36  )-----------( 216      ( 14 Mar 2025 06:06 )             35.5     03-15    140  |  107  |  19  ----------------------------<  126[H]  4.4   |  19[L]  |  0.72  03-14    142  |  106  |  19  ----------------------------<  103[H]  4.3   |  21[L]  |  0.70    Ca    8.9      15 Mar 2025 06:40  Ca    9.1      14 Mar 2025 06:06  Phos  4.1     03-15  Mg     2.10     03-15    TPro  6.4  /  Alb  3.6  /  TBili  0.4  /  DBili  x   /  AST  15  /  ALT  10  /  AlkPhos  75  03-15  TPro  6.5  /  Alb  3.5  /  TBili  0.6  /  DBili  x   /  AST  12  /  ALT  8   /  AlkPhos  80  03-14    CAPILLARY BLOOD GLUCOSE            Urinalysis Basic - ( 15 Mar 2025 06:40 )    Color: x / Appearance: x / SG: x / pH: x  Gluc: 126 mg/dL / Ketone: x  / Bili: x / Urobili: x   Blood: x / Protein: x / Nitrite: x   Leuk Esterase: x / RBC: x / WBC x   Sq Epi: x / Non Sq Epi: x / Bacteria: x          RADIOLOGY & ADDITIONAL TESTS:    Imaging Personally Reviewed:  [x ] YES  [ ] NO    Consultants involved in case:   Consultant(s) Notes Reviewed:  [ x] YES  [ ] NO:   Care Discussed with Consultants/Other Providers [x ] YES  [ ] NO         ***************************************************************  Kings Foster, PGY2  Internal Medicine   Preferred contact via [employer] Teams   ***************************************************************    DIETER GUILLORY  70y  MRN: [id number]    Patient is a 70y old  Female who presents with a chief complaint of dizziness (15 Mar 2025 07:09)      Interval/Overnight Events: no events ON.     Subjective: Mild dizziness on standing     MEDICATIONS  (STANDING):  amLODIPine   Tablet 10 milliGRAM(s) Oral daily  anastrozole 1 milliGRAM(s) Oral daily  aspirin  chewable 81 milliGRAM(s) Oral daily  atorvastatin 40 milliGRAM(s) Oral at bedtime  chlorhexidine 2% Cloths 1 Application(s) Topical daily  enalapril 20 milliGRAM(s) Oral daily  heparin   Injectable 5000 Unit(s) SubCutaneous every 12 hours  ondansetron Injectable 4 milliGRAM(s) IV Push every 6 hours  scopolamine 1 mG/72 Hr(s) Patch 1 Patch Transdermal every 72 hours    MEDICATIONS  (PRN):  diazepam    Tablet 5 milliGRAM(s) Oral every 8 hours PRN dizziness  meclizine 25 milliGRAM(s) Oral every 8 hours PRN Dizziness      Objective:    Vitals: Vital Signs Last 24 Hrs  T(C): 36.4 (03-16-25 @ 05:00), Max: 37 (03-15-25 @ 17:00)  T(F): 97.6 (03-16-25 @ 05:00), Max: 98.6 (03-15-25 @ 17:00)  HR: 52 (03-16-25 @ 05:00) (50 - 60)  BP: 126/56 (03-16-25 @ 05:00) (124/57 - 133/64)  BP(mean): --  RR: 16 (03-16-25 @ 05:00) (16 - 18)  SpO2: 98% (03-16-25 @ 05:00) (98% - 99%)                I&O's Summary      PHYSICAL EXAM:  GENERAL: NAD  HEAD:  Atraumatic, Normocephalic  EYES: EOMI, conjunctiva and sclera clear  CHEST/LUNG: Clear to auscultation bilaterally; No rales, rhonchi, wheezing, or rubs  HEART: Regular rate and rhythm; No murmurs, rubs, or gallops  ABDOMEN: Soft, Nontender, Nondistended;   SKIN: No rashes or lesions  NERVOUS SYSTEM:  Alert & Oriented X3, no focal deficits    LABS:                        11.7   7.03  )-----------( 223      ( 15 Mar 2025 06:40 )             35.5                         11.8   6.36  )-----------( 216      ( 14 Mar 2025 06:06 )             35.5     03-15    140  |  107  |  19  ----------------------------<  126[H]  4.4   |  19[L]  |  0.72  03-14    142  |  106  |  19  ----------------------------<  103[H]  4.3   |  21[L]  |  0.70    Ca    8.9      15 Mar 2025 06:40  Ca    9.1      14 Mar 2025 06:06  Phos  4.1     03-15  Mg     2.10     03-15    TPro  6.4  /  Alb  3.6  /  TBili  0.4  /  DBili  x   /  AST  15  /  ALT  10  /  AlkPhos  75  03-15  TPro  6.5  /  Alb  3.5  /  TBili  0.6  /  DBili  x   /  AST  12  /  ALT  8   /  AlkPhos  80  03-14    CAPILLARY BLOOD GLUCOSE            Urinalysis Basic - ( 15 Mar 2025 06:40 )    Color: x / Appearance: x / SG: x / pH: x  Gluc: 126 mg/dL / Ketone: x  / Bili: x / Urobili: x   Blood: x / Protein: x / Nitrite: x   Leuk Esterase: x / RBC: x / WBC x   Sq Epi: x / Non Sq Epi: x / Bacteria: x          RADIOLOGY & ADDITIONAL TESTS:    Imaging Personally Reviewed:  [x ] YES  [ ] NO    Consultants involved in case:   Consultant(s) Notes Reviewed:  [ x] YES  [ ] NO:   Care Discussed with Consultants/Other Providers [x ] YES  [ ] NO

## 2025-04-05 ENCOUNTER — NON-APPOINTMENT (OUTPATIENT)
Age: 70
End: 2025-04-05

## 2025-04-05 ENCOUNTER — APPOINTMENT (OUTPATIENT)
Dept: CARDIOLOGY | Facility: CLINIC | Age: 70
End: 2025-04-05

## 2025-04-05 VITALS — DIASTOLIC BLOOD PRESSURE: 76 MMHG | SYSTOLIC BLOOD PRESSURE: 158 MMHG

## 2025-04-05 VITALS
TEMPERATURE: 97.5 F | WEIGHT: 186 LBS | DIASTOLIC BLOOD PRESSURE: 80 MMHG | HEIGHT: 62 IN | HEART RATE: 57 BPM | SYSTOLIC BLOOD PRESSURE: 184 MMHG | OXYGEN SATURATION: 100 % | RESPIRATION RATE: 12 BRPM | BODY MASS INDEX: 34.23 KG/M2

## 2025-04-05 DIAGNOSIS — I10 ESSENTIAL (PRIMARY) HYPERTENSION: ICD-10-CM

## 2025-04-05 LAB
ANION GAP SERPL CALC-SCNC: 11 MMOL/L
BUN SERPL-MCNC: 13 MG/DL
CALCIUM SERPL-MCNC: 9.6 MG/DL
CHLORIDE SERPL-SCNC: 107 MMOL/L
CO2 SERPL-SCNC: 26 MMOL/L
CREAT SERPL-MCNC: 0.67 MG/DL
EGFRCR SERPLBLD CKD-EPI 2021: 94 ML/MIN/1.73M2
GLUCOSE SERPL-MCNC: 138 MG/DL
POTASSIUM SERPL-SCNC: 3.9 MMOL/L
SODIUM SERPL-SCNC: 145 MMOL/L

## 2025-04-05 PROCEDURE — 99215 OFFICE O/P EST HI 40 MIN: CPT | Mod: 25

## 2025-04-05 PROCEDURE — 93000 ELECTROCARDIOGRAM COMPLETE: CPT

## 2025-05-05 ENCOUNTER — NON-APPOINTMENT (OUTPATIENT)
Age: 70
End: 2025-05-05

## 2025-05-05 ENCOUNTER — APPOINTMENT (OUTPATIENT)
Dept: CARDIOLOGY | Facility: CLINIC | Age: 70
End: 2025-05-05
Payer: COMMERCIAL

## 2025-05-05 VITALS
HEART RATE: 65 BPM | HEIGHT: 62 IN | SYSTOLIC BLOOD PRESSURE: 102 MMHG | WEIGHT: 188 LBS | RESPIRATION RATE: 12 BRPM | DIASTOLIC BLOOD PRESSURE: 64 MMHG | BODY MASS INDEX: 34.6 KG/M2 | OXYGEN SATURATION: 98 %

## 2025-05-05 VITALS — SYSTOLIC BLOOD PRESSURE: 122 MMHG | DIASTOLIC BLOOD PRESSURE: 60 MMHG

## 2025-05-05 DIAGNOSIS — I25.10 ATHEROSCLEROTIC HEART DISEASE OF NATIVE CORONARY ARTERY W/OUT ANGINA PECTORIS: ICD-10-CM

## 2025-05-05 DIAGNOSIS — I10 ESSENTIAL (PRIMARY) HYPERTENSION: ICD-10-CM

## 2025-05-05 PROCEDURE — 93000 ELECTROCARDIOGRAM COMPLETE: CPT

## 2025-05-05 PROCEDURE — 99214 OFFICE O/P EST MOD 30 MIN: CPT | Mod: 25

## 2025-05-06 ENCOUNTER — INPATIENT (INPATIENT)
Facility: HOSPITAL | Age: 70
LOS: 0 days | Discharge: HOME | End: 2025-05-07
Attending: INTERNAL MEDICINE | Admitting: INTERNAL MEDICINE
Payer: COMMERCIAL

## 2025-05-06 VITALS
SYSTOLIC BLOOD PRESSURE: 180 MMHG | HEART RATE: 64 BPM | RESPIRATION RATE: 16 BRPM | DIASTOLIC BLOOD PRESSURE: 90 MMHG | TEMPERATURE: 98 F | OXYGEN SATURATION: 100 % | HEIGHT: 62 IN

## 2025-05-06 DIAGNOSIS — Z98.89 OTHER SPECIFIED POSTPROCEDURAL STATES: Chronic | ICD-10-CM

## 2025-05-06 DIAGNOSIS — Z95.5 PRESENCE OF CORONARY ANGIOPLASTY IMPLANT AND GRAFT: Chronic | ICD-10-CM

## 2025-05-06 DIAGNOSIS — R42 DIZZINESS AND GIDDINESS: ICD-10-CM

## 2025-05-06 DIAGNOSIS — Z90.710 ACQUIRED ABSENCE OF BOTH CERVIX AND UTERUS: Chronic | ICD-10-CM

## 2025-05-06 LAB
ALBUMIN SERPL ELPH-MCNC: 4.2 G/DL — SIGNIFICANT CHANGE UP (ref 3.3–5)
ALP SERPL-CCNC: 100 U/L — SIGNIFICANT CHANGE UP (ref 40–120)
ALT FLD-CCNC: 31 U/L — SIGNIFICANT CHANGE UP (ref 4–33)
ANION GAP SERPL CALC-SCNC: 17 MMOL/L — HIGH (ref 7–14)
APTT BLD: 27 SEC — SIGNIFICANT CHANGE UP (ref 26.1–36.8)
AST SERPL-CCNC: 31 U/L — SIGNIFICANT CHANGE UP (ref 4–32)
BASOPHILS # BLD AUTO: 0.02 K/UL — SIGNIFICANT CHANGE UP (ref 0–0.2)
BASOPHILS NFR BLD AUTO: 0.3 % — SIGNIFICANT CHANGE UP (ref 0–2)
BILIRUB SERPL-MCNC: 0.3 MG/DL — SIGNIFICANT CHANGE UP (ref 0.2–1.2)
BUN SERPL-MCNC: 10 MG/DL — SIGNIFICANT CHANGE UP (ref 7–23)
CALCIUM SERPL-MCNC: 9.1 MG/DL — SIGNIFICANT CHANGE UP (ref 8.4–10.5)
CHLORIDE SERPL-SCNC: 103 MMOL/L — SIGNIFICANT CHANGE UP (ref 98–107)
CO2 SERPL-SCNC: 18 MMOL/L — LOW (ref 22–31)
CREAT SERPL-MCNC: 0.58 MG/DL — SIGNIFICANT CHANGE UP (ref 0.5–1.3)
EGFR: 97 ML/MIN/1.73M2 — SIGNIFICANT CHANGE UP
EGFR: 97 ML/MIN/1.73M2 — SIGNIFICANT CHANGE UP
EOSINOPHIL # BLD AUTO: 0.21 K/UL — SIGNIFICANT CHANGE UP (ref 0–0.5)
EOSINOPHIL NFR BLD AUTO: 3.2 % — SIGNIFICANT CHANGE UP (ref 0–6)
GLUCOSE SERPL-MCNC: 143 MG/DL — HIGH (ref 70–99)
HCT VFR BLD CALC: 39 % — SIGNIFICANT CHANGE UP (ref 34.5–45)
HGB BLD-MCNC: 12.9 G/DL — SIGNIFICANT CHANGE UP (ref 11.5–15.5)
IANC: 4.36 K/UL — SIGNIFICANT CHANGE UP (ref 1.8–7.4)
IMM GRANULOCYTES NFR BLD AUTO: 0.5 % — SIGNIFICANT CHANGE UP (ref 0–0.9)
INR BLD: <0.9 RATIO — SIGNIFICANT CHANGE UP (ref 0.85–1.16)
LYMPHOCYTES # BLD AUTO: 1.46 K/UL — SIGNIFICANT CHANGE UP (ref 1–3.3)
LYMPHOCYTES # BLD AUTO: 22.5 % — SIGNIFICANT CHANGE UP (ref 13–44)
MCHC RBC-ENTMCNC: 30.4 PG — SIGNIFICANT CHANGE UP (ref 27–34)
MCHC RBC-ENTMCNC: 33.1 G/DL — SIGNIFICANT CHANGE UP (ref 32–36)
MCV RBC AUTO: 92 FL — SIGNIFICANT CHANGE UP (ref 80–100)
MONOCYTES # BLD AUTO: 0.4 K/UL — SIGNIFICANT CHANGE UP (ref 0–0.9)
MONOCYTES NFR BLD AUTO: 6.2 % — SIGNIFICANT CHANGE UP (ref 2–14)
NEUTROPHILS # BLD AUTO: 4.36 K/UL — SIGNIFICANT CHANGE UP (ref 1.8–7.4)
NEUTROPHILS NFR BLD AUTO: 67.3 % — SIGNIFICANT CHANGE UP (ref 43–77)
NRBC # BLD AUTO: 0 K/UL — SIGNIFICANT CHANGE UP (ref 0–0)
NRBC # FLD: 0 K/UL — SIGNIFICANT CHANGE UP (ref 0–0)
NRBC BLD AUTO-RTO: 0 /100 WBCS — SIGNIFICANT CHANGE UP (ref 0–0)
PLATELET # BLD AUTO: 213 K/UL — SIGNIFICANT CHANGE UP (ref 150–400)
POTASSIUM SERPL-MCNC: 4.9 MMOL/L — SIGNIFICANT CHANGE UP (ref 3.5–5.3)
POTASSIUM SERPL-SCNC: 4.9 MMOL/L — SIGNIFICANT CHANGE UP (ref 3.5–5.3)
PROT SERPL-MCNC: 7.5 G/DL — SIGNIFICANT CHANGE UP (ref 6–8.3)
PROTHROM AB SERPL-ACNC: 10.3 SEC — SIGNIFICANT CHANGE UP (ref 9.9–13.4)
RBC # BLD: 4.24 M/UL — SIGNIFICANT CHANGE UP (ref 3.8–5.2)
RBC # FLD: 13.2 % — SIGNIFICANT CHANGE UP (ref 10.3–14.5)
SODIUM SERPL-SCNC: 138 MMOL/L — SIGNIFICANT CHANGE UP (ref 135–145)
TROPONIN T, HIGH SENSITIVITY RESULT: 7 NG/L — SIGNIFICANT CHANGE UP
WBC # BLD: 6.48 K/UL — SIGNIFICANT CHANGE UP (ref 3.8–10.5)
WBC # FLD AUTO: 6.48 K/UL — SIGNIFICANT CHANGE UP (ref 3.8–10.5)

## 2025-05-06 PROCEDURE — 70450 CT HEAD/BRAIN W/O DYE: CPT | Mod: 26,59

## 2025-05-06 PROCEDURE — 99223 1ST HOSP IP/OBS HIGH 75: CPT

## 2025-05-06 PROCEDURE — 70498 CT ANGIOGRAPHY NECK: CPT | Mod: 26

## 2025-05-06 PROCEDURE — 99291 CRITICAL CARE FIRST HOUR: CPT

## 2025-05-06 PROCEDURE — 71045 X-RAY EXAM CHEST 1 VIEW: CPT | Mod: 26

## 2025-05-06 PROCEDURE — 70496 CT ANGIOGRAPHY HEAD: CPT | Mod: 26

## 2025-05-06 PROCEDURE — 0042T: CPT

## 2025-05-06 RX ORDER — CLOPIDOGREL BISULFATE 75 MG/1
300 TABLET, FILM COATED ORAL ONCE
Refills: 0 | Status: COMPLETED | OUTPATIENT
Start: 2025-05-06 | End: 2025-05-06

## 2025-05-06 RX ORDER — ATORVASTATIN CALCIUM 80 MG/1
80 TABLET, FILM COATED ORAL AT BEDTIME
Refills: 0 | Status: DISCONTINUED | OUTPATIENT
Start: 2025-05-06 | End: 2025-05-06

## 2025-05-06 RX ORDER — ASPIRIN 325 MG
81 TABLET ORAL DAILY
Refills: 0 | Status: DISCONTINUED | OUTPATIENT
Start: 2025-05-06 | End: 2025-05-07

## 2025-05-06 RX ORDER — ONDANSETRON HCL/PF 4 MG/2 ML
4 VIAL (ML) INJECTION EVERY 8 HOURS
Refills: 0 | Status: DISCONTINUED | OUTPATIENT
Start: 2025-05-06 | End: 2025-05-07

## 2025-05-06 RX ORDER — CLOPIDOGREL BISULFATE 75 MG/1
75 TABLET, FILM COATED ORAL DAILY
Refills: 0 | Status: DISCONTINUED | OUTPATIENT
Start: 2025-05-07 | End: 2025-05-07

## 2025-05-06 RX ORDER — ATORVASTATIN CALCIUM 80 MG/1
80 TABLET, FILM COATED ORAL ONCE
Refills: 0 | Status: DISCONTINUED | OUTPATIENT
Start: 2025-05-06 | End: 2025-05-07

## 2025-05-06 RX ORDER — ATORVASTATIN CALCIUM 80 MG/1
80 TABLET, FILM COATED ORAL AT BEDTIME
Refills: 0 | Status: DISCONTINUED | OUTPATIENT
Start: 2025-05-07 | End: 2025-05-07

## 2025-05-06 RX ORDER — ANASTROZOLE 1 MG/1
1 TABLET ORAL DAILY
Refills: 0 | Status: DISCONTINUED | OUTPATIENT
Start: 2025-05-06 | End: 2025-05-07

## 2025-05-06 RX ORDER — MECLIZINE HCL 12.5 MG
12.5 TABLET ORAL EVERY 6 HOURS
Refills: 0 | Status: DISCONTINUED | OUTPATIENT
Start: 2025-05-06 | End: 2025-05-07

## 2025-05-06 RX ORDER — SPIRONOLACTONE 25 MG
1 TABLET ORAL
Refills: 0 | DISCHARGE

## 2025-05-06 RX ADMIN — Medication 12.5 MILLIGRAM(S): at 22:57

## 2025-05-06 RX ADMIN — ANASTROZOLE 1 MILLIGRAM(S): 1 TABLET ORAL at 21:31

## 2025-05-06 RX ADMIN — CLOPIDOGREL BISULFATE 300 MILLIGRAM(S): 75 TABLET, FILM COATED ORAL at 13:41

## 2025-05-06 NOTE — ED PROVIDER NOTE - PROGRESS NOTE DETAILS
eun: EKG my interpretation sinus rhythm 87 QTc 433 no ST elevations or depressions does have Q's in 3 and aVF

## 2025-05-06 NOTE — STROKE CODE NOTE - NS SC SS NIH ATYP SYMPTOMS_GEN_A_CORE
Dizziness/Vertigo/Difficulty walking/Hand or foot weakness without proximal limb involvement/Numbness and tingling

## 2025-05-06 NOTE — PATIENT PROFILE ADULT - FALL HARM RISK - HARM RISK INTERVENTIONS
Assistance with ambulation/Assistance OOB with selected safe patient handling equipment/Communicate Risk of Fall with Harm to all staff/Monitor for mental status changes/Monitor gait and stability/Move patient closer to nurses' station/Reinforce activity limits and safety measures with patient and family/Reorient to person, place and time as needed/Review medications for side effects contributing to fall risk/Sit up slowly, dangle for a short time, stand at bedside before walking/Tailored Fall Risk Interventions/Visual Cue: Yellow wristband and red socks/Bed in lowest position, wheels locked, appropriate side rails in place/Call bell, personal items and telephone in reach/Instruct patient to call for assistance before getting out of bed or chair/Non-slip footwear when patient is out of bed/[place] to call system/Physically safe environment - no spills, clutter or unnecessary equipment/Purposeful Proactive Rounding/Room/bathroom lighting operational, light cord in reach

## 2025-05-06 NOTE — H&P ADULT - ASSESSMENT
70F PMHx significant for hypertension, hyperlipidemia, CAD s/p stent (most recent 2022 on aspirin), vertigo who presents with worsening dizziness suspect in setting of positional vertigo admitted to rule out acute stroke

## 2025-05-06 NOTE — STROKE CODE NOTE - NIH STROKE SCALE: 1A. LEVEL OF CONSCIOUSNESS, QM
ED General





- General


Chief Complaint: Shortness Of Breath


Stated Complaint: DIFFICULTY BREATHING


Time Seen by Provider: 07/07/17 19:20


Mode of Arrival: Ambulatory


Notes: 


Patient is a 72-year-old male with a past medical history of hypertension, 

hypercholesterolemia, chronic kidney disease who presents with 4-5 days of 

progressively worsening orthopnea, dyspnea on exertion and peripheral edema.  

Denies a history of similar symptoms in the past.  He has not seen his primary 

care doctor regarding today's concerns.  States he came to the emergency 

department today due to his inability to see his primary care doctor.  He 

denies any associated chest pain, vomiting or pain radiating into the arms, jaw 

or back.  States that his shortness of breath is most severe when lying flat 

and this is actually prevented him from sleeping over the past several days.  

States in general his symptoms do improve after he sits up.  Denies any fever 

or constitutional symptoms.


TRAVEL OUTSIDE OF THE [place] IN LAST 30 DAYS: No





- Related Data


Allergies/Adverse Reactions: 


 





No Known Allergies Allergy (Verified 07/07/17 18:57)


 











Past Medical History





- General


Information source: Patient





- Social History


Smoking Status: Never Smoker


Chew tobacco use (# tins/day): No


Frequency of alcohol use: None


Drug Abuse: None


Lives with: Spouse/Significant other


Family History: Reviewed & Not Pertinent


Patient has suicidal ideation: No


Patient has homicidal ideation: No





- Past Medical History


Cardiac Medical History: Reports: Hx Hypertension


Renal/ Medical History: Denies: Hx Peritoneal Dialysis


Past Surgical History: Reports: Hx Orthopedic Surgery - lt knee replacement, Hx 

Tonsillectomy





- Immunizations


Hx Diphtheria, Pertussis, Tetanus Vaccination: Yes





Review of Systems





- Review of Systems


Notes: 


Constitutional: Negative for fever.


HENT: Negative for sore throat.


Eyes: Negative for visual changes.


Cardiovascular: Negative for chest pain.


Respiratory: Positive for shortness of breath.


Gastrointestinal: Negative for abdominal pain, vomiting or diarrhea.


Genitourinary: Negative for dysuria.


Musculoskeletal: Positive for bilateral lower extremity edema


Skin: Negative for rash.


Neurological: Negative for headaches, weakness or numbness.





10 point ROS negative except as marked above and in HPI.





Physical Exam





- Vital signs


Vitals: 


 











Temp Pulse Resp BP Pulse Ox


 


 98.2 F   75   20   162/82 H  96 


 


 07/07/17 18:57  07/07/17 18:57  07/07/17 18:57  07/07/17 18:57  07/07/17 18:57











Interpretation: Hypertensive


Notes: 


PHYSICAL EXAMINATION:





GENERAL: Well-appearing, well-nourished and in no acute distress.





HEAD: Atraumatic, normocephalic.





EYES: Pupils equal round and reactive to light, extraocular movements intact, 

sclera anicteric, conjunctiva are normal.





ENT: nares patent, oropharynx clear without exudates.  Moist mucous membranes.





NECK: Normal range of motion, supple without lymphadenopathy





LUNGS: Breath sounds are diminished at the bases bilaterally, more prominent on 

the right.  No tachypnea or distress





HEART: Regular rate and rhythm without murmurs





ABDOMEN: Soft, nontender, normoactive bowel sounds.  No guarding, no rebound.  

No masses appreciated.





EXTREMITIES: Normal range of motion, 4+ pitting edema in the bilateral lower 

extremities





NEUROLOGICAL: No focal neurological deficits. Moves all extremities 

spontaneously and on command.





PSYCH: Normal mood, normal affect.





SKIN: Warm, Dry, normal turgor, no rashes or lesions noted.





Course





- Re-evaluation


Re-evalutation: 


07/07/17 19:55


Patient presents with 3 days of progressively worsening orthopnea, lower 

extremity edema, and generalized fatigue.  Initial EKG is consistent with a 

second-degree block although there are several components of the EKG that are 

somewhat worrisome for a third-degree block..  I contacted cardiologist to 

review the EKG to confirm as we do not have electrophysiology at this facility 

and this would warrant transfer.  Patient however is actually non-toxic in 

appearance, awake and talking to me without any difficulty.  His heart rate 

does range anywhere between the mid 30s to upper 50s which again would be 

somewhat inconsistent with a third-degree block.  Primary concern at this time 

is that patient may have a degree of congestive heart failure versus 

progressively worsening chronic kidney disease with associated pulmonary edema.

  Will obtain labs, chest x-ray, continue on cardiac monitor, placed pads on 

patient's chest and continually reassess frequently.








07/07/17 20:19


I discussed this case with Dr. Suarez who feels that this is more likely a 

second-degree block and the patient does not require transfer to a place of 

electrophysiology.  Patient remains hemodynamically stable.





07/07/17 21:15


Patient continues to be nontoxic in appearance.  Laboratories demonstrate 

findings consistent with congestive heart failure with an elevated proBNP, and 

chest x-ray does demonstrate pulmonary edema and vascular congestion.  Given 

patient's likely new diagnosis of congestive failure with associated pulmonary 

edema, symptomatic orthopnea, tachypnea on exertion, and a new second-degree 

block that was not present on a prior EKG, patient will be admitted to the 

hospital.














- Vital Signs


Vital signs: 


 











Temp Pulse Resp BP Pulse Ox


 


 99.0 F   45 L  20   153/54 H  95 


 


 07/08/17 00:06  07/08/17 02:00  07/08/17 00:06  07/08/17 00:06  07/08/17 00:06














- Laboratory


Result Diagrams: 


 07/07/17 19:25





 07/07/17 19:25


Laboratory results interpreted by me: 


 











  07/07/17 07/07/17 07/07/17





  19:25 19:25 19:25


 


Hgb  12.8 L  


 


MCH  25.2 L  


 


MCHC  31.1 L  


 


RDW  17.8 H  


 


Chloride   110 H 


 


Carbon Dioxide   19 L 


 


Creatinine   1.47 H 


 


Est GFR ( Amer)   57 L 


 


Est GFR (Non-Af Amer)   47 L 


 


Glucose   128 H 


 


NT-Pro-B Natriuret Pep    2650 H














- Diagnostic Test


Radiology reviewed: Image reviewed, Reports reviewed


Radiology results interpreted by me: 





07/07/17 21:30


Chest x-ray: Vascular congestion, pulmonary edema more prominent on the right 

lower lobe





- EKG Interpretation by Me


Additional EKG results interpreted by me: 





07/08/17 03:00


Bradycardia rate 39.  Second-degree AV block Mobitz type I.  PACs present.  No 

ST elevations are present





Discharge





- Discharge


Clinical Impression: 


 Orthopnea, Second degree AV block, Mobitz type I





Pulmonary edema


Qualifiers:


 Chronicity: acute Qualified Code(s): J81.0 - Acute pulmonary edema





Condition: Fair


Disposition: ADMITTED AS INPATIENT


Admitting Provider: [facility]


Unit Admitted: [facility] (0) Alert; keenly responsive

## 2025-05-06 NOTE — H&P ADULT - PROBLEM SELECTOR PLAN 2
home meds: Enalipril, Amlodipine, Spironolactone     - hold home meds for permissive hypertension while ruling out stroke

## 2025-05-06 NOTE — ED ADULT NURSE NOTE - CHPI ED NUR SYMPTOMS NEG
no blurred vision/no change in level of consciousness/no confusion/no fever/no loss of consciousness/no nausea

## 2025-05-06 NOTE — ED PROVIDER NOTE - PHYSICAL EXAMINATION
GENERAL: well appearing in no acute distress, non-toxic appearing  HEAD: normocephalic, atraumatic  CARDIAC: regular rate and rhythm, normal S1S2, no appreciable murmurs, 2+ pulses in UE/LE b/l  PULM: normal breath sounds, clear to ascultation bilaterally, no rales, rhonchi, wheezing  GI: abdomen nondistended, soft, nontender, no guarding, rebound tenderness  : no CVA tenderness b/l, no suprapubic tenderness  NEURO: Moves all extremities spontaneously. 4/5  strength b/l mild pronator drift on LUE,abnormal finger to nose on R   patient can straight leg raise bilaterally and resist symmetrically, symmetric smile, EOM intact b/l  MSK: no peripheral edema, no calf tenderness b/l

## 2025-05-06 NOTE — ED PROVIDER NOTE - CLINICAL SUMMARY MEDICAL DECISION MAKING FREE TEXT BOX
HPI Objective Statement: 70y f hx breast cancer cad s/p stents 2019 vertigo on meclizine, presents to ed as ode stroke for numbness/tingling down left arm around 1220 at physical therapy. pt was having dizziness at 8 am when she woe up and took meclizine, then at PT when she went from seated to lying flat felt dizzy and then had neck pain and L hand tingling, denies any weakness. /90 HR 64 afebrile saturating well on ra, pt has pronator drift and weak  strength on L and abnormal finger to nose on R, no other neuro deficits, think likely this was BPPV in setting of positional change, potential vestibular neuritis given congestion sx, and think arm tingling could be cervical radiculopathy for which she is getting pt for, will r/o LVO or central vertigo, pending ct blood work and neurology  re will likely keep pt for MRI given severity of sx.

## 2025-05-06 NOTE — CONSULT NOTE ADULT - ASSESSMENT
DIETER GUILLORY is a right handed 70y (1955) female with a PMHx significant for hypertension, hyperlipidemia, CAD s/p stent (most recent 2022 on aspirin), vertigo who presents with dizziness. Patient was in her usual state of health until this morning 5/6 8am. She woke up with mild dizziness and took a meclizine. She went to [facility] physical therapy, where during a head tilt maneuver she became acutely vertiginous. Her last known well was 1015 am and symptom onset was 1025 am. EMS was alerted and at approximately 1055am pt had complained of left hand numbess. Dizziness is worse with movement, and patient states symptoms are similar to her previous episodes of vertigo that required admission in March 2025.  Denies any vision disturbance, lateropulsion, but states she has left sided numbness. She reports feeling sensation of fullness in her face and ear but denies any ear pain, hearing loss or tinnitus. No falls or head strike. She takes aspirin 81mg daily at home. CTH and CTA in ED were unrevealing for acute infarct or significant posterior circulation stenosis.     NIHSS 1 (Left hemisensory deficit)  MRS 0  LKW 5/6 1015      Impression  Episode of vertigo improving with meclizine and left hemisensory deficit likely localizing to acute right sided brain dysfunction and concerning for acute ischemic stroke. Mechanism unknown pending further imaging and stroke workup.    Recommendations  [] Telemetry monitoring  [] Dysphagia screen  [] SBP goal <180mmHg  [] Aspirin 81mg and Plavix 300mg Load, followed by DAPT with aspirin 81mg and Plavix 75mg for 21 days  [] Atorvastatin 80mg (goal LDL <70 or titrate per ASCVD 10-year risk if mechanism of stroke is non-atherosclerotic)  [] A1c, lipid panel  [] MRI brain w/o contrast  [] TTE without bubble study   [] Neurochecks, vitals q4h  [] Fall and aspiration precautions  [] PT/OT/SLP/CM  [] Diet: Pending bedside swallow  [] Stroke education provided  [] Smoking cessation education not needed - non-smoker  [] Please document MRS on discharge    Patient/plan d/w Stroke fellow, Dr. Ly, under the supervision of attending vascular neurologist Dr. Libman. To be seen by attending in the AM with attestation to follow. Recommendations were relayed directly to ED. Note delayed d/t emergent patient care.   DIETER GUILLORY is a right handed 70y (1955) female with a PMHx significant for hypertension, hyperlipidemia, CAD s/p stent (most recent 2022 on aspirin), vertigo who presents with dizziness. Patient was in her usual state of health until this morning 5/6 8am. She woke up with mild dizziness and took a meclizine. She went to [facility] physical therapy, where during a head tilt maneuver she became acutely vertiginous. Her last known well was 1015 am and symptom onset was 1025 am. EMS was alerted and at approximately 1055am pt had complained of left hand numbess. Dizziness is worse with movement, and patient states symptoms are similar to her previous episodes of vertigo that required admission in March 2025.  Denies any vision disturbance, lateropulsion, but states she has left sided numbness. She reports feeling sensation of fullness in her face and ear but denies any ear pain, hearing loss or tinnitus. No falls or head strike. She takes aspirin 81mg daily at home. CTH and CTA in ED were unrevealing for acute infarct or significant posterior circulation stenosis.     NIHSS 1 (Left hemisensory deficit)  MRS 0  LKW 5/6 1015      Impression  Episode of vertigo improving with meclizine and left hemisensory deficit likely localizing to acute right sided brain dysfunction. Likely this is peripheral vertigo vs unlikely acute ischemic stroke.    Recommendations  []normotension   [] Aspirin 81mg  [] Atorvastatin 80mg (goal LDL <70 or titrate per ASCVD 10-year risk if mechanism of stroke is non-atherosclerotic)  [] A1c, lipid panel  [] Please follow with vascular neurology at [address] [place] [address] [phone]    Patient/plan d/w Stroke fellow, Dr. Ly, under the supervision of attending vascular neurologist Dr. Libman. To be seen by attending in the AM with attestation to follow. Recommendations were relayed directly to ED. Note delayed d/t emergent patient care.

## 2025-05-06 NOTE — ED PROVIDER NOTE - OBJECTIVE STATEMENT
70y f hx breast cancer cad s/p stents 2019 vertigo on meclizine, presents to ed as ode stroke for numbness/tingling down left arm around 1220 at physical therapy. pt was having dizziness at 8 am when she woe up and took meclizine, then at PT when she went from seated to lying flat felt dizzy and then had neck pain and L hand tingling, denies any weakness.

## 2025-05-06 NOTE — H&P ADULT - NSHPPHYSICALEXAM_GEN_ALL_CORE
General: NAD  Head: NC/AT; MMM; PERRL; EOMI;  Neck: Supple; no JVD  Respiratory: CTAB; no wheezes/rales/rhonchi  Cardiovascular: Regular rhythm/rate; S1/S2+, no murmurs, rubs gallops   Gastrointestinal: Soft; NTND; bowel sounds normal and present  Extremities: WWP; no edema/cyanosis  Neurological: A&Ox3, CNII-XII grossly intact; weakness in right hand when compared with left. sensation to pinprick intact across all dermatomes of the face. heel to shin intact bilaterally. strength 5/5 upper exts, 4/5 lower exts  Integument: intact; normal turgor, texture, temperature, color for age

## 2025-05-06 NOTE — ED PROVIDER NOTE - ATTENDING CONTRIBUTION TO CARE
eun: 70-year-old woman who comes into the emergency department with unsteady gait and dizziness.  She has a history of breast cancer high lipids coronary artery disease hypertension.  She has stents on aspirin no other anticoagulation.  She also has numbness going down her left arm she went to bed at 1 AM and woke at 8 AM feel slightly dizzy took a meclizine and then at 1220 during physical therapy was so dizzy she could not walk ambulance was called    Blood pressure 180/90 respiratory rate 16 temp 36.7 O2 sat 100 pulse 64  Pt alert and can phonate well  h at/nc  perrl, conj clear, sclera anicteric,  neck supple  abd soft no r/g/t  ext no edema no deformities  neueo awake, lucid moves all extremities although has weakness with dorsiflexion on the left has good solid handgrip both left and right.  On finger-nose has a little past-pointing more on the right than on the left.  Has mild disconjugate gaze with the right eye  psych normal affect      Stroke code called.  Patient CAT scan orders made await neurology input    Upon my evaluation, this patient had a high probability of imminent or life-threatening deterioration due to stroke code_, which required my direct attention, intervention, and personal management.  The patient has a  medical condition that impairs one or more vital organ systems.  Frequent personal assessment and adjustment of medical interventions was performed.      I have personally provided 34_ minutes of critical care time exclusive of time spent on separately billable procedures. Time includes review of laboratory data, radiology results, discussion with consultants, patient and family; monitoring for potential decompensation, as well as time spent retrieving data and reviewing the chart and documenting the visit. Interventions were performed as documented above.

## 2025-05-06 NOTE — PHARMACOTHERAPY INTERVENTION NOTE - COMMENTS
Medication history is incomplete. Unable to verify patient's entire medication list with two sources. Family member/patient reports taking medication but there was no fill history for those listed with a follow up. The other medications on the OMR were verified by [facility] pharmacy.    To Note:  -There was no fill history for Atorvastatin 40mg at [facility] or [facility]  -Last time patient received Amlodipine 10mg was in 11/2023 from [facility]  -As per [facility], there is a fill history for Enalapril 10mg 1 tablet orally once daily picked up 4/1/25 for 90 day supply. But family member has enalapril 20mg on medication list.   -Patient was seen by cardiologist 5/5/2025 and MD discontinued metoprolol er succinate 25mg since her BP went too low. She was informed to take it when her blood pressure is high   -Recently completed course of H. Pylori medications (Amoxicillin, Rifabutin, Omeprazole) from 3/23-4/5/2025    Home Medications:  amLODIPine 10 mg oral tablet: 1 tab(s) orally once a day   anastrozole 1 mg oral tablet: 1 tab(s) orally once a day   aspirin 81 mg oral tablet: 1 tab(s) orally once a day   atorvastatin 40 mg oral tablet: 1 tab(s) orally once a day   enalapril 20 mg oral tablet: 1 tab(s) orally once a day   spironolactone 25 mg oral tablet: 1 tab(s) orally once a day   meclizine 12.5 mg oral tablet: 1 tab(s) orally every 6 hours as needed for  dizziness   ondansetron 4 mg oral tablet: 1 tab(s) orally every 8 hours as needed for  dizziness       Please call spectra [phone] if you have any questions.

## 2025-05-06 NOTE — ED ADULT NURSE NOTE - CHIEF COMPLAINT QUOTE
Patient coming from physical therapy c/o dizziness, patient also c/o numbness to L arm. Reports she went to bed at 0100 but then woke up at 0800 feeling slightly dizzy, took a meclizine and then at 1020 during physical therapy was so dizzy she could not walk. No other neuro deficits. Phx HTN, HLD, CAD, stents on aspirin, breast cancer. Code stroke called per MD Aden

## 2025-05-06 NOTE — ED ADULT TRIAGE NOTE - CHIEF COMPLAINT QUOTE
Patient coming from physical therapy c/o dizziness, patient also c/o numbness to L arm. Reports she went to bed at 0100 but then woke up at 0800 feeling slightly dizzy, took a meclizine and then at 1020 during physical therapy was so dizzy she could not walk. No other neuro deficits. Phx CAD, stents on aspirin, breast cancer. Code stroke called per MD Aden Patient coming from physical therapy c/o dizziness, patient also c/o numbness to L arm. Reports she went to bed at 0100 but then woke up at 0800 feeling slightly dizzy, took a meclizine and then at 1020 during physical therapy was so dizzy she could not walk. No other neuro deficits. Phx HTN, HLD, CAD, stents on aspirin, breast cancer. Code stroke called per MD Aden

## 2025-05-06 NOTE — CONSULT NOTE ADULT - SUBJECTIVE AND OBJECTIVE BOX
Neurology - Consult Note    -  Spectra: [phone] ([facility]), [phone] ([facility])  -    HPI:   DIETER GUILLORY is a right handed 70y (1955) female with a PMHx significant for hypertension, hyperlipidemia, CAD s/p stent (most recent 2022 on aspirin), vertigo who presents with dizziness. Patient was in her usual state of health until this morning 5/6 8am. She woke up with mild dizziness and took a meclizine. She went to [facility] physical therapy, where during a head tilt maneuver she became acutely vertiginous. Her last known well was 1015 am and symptom onset was 1025 am. EMS was alerted and at approximately 1055am pt had complained of left hand numbess. Dizziness is worse with movement, and patient states symptoms are similar to her previous episodes of vertigo that required admission in March 2025.  Denies any vision disturbance, lateropulsion, but states she has left sided numbness. She reports feeling sensation of fullness in her face and ear but denies any ear pain, hearing loss or tinnitus. No falls or head strike. She takes aspirin 81mg daily at home. CTH and CTA in ED were unrevealing for acute infarct or significant posterior circulation stenosis.     NIHSS 1 (Left hemisensory deficit)  MRS 0  LKW 5/6 1015    Not a candidate for thrombectomy due to no LVO on vessel imaging.     Risks, benefits, and adverse reactions associated with IV tenecteplase including hemorrhagic stroke were discussed with patient and family members in detail. Patient and family members refused IV tenecteplase.     Review of Systems:    CONSTITUTIONAL: No fevers or chills  EYES AND ENT: No visual changes or no throat pain   NECK: mild midline neck pain   RESPIRATORY: No hemoptysis or shortness of breath  CARDIOVASCULAR: No chest pain or palpitations  GASTROINTESTINAL: No melena or hematochezia  GENITOURINARY: No dysuria or hematuria  NEUROLOGICAL: +As stated in HPI above  SKIN: No itching, burning, rashes, or lesions   All other review of systems is negative unless indicated above.    Allergies:  Dilaudid (Other)  adhesives (Rash)  morphine (Pruritus)      PMHx/PSHx/Family Hx: As above, otherwise see below   Hyperlipemia    Hypertension    Arthritis    Breast cancer    CAD (coronary artery disease)        Social Hx:  No current use of tobacco, alcohol, or illicit drugs  Lives with family     Medications:  MEDICATIONS  (STANDING):  anastrozole 1 milliGRAM(s) Oral daily  aspirin  chewable 81 milliGRAM(s) Oral daily  atorvastatin 80 milliGRAM(s) Oral once    MEDICATIONS  (PRN):  meclizine 12.5 milliGRAM(s) Oral every 6 hours PRN for dizziness  ondansetron    Tablet 4 milliGRAM(s) Oral every 8 hours PRN for dizziness      Vitals:  T(C): 36.9 (05-06-25 @ 14:00), Max: 36.9 (05-06-25 @ 14:00)  HR: 61 (05-06-25 @ 14:00) (61 - 98)  BP: 112/64 (05-06-25 @ 14:00) (112/64 - 180/90)  RR: 16 (05-06-25 @ 14:00) (16 - 18)  SpO2: 100% (05-06-25 @ 14:00) (97% - 100%)    Physical Examination:     Constitutional: well-developed, well-nourished, well-groomed  Cardiovascular: no swelling, warm-to-touch    Neurological Examination:    - Mental Status: Eyes open, attends to examiner; oriented to person, age, month, year, location, and situation; speech is fluent with intact naming, intact repetition, and follows 1-step and 3-step mid-line crossing commands;     - Cranial Nerves:  II: Visual fields are full to confrontation; pupils are equal, round, and reactive to light   III, IV, VI: Extraocular movements are intact without nystagmus  V: Facial sensation sensation diminished on the left v2-v3 distribution  VII: Face is symmetric with normal eye closure and smile  VIII: Hearing is intact to conversation  IX, X: Uvula is midline and soft palate rises symmetrically  XI: Shoulder shrug intact  XII: Tongue protrudes in the midline    - Motor/Strength Testing:  - No drifts x 4 extremities.                                   Right           Left  Deltoid                     5                 5  Biceps                      5                 5  Triceps                     5                5  Wrist Ext (radial)       5                 4+  Hand                  4                 4(However this is baseline)    Hip Flex                   5                  5  Knee Ext	      5                  5  Dorsiflex                  5                  5  Plantarflex               5                  5    - Normal muscle bulk and tone throughout.    - Sensory: Decreased sensation on left side of body to pinprick and light touch   - Coordination: Finger-nose-finger intact without ataxia or dysmetria.    - Gait: Normal steps however unsteady due to vertigo requiring assistance       Labs:                        12.9   6.48  )-----------( 213      ( 06 May 2025 12:21 )             39.0     05-06    138  |  103  |  10  ----------------------------<  143[H]  4.9   |  18[L]  |  0.58    Ca    9.1      06 May 2025 12:21    TPro  7.5  /  Alb  4.2  /  TBili  0.3  /  DBili  x   /  AST  31  /  ALT  31  /  AlkPhos  100  05-06    CAPILLARY BLOOD GLUCOSE  152 (06 May 2025 12:12)      POCT Blood Glucose.: 152 mg/dL (06 May 2025 11:40)    LIVER FUNCTIONS - ( 06 May 2025 12:21 )  Alb: 4.2 g/dL / Pro: 7.5 g/dL / ALK PHOS: 100 U/L / ALT: 31 U/L / AST: 31 U/L / GGT: x             PT/INR - ( 06 May 2025 12:21 )   PT: 10.3 sec;   INR: <0.90 ratio         PTT - ( 06 May 2025 12:21 )  PTT:27.0 sec  CSF:                  Radiology:    CT Brain:    Impression:  Unremarkable noncontrast head CT.      CTA Brain   IMPRESSION: Left carotid stenosis is seen    Desiccation is seen involving both distal internal carotid arteries.    Unremarkable CTA of the [place].    CT perfusion data as described above.

## 2025-05-06 NOTE — H&P ADULT - TIME BILLING
Time-based billing (NON-critical care).     more than 50% of the visit was spent counseling and / or coordinating care by the attending physician.  The necessity of the time spent during the encounter on this date of service was due to:     review of laboratory data, radiology results, consultants' recommendations, documentation in [place], discussion with patient/ACP and interdisciplinary staff (such as , social workers, etc). Interventions were performed as documented above.

## 2025-05-06 NOTE — CONSULT NOTE ADULT - TIME BILLING
70-year-old ? handed lady evaluated at [facility] on 5/7/2025 with vertigo.  History and exam as above, with minor changes.  ROS otherwise negative.  Exam.  Alert and attentive; power 5/5 throughout (although I did not test the intrinsic hand muscles); sensory-intact light touch and temperature on face, arm and leg (I did not test the individual fingers); left-sided Tinel's sign positive; gait not tested; remainder of neurologic exam was nonfocal.  CT head (5/6/25) to my eye was unremarkable.  CTP (5/6/2025) was normal.  CTA neck (5/6/2025) to my eye showed moderate calcified plaque at the left carotid bifurcation, with stenosis officially measured at "66%"; a tiny calcified plaque at the origin of the right vertebral artery; a tiny calcified plaque at the left V3-V4 junction.  CTA head (5/6/2025) to my eye showed a tiny calcified plaque involving the right vertebral artery.    Impression.  In 3/25 she presented with positional vertigo and neuroimaging including MRI was negative.  On and off she has had left hand paresthesias and pain, especially at night.  She now presents with identical symptoms of positional vertigo; at some point, she also noted her usual left hand paresthesias.  She therefore essentially has an acute vestibular syndrome, most likely benign positional vertigo.  Central pathology such as posterior circulation infarction is highly unlikely.  Her left hand sensory symptoms are probably due to carpal tunnel syndrome.  She also appears to have moderate or moderate-severe, asymptomatic extracranial left carotid stenosis (measured at 66%).  Whether or not she will benefit from revascularization is controversial, and this can be further addressed when she follows up with the stroke service as an outpatient.    Suggest.  Elective MRI brain can be done as inpatient or outpatient and should not delay discharge; for now, Plavix 300 mg loading dose, then 75 mg daily for 3 weeks; continue home aspirin 81 mg daily; if MRI brain shows no recent infarct, then Plavix can be stopped (this can be arranged as an outpatient); atorvastatin 80 mg daily, target LDL less than 55 (mostly for coronary artery disease); PT/OT; she has an appointment in 6/25 with a neuro otologist; from the neurovascular standpoint, cleared for discharge.

## 2025-05-06 NOTE — H&P ADULT - NSHPLABSRESULTS_GEN_ALL_CORE
LABS:                          11.9   5.47  )-----------( 208      ( 07 May 2025 05:17 )             36.5     05-07    143  |  106  |  10  ----------------------------<  125[H]  4.0   |  22  |  0.60    Ca    9.4      07 May 2025 05:17  Phos  4.9     05-07  Mg     1.90     05-07    TPro  7.5  /  Alb  4.2  /  TBili  0.3  /  DBili  x   /  AST  31  /  ALT  31  /  AlkPhos  100  05-06    PT/INR - ( 06 May 2025 12:21 )   PT: 10.3 sec;   INR: <0.90 ratio         PTT - ( 06 May 2025 12:21 )  PTT:27.0 sec      < from: CT Brain Perfusion Maps Stroke (05.06.25 @ 12:28) >    IMPRESSION: Left carotid stenosis is seen    Desiccation is seen involving both distal internal carotid arteries.    Unremarkable CTA of the [place].    CT perfusion data as described above.    < end of copied text >

## 2025-05-06 NOTE — H&P ADULT - PROBLEM SELECTOR PLAN 5
F: oral intake  E: replete K>4, Mg >2  N: DASH Diet  GI: none  DVT: Lovenox 40mg    Code Status: Full Code    Dispo: Medical Floor with telemetry

## 2025-05-06 NOTE — H&P ADULT - PROBLEM SELECTOR PLAN 1
patient presenting with acute worsening of vertigo during physical therapy while being placed backwards onto a table.   physical exam unconvincing of stroke. cerebellar tests all intact including finger to finger, finger to nose, heel to shin, (though rapid alternating movement limited by shoulder weakness). no focal neurological deficits. sensation intact, motor strength largely intact (mild bilateral weakness in hands) , recall intact, cognitive executive function intact  dysphagia screen passed    - PT for Epley Maneuver and vestibular therapy  - MR brain to rule out stroke definitively  - MR neck for pain in neck with mild distal weakness in bilateral hands  - stroke protocol ( asa load, plavix load, statin, lovenox)  - permissive hypertension  - f/u neurology recs  - TTE w/ bubble  - Telemetry  - c/w meclizine PRN

## 2025-05-07 ENCOUNTER — RESULT REVIEW (OUTPATIENT)
Age: 70
End: 2025-05-07

## 2025-05-07 ENCOUNTER — TRANSCRIPTION ENCOUNTER (OUTPATIENT)
Age: 70
End: 2025-05-07

## 2025-05-07 VITALS
HEART RATE: 63 BPM | RESPIRATION RATE: 18 BRPM | OXYGEN SATURATION: 100 % | SYSTOLIC BLOOD PRESSURE: 140 MMHG | TEMPERATURE: 98 F | DIASTOLIC BLOOD PRESSURE: 53 MMHG

## 2025-05-07 DIAGNOSIS — I25.10 ATHEROSCLEROTIC HEART DISEASE OF NATIVE CORONARY ARTERY WITHOUT ANGINA PECTORIS: ICD-10-CM

## 2025-05-07 DIAGNOSIS — Z85.3 PERSONAL HISTORY OF MALIGNANT NEOPLASM OF BREAST: ICD-10-CM

## 2025-05-07 DIAGNOSIS — R42 DIZZINESS AND GIDDINESS: ICD-10-CM

## 2025-05-07 DIAGNOSIS — Z29.9 ENCOUNTER FOR PROPHYLACTIC MEASURES, UNSPECIFIED: ICD-10-CM

## 2025-05-07 DIAGNOSIS — I10 ESSENTIAL (PRIMARY) HYPERTENSION: ICD-10-CM

## 2025-05-07 LAB
A1C WITH ESTIMATED AVERAGE GLUCOSE RESULT: 6.2 % — HIGH (ref 4–5.6)
ANION GAP SERPL CALC-SCNC: 15 MMOL/L — HIGH (ref 7–14)
BUN SERPL-MCNC: 10 MG/DL — SIGNIFICANT CHANGE UP (ref 7–23)
CALCIUM SERPL-MCNC: 9.4 MG/DL — SIGNIFICANT CHANGE UP (ref 8.4–10.5)
CHLORIDE SERPL-SCNC: 106 MMOL/L — SIGNIFICANT CHANGE UP (ref 98–107)
CHOLEST SERPL-MCNC: 219 MG/DL — HIGH
CO2 SERPL-SCNC: 22 MMOL/L — SIGNIFICANT CHANGE UP (ref 22–31)
CREAT SERPL-MCNC: 0.6 MG/DL — SIGNIFICANT CHANGE UP (ref 0.5–1.3)
EGFR: 96 ML/MIN/1.73M2 — SIGNIFICANT CHANGE UP
EGFR: 96 ML/MIN/1.73M2 — SIGNIFICANT CHANGE UP
ESTIMATED AVERAGE GLUCOSE: 131 — SIGNIFICANT CHANGE UP
GLUCOSE SERPL-MCNC: 125 MG/DL — HIGH (ref 70–99)
HCT VFR BLD CALC: 36.5 % — SIGNIFICANT CHANGE UP (ref 34.5–45)
HDLC SERPL-MCNC: 52 MG/DL — SIGNIFICANT CHANGE UP
HGB BLD-MCNC: 11.9 G/DL — SIGNIFICANT CHANGE UP (ref 11.5–15.5)
LDLC SERPL-MCNC: 130 MG/DL — HIGH
LIPID PNL WITH DIRECT LDL SERPL: 130 MG/DL — HIGH
MAGNESIUM SERPL-MCNC: 1.9 MG/DL — SIGNIFICANT CHANGE UP (ref 1.6–2.6)
MCHC RBC-ENTMCNC: 30.3 PG — SIGNIFICANT CHANGE UP (ref 27–34)
MCHC RBC-ENTMCNC: 32.6 G/DL — SIGNIFICANT CHANGE UP (ref 32–36)
MCV RBC AUTO: 92.9 FL — SIGNIFICANT CHANGE UP (ref 80–100)
NONHDLC SERPL-MCNC: 167 MG/DL — HIGH
NRBC # BLD AUTO: 0 K/UL — SIGNIFICANT CHANGE UP (ref 0–0)
NRBC # FLD: 0 K/UL — SIGNIFICANT CHANGE UP (ref 0–0)
NRBC BLD AUTO-RTO: 0 /100 WBCS — SIGNIFICANT CHANGE UP (ref 0–0)
PHOSPHATE SERPL-MCNC: 4.9 MG/DL — HIGH (ref 2.5–4.5)
PLATELET # BLD AUTO: 208 K/UL — SIGNIFICANT CHANGE UP (ref 150–400)
POTASSIUM SERPL-MCNC: 4 MMOL/L — SIGNIFICANT CHANGE UP (ref 3.5–5.3)
POTASSIUM SERPL-SCNC: 4 MMOL/L — SIGNIFICANT CHANGE UP (ref 3.5–5.3)
RBC # BLD: 3.93 M/UL — SIGNIFICANT CHANGE UP (ref 3.8–5.2)
RBC # FLD: 13.1 % — SIGNIFICANT CHANGE UP (ref 10.3–14.5)
SODIUM SERPL-SCNC: 143 MMOL/L — SIGNIFICANT CHANGE UP (ref 135–145)
TRIGL SERPL-MCNC: 210 MG/DL — HIGH
WBC # BLD: 5.47 K/UL — SIGNIFICANT CHANGE UP (ref 3.8–10.5)
WBC # FLD AUTO: 5.47 K/UL — SIGNIFICANT CHANGE UP (ref 3.8–10.5)

## 2025-05-07 PROCEDURE — 93306 TTE W/DOPPLER COMPLETE: CPT | Mod: 26

## 2025-05-07 PROCEDURE — 99222 1ST HOSP IP/OBS MODERATE 55: CPT

## 2025-05-07 PROCEDURE — 99223 1ST HOSP IP/OBS HIGH 75: CPT

## 2025-05-07 PROCEDURE — 99233 SBSQ HOSP IP/OBS HIGH 50: CPT

## 2025-05-07 RX ORDER — ENOXAPARIN SODIUM 100 MG/ML
40 INJECTION SUBCUTANEOUS EVERY 24 HOURS
Refills: 0 | Status: DISCONTINUED | OUTPATIENT
Start: 2025-05-07 | End: 2025-05-07

## 2025-05-07 RX ORDER — MECLIZINE HCL 12.5 MG
1 TABLET ORAL
Qty: 21 | Refills: 0
Start: 2025-05-07 | End: 2025-05-13

## 2025-05-07 RX ADMIN — Medication 12.5 MILLIGRAM(S): at 09:53

## 2025-05-07 RX ADMIN — Medication 81 MILLIGRAM(S): at 13:04

## 2025-05-07 RX ADMIN — CLOPIDOGREL BISULFATE 75 MILLIGRAM(S): 75 TABLET, FILM COATED ORAL at 13:08

## 2025-05-07 RX ADMIN — ANASTROZOLE 1 MILLIGRAM(S): 1 TABLET ORAL at 13:08

## 2025-05-07 RX ADMIN — ENOXAPARIN SODIUM 40 MILLIGRAM(S): 100 INJECTION SUBCUTANEOUS at 09:53

## 2025-05-07 NOTE — OCCUPATIONAL THERAPY INITIAL EVALUATION ADULT - NSOTDISCHREC_GEN_A_CORE
Unable to make formal discharge recommendation at this time due to limited functional participation secondary to patient with complaints of dizziness. OT to continue to follow.

## 2025-05-07 NOTE — DISCHARGE NOTE PROVIDER - NSDCCPTREATMENT_GEN_ALL_CORE_FT
PRINCIPAL PROCEDURE  Procedure: CT head wo con  Findings and Treatment: Findings:  The lateral ventricles have a normal configuration.  There is no evidence of acute hemorrhage, mass or mass-effect in the   posterior fossa or in the supratentorial region.  Evaluation of the osseous structures with the appropriate window appears   unremarkable.  The visualized paranasal sinuses mastoid and middle ear regions appear   clear.  Impression:  Unremarkable noncontrast head CT.

## 2025-05-07 NOTE — OCCUPATIONAL THERAPY INITIAL EVALUATION ADULT - PERTINENT HX OF CURRENT PROBLEM, REHAB EVAL
70 year old female with a PMHx significant for hypertension, hyperlipidemia, CAD s/p stent (most recent 2022 on aspirin), vertigo who presents with dizziness. Patient was in her usual state of health until this morning 5/6 8am. She woke up with mild dizziness and took a meclizine. She went to [facility] physical therapy, where during a head tilt maneuver she became acutely vertiginous. Her last known well was 1015 am and symptom onset was 1025 am. EMS was alerted and at approximately 1055am pt had complained of left hand numbness. Dizziness is worse with movement, and patient states symptoms are similar to her previous episodes of vertigo that required admission in March 2025.  Denies any vision disturbance, lateropulsion, but states she has left sided numbness. She reports feeling sensation of fullness in her face and ear but denies any ear pain, hearing loss or tinnitus. No falls or head strike. CTH and CTA in ED were unrevealing for acute infarct or significant posterior circulation stenosis.   Admit dx: Dizziness and giddiness    MRI Head/C-Spine ordered 70 year old female with a PMHx significant for hypertension, hyperlipidemia, CAD s/p stent (most recent 2022 on aspirin), vertigo who presents with dizziness. Patient was in her usual state of health until this morning 5/6 8am. She woke up with mild dizziness and took a meclizine. She went to [facility] physical therapy, where during a head tilt maneuver she became acutely vertiginous. Her last known well was 1015 am and symptom onset was 1025 am. EMS was alerted and at approximately 1055am pt had complained of left hand numbness. Dizziness is worse with movement, and patient states symptoms are similar to her previous episodes of vertigo that required admission in March 2025.  Denies any vision disturbance, lateropulsion, but states she has left sided numbness. She reports feeling sensation of fullness in her face and ear but denies any ear pain, hearing loss or tinnitus. No falls or head strike. CTH and CTA in ED were unrevealing for acute infarct or significant posterior circulation stenosis.   Admit dx: Dizziness and giddiness, code stroke    MRI Head/C-Spine ordered

## 2025-05-07 NOTE — PHYSICAL THERAPY INITIAL EVALUATION ADULT - ACTIVE RANGE OF MOTION EXAMINATION, REHAB EVAL
except R shoulder flexion 0-90 degrees and decreased flexion of distal and proximal IP joints , unable to make a fist/bilateral upper extremity Active ROM was WFL (within functional limits)/bilateral  lower extremity Active ROM was WFL (within functional limits)

## 2025-05-07 NOTE — CONSULT NOTE ADULT - SUBJECTIVE AND OBJECTIVE BOX
Patient is a 70y old  Female who presents with a chief complaint of     HPI:  70F PMHx significant for hypertension, hyperlipidemia, CAD s/p stent (most recent 2022 on aspirin), vertigo who presents with worsening dizziness. She states she was at her physical therapist office undergoing therapy. She was being placed on an examination table and the therapist was reclining the patient back. While in motion backwards, the patient noticed that she became acutely dizzy. She describes it as vertigo that is worse than her baseline. She also noticed worsening of the numbness in her left hand that she states had resolved by the time of this interview. ROS is negative for all other symptoms.         (06 May 2025 15:47)      REVIEW OF SYSTEMS  Constitutional - No fever, No weight loss, No fatigue  HEENT - No eye pain, No visual disturbances, No difficulty hearing, No tinnitus, No vertigo, No neck pain  Respiratory - No cough, No wheezing, No shortness of breath  Cardiovascular - No chest pain, No palpitations  Gastrointestinal - No abdominal pain, No nausea, No vomiting, No diarrhea, No constipation  Genitourinary - No dysuria, No frequency, No hematuria, No incontinence  Neurological - No headaches, No memory loss, No loss of strength, No numbness, No tremors  Skin - No itching, No rashes, No lesions   Endocrine - No temperature intolerance  Musculoskeletal - No joint pain, No joint swelling, No muscle pain  Psychiatric - No depression, No anxiety    PAST MEDICAL & SURGICAL HISTORY  Hyperlipemia    Hypertension    Arthritis    Breast cancer    CAD (coronary artery disease)    History of D&C    S/P breast lumpectomy    S/P total hysterectomy    S/P primary angioplasty with coronary stent        SOCIAL HISTORY  Smoking - Denied  EtOH - Denied   Drugs - Denied    FUNCTIONAL HISTORY  Lives   Independent    CURRENT FUNCTIONAL STATUS      FAMILY HISTORY   FH: HTN (hypertension)    FH: CAD (coronary artery disease)        RECENT LABS/IMAGING  CBC Full  -  ( 07 May 2025 05:17 )  WBC Count : 5.47 K/uL  RBC Count : 3.93 M/uL  Hemoglobin : 11.9 g/dL  Hematocrit : 36.5 %  Platelet Count - Automated : 208 K/uL  Mean Cell Volume : 92.9 fL  Mean Cell Hemoglobin : 30.3 pg  Mean Cell Hemoglobin Concentration : 32.6 g/dL  Auto Neutrophil # : x  Auto Lymphocyte # : x  Auto Monocyte # : x  Auto Eosinophil # : x  Auto Basophil # : x  Auto Neutrophil % : x  Auto Lymphocyte % : x  Auto Monocyte % : x  Auto Eosinophil % : x  Auto Basophil % : x    05-07    143  |  106  |  10  ----------------------------<  125[H]  4.0   |  22  |  0.60    Ca    9.4      07 May 2025 05:17  Phos  4.9     05-07  Mg     1.90     05-07    TPro  7.5  /  Alb  4.2  /  TBili  0.3  /  DBili  x   /  AST  31  /  ALT  31  /  AlkPhos  100  05-06    Urinalysis Basic - ( 07 May 2025 05:17 )    Color: x / Appearance: x / SG: x / pH: x  Gluc: 125 mg/dL / Ketone: x  / Bili: x / Urobili: x   Blood: x / Protein: x / Nitrite: x   Leuk Esterase: x / RBC: x / WBC x   Sq Epi: x / Non Sq Epi: x / Bacteria: x        VITALS  T(C): 36.7 (05-07-25 @ 04:00), Max: 36.9 (05-06-25 @ 14:00)  HR: 58 (05-07-25 @ 04:00) (58 - 98)  BP: 125/56 (05-07-25 @ 04:00) (112/47 - 180/90)  RR: 18 (05-07-25 @ 04:00) (16 - 18)  SpO2: 100% (05-07-25 @ 04:00) (97% - 100%)  Wt(kg): --    ALLERGIES  Dilaudid (Other)  adhesives (Rash)  morphine (Pruritus)      MEDICATIONS   anastrozole 1 milliGRAM(s) Oral daily  aspirin  chewable 81 milliGRAM(s) Oral daily  atorvastatin 80 milliGRAM(s) Oral once  atorvastatin 80 milliGRAM(s) Oral at bedtime  clopidogrel Tablet 75 milliGRAM(s) Oral daily  enoxaparin Injectable 40 milliGRAM(s) SubCutaneous every 24 hours  meclizine 12.5 milliGRAM(s) Oral every 6 hours PRN  ondansetron    Tablet 4 milliGRAM(s) Oral every 8 hours PRN      ----------------------------------------------------------------------------------------  PHYSICAL EXAM  Constitutional - NAD, Comfortable  HEENT - NCAT, EOMI  Neck - Supple, No limited ROM  Chest - no respiratory distress   Cardiovascular - RRR, S1S2   Abdomen -  Soft, NTND  Extremities - No C/C/E, No calf tenderness   Neurologic Exam -                    Cognitive - Awake, Alert, AAO to self, place, date, year, situation     Communication - Fluent, No dysarthria     Cranial Nerves - CN 2-12 intact     Motor - No focal deficits                    LEFT    UE - ShAB 5/5, EF 5/5, EE 5/5, WE 5/5,  5/5                    RIGHT UE - ShAB 5/5, EF 5/5, EE 5/5, WE 5/5,  5/5                    LEFT    LE - HF 5/5, KE 5/5, DF 5/5, PF 5/5                    RIGHT LE - HF 5/5, KE 5/5, DF 5/5, PF 5/5        Sensory - Intact to LT     Reflexes - DTR Intact, No primitive reflexive     Coordination - FTN intact     OculoVestibular - No saccades, No nystagmus, VOR         Balance - WNL Static  Psychiatric - Mood stable, Affect WNL  ----------------------------------------------------------------------------------------  ASSESSMENT/PLAN  71 yo female presented with dizziness  MRI head pending  PT for bed mobility, transfers, ambulation as tolerated  out of bed to chair as tolerated  OT for ADLs  Pain -  DVT PPX -   Rehab - incomplete note, consult in progress    Total time spent to review relevant records and imaging results, examine patient, complete documentation, and when applicable discuss the case with the patient, family, , social workers, and medical team:  55minutes Patient is a 70y old  Female who presents with a chief complaint of     HPI:  70F PMHx significant for hypertension, hyperlipidemia, CAD s/p stent (most recent 2022 on aspirin), vertigo who presents with worsening dizziness. She states she was at her physical therapist office undergoing therapy. She was being placed on an examination table and the therapist was reclining the patient back. While in motion backwards, the patient noticed that she became acutely dizzy. She describes it as vertigo that is worse than her baseline. She also noticed worsening of the numbness in her left hand that she states had resolved by the time of this interview. ROS is negative for all other symptoms.     (06 May 2025 15:47)      REVIEW OF SYSTEMS  Constitutional - No fever, No weight loss, No fatigue  HEENT - No eye pain, No visual disturbances, No difficulty hearing, No tinnitus, + vertigo, No neck pain  Respiratory - No cough, No wheezing, No shortness of breath  Cardiovascular - No chest pain, No palpitations  Gastrointestinal - No abdominal pain, No nausea, No vomiting, No diarrhea, No constipation  Genitourinary - No dysuria, No frequency, No hematuria, No incontinence  Neurological - No headaches, No memory loss, No loss of strength, No numbness, No tremors  Skin - No itching, No rashes, No lesions   Endocrine - No temperature intolerance  Musculoskeletal - No joint pain, No joint swelling, No muscle pain  Psychiatric - No depression, No anxiety    PAST MEDICAL & SURGICAL HISTORY  Hyperlipemia    Hypertension    Arthritis    Breast cancer    CAD (coronary artery disease)    History of D&C    S/P breast lumpectomy    S/P total hysterectomy    S/P primary angioplasty with coronary stent        SOCIAL HISTORY  Smoking - Denied  EtOH - Denied   Drugs - Denied    FUNCTIONAL HISTORY  Lives with  and daughter in home with 4 steps to enter, 16 steps inside to bedroom/bathroom  Independent at baseline    CURRENT FUNCTIONAL STATUS  OT 5/7 Bed Mobility: Rolling/Turning:     · Level of [place]	stand-by assist  · Physical Assist/Nonphysical Assist	verbal cues; nonverbal cues (demo/gestures)    Bed Mobility: Scooting/Bridging:     · Level of [place]	stand-by assist  · Physical Assist/Nonphysical Assist	verbal cues; nonverbal cues (demo/gestures)    Bed Mobility: Sit to Supine:     · Level of [place]	stand-by assist  · Physical Assist/Nonphysical Assist	verbal cues; nonverbal cues (demo/gestures)    Bed Mobility: Supine to Sit:     · Level of [place]	stand-by assist  · Physical Assist/Nonphysical Assist	nonverbal cues (demo/gestures); verbal cues    Bed Mobility Analysis:     · Bed Mobility Limitations	decreased ability to use legs for bridging/pushing; decreased ability to use arms for pushing/pulling  · Impairments Contributing to Impaired Bed Mobility	impaired balance; decreased ROM; decreased strength    Transfer: Sit to Stand:     · Level of [place]	contact guard  · Physical Assist/Nonphysical Assist	verbal cues; nonverbal cues (demo/gestures)  · Weight-Bearing Restrictions	full weight-bearing    Transfer: Stand to Sit:     · Level of [place]	contact guard  · Physical Assist/Nonphysical Assist	verbal cues; nonverbal cues (demo/gestures)  · Weight-Bearing Restrictions	full weight-bearing    Sit/Stand Transfer Safety Analysis:     · Transfer Safety Concerns Noted	decreased balance during turns  · Impairments Contributing to Impaired Transfers	impaired balance; decreased strength        FAMILY HISTORY   FH: HTN (hypertension)    FH: CAD (coronary artery disease)        RECENT LABS/IMAGING  CBC Full  -  ( 07 May 2025 05:17 )  WBC Count : 5.47 K/uL  RBC Count : 3.93 M/uL  Hemoglobin : 11.9 g/dL  Hematocrit : 36.5 %  Platelet Count - Automated : 208 K/uL  Mean Cell Volume : 92.9 fL  Mean Cell Hemoglobin : 30.3 pg  Mean Cell Hemoglobin Concentration : 32.6 g/dL  Auto Neutrophil # : x  Auto Lymphocyte # : x  Auto Monocyte # : x  Auto Eosinophil # : x  Auto Basophil # : x  Auto Neutrophil % : x  Auto Lymphocyte % : x  Auto Monocyte % : x  Auto Eosinophil % : x  Auto Basophil % : x    05-07    143  |  106  |  10  ----------------------------<  125[H]  4.0   |  22  |  0.60    Ca    9.4      07 May 2025 05:17  Phos  4.9     05-07  Mg     1.90     05-07    TPro  7.5  /  Alb  4.2  /  TBili  0.3  /  DBili  x   /  AST  31  /  ALT  31  /  AlkPhos  100  05-06    Urinalysis Basic - ( 07 May 2025 05:17 )    Color: x / Appearance: x / SG: x / pH: x  Gluc: 125 mg/dL / Ketone: x  / Bili: x / Urobili: x   Blood: x / Protein: x / Nitrite: x   Leuk Esterase: x / RBC: x / WBC x   Sq Epi: x / Non Sq Epi: x / Bacteria: x        VITALS  T(C): 36.7 (05-07-25 @ 04:00), Max: 36.9 (05-06-25 @ 14:00)  HR: 58 (05-07-25 @ 04:00) (58 - 98)  BP: 125/56 (05-07-25 @ 04:00) (112/47 - 180/90)  RR: 18 (05-07-25 @ 04:00) (16 - 18)  SpO2: 100% (05-07-25 @ 04:00) (97% - 100%)  Wt(kg): --    ALLERGIES  Dilaudid (Other)  adhesives (Rash)  morphine (Pruritus)      MEDICATIONS   anastrozole 1 milliGRAM(s) Oral daily  aspirin  chewable 81 milliGRAM(s) Oral daily  atorvastatin 80 milliGRAM(s) Oral once  atorvastatin 80 milliGRAM(s) Oral at bedtime  clopidogrel Tablet 75 milliGRAM(s) Oral daily  enoxaparin Injectable 40 milliGRAM(s) SubCutaneous every 24 hours  meclizine 12.5 milliGRAM(s) Oral every 6 hours PRN  ondansetron    Tablet 4 milliGRAM(s) Oral every 8 hours PRN      ----------------------------------------------------------------------------------------  PHYSICAL EXAM  Constitutional - NAD, Comfortable  HEENT - NCAT, EOMI  Chest - no respiratory distress   Cardiovascular - RRR, S1S2   Abdomen -  Soft, NTND  Extremities - No C/C/E, No calf tenderness   Neurologic Exam -                    Cognitive - Awake, Alert, AAO to self, place, date, year, situation     Communication - Fluent, No dysarthria     Cranial Nerves - CN 2-12 intact     Motor - No focal deficits                    LEFT    UE - ShAB 5/5, EF 5/5, EE 5/5, WE 5/5,  5/5                    RIGHT UE - ShAB 4/5, EF 5/5, EE 5/5, WE 5/5,  5/5                    LEFT    LE - HF 5/5, KE 5/5, DF 5/5, PF 5/5                    RIGHT LE - HF 5/5, KE 5/5, DF 5/5, PF 5/5        Sensory - Intact to LT     Reflexes - DTR Intact     Coordination - FTN intact     OculoVestibular - No saccades, No nystagmus, VOR         Balance - WNL Static  Psychiatric - Mood stable, Affect WNL  ----------------------------------------------------------------------------------------  ASSESSMENT/PLAN  69 yo female presented with dizziness  MRI head pending  PT for bed mobility, transfers, ambulation as tolerated  out of bed to chair as tolerated  OT for ADLs  Pain -  DVT PPX -   Rehab - will monitor for improvement with bedside therapy, goal is for home with outpatient vestibular therapy    Total time spent to review relevant records and imaging results, examine patient, complete documentation, and when applicable discuss the case with the patient, family, , social workers, and medical team:  55 minutes

## 2025-05-07 NOTE — OCCUPATIONAL THERAPY INITIAL EVALUATION ADULT - ADDITIONAL COMMENTS
Pt resides in a private home with her  and daughter, 3 steps to enter, 16 steps inside to second floor where bedroom/bathroom are. Pt's bathroom has a tub. Pt reports being independent with all ADLs and ambulating without a device prior to admission. Pt reports previous episode of dizziness in March, when she came to [facility]. Pt reports attending Outpatient vestibular physical therapy, 2 visits since. Pt was attending outpatient hand therapy for shoulder/hand tendinitis.   Orthostatics measured: BP supine in bed 140/53, BP sitting edge of bed 147/50, BP standing 160/75  Pt left supine in bed in NAD, all lines intact,  (+) alarm, call bell in reach, ОЛЕГ Jordan aware.

## 2025-05-07 NOTE — PHYSICAL THERAPY INITIAL EVALUATION ADULT - ADDITIONAL COMMENTS
Pt reported attending Outpatient vestibular physical therapy, had 2 visits . Pt was attending outpatient hand therapy for shoulder/hand tendinitis.   Orthostatics measured: BP supine in bed 140/53, BP seated 147/50, BP standing 160/75

## 2025-05-07 NOTE — PHYSICAL THERAPY INITIAL EVALUATION ADULT - PERTINENT HX OF CURRENT PROBLEM, REHAB EVAL
This is a 70 year old female with a PMHx significant for hypertension, vertigo presents with dizziness. Per chart review; Patient was in her usual state of health until this morning 5/6 8am. She woke up with mild dizziness and took a meclizine. She went to [facility] physical therapy, where during a head tilt maneuver she became acutely vertiginous. Her last known well was 1015 am and symptom onset was 1025 am. EMS was alerted and at approximately 1055am pt had complained of left hand numbness. Dizziness is worse with movement, and patient states symptoms are similar to her previous episodes of vertigo that required admission in March 2025.  Denies any vision disturbance, lateropulsion, but states she has left sided numbness. She reports feeling sensation of fullness in her face and ear but denies any ear pain, hearing loss or tinnitus. No falls or head strike. CTH and CTA in ED were unrevealing for acute infarct or significant posterior circulation stenosis.   Admit dx: Dizziness and giddiness, code stroke

## 2025-05-07 NOTE — PROGRESS NOTE ADULT - TIME BILLING
Patient encounter, including chart review, medication review, patient interview, ordering labs and medications, interpreting labs and imaging results, coordination of care with consultants, and discussing plan with patient, daughter at bedside, and healthcare team.

## 2025-05-07 NOTE — DISCHARGE NOTE PROVIDER - NSDCCPCAREPLAN_GEN_ALL_CORE_FT
PRINCIPAL DISCHARGE DIAGNOSIS  Diagnosis: Dizziness  Assessment and Plan of Treatment: You presented to the hospital with dizziness. You were given meclizine with little improvement, however reported improvement with the Epley maneuver. You were seen and examined by our Neurologists. Your dizziness is likely related to vertigo and you are stable for discharge. You can follow up outpatient to have your MRIs completed. Continue taking your medications as prescribed.     PRINCIPAL DISCHARGE DIAGNOSIS  Diagnosis: Dizziness  Assessment and Plan of Treatment: You presented to the hospital with dizziness. You were seen and examined by our Neurologists. Your dizziness is likely related to vertigo and you are stable for discharge. You can follow up outpatient to have your MRIs completed. Continue taking your medications as prescribed. Follow up with ENT, Neurology, and your PCP as advised. Continue vestibular therapy.     PRINCIPAL DISCHARGE DIAGNOSIS  Diagnosis: Dizziness  Assessment and Plan of Treatment: You presented to the hospital with dizziness. You were seen and examined by our Neurologists. Your dizziness is likely related to vertigo and you are stable for discharge. You can follow up outpatient to have your MRIs completed. Continue taking your medications as prescribed. Follow up with ENT, Neurology, and your PCP as advised. Continue vestibular therapy.

## 2025-05-07 NOTE — PHYSICAL THERAPY INITIAL EVALUATION ADULT - MANUAL MUSCLE TESTING RESULTS, REHAB EVAL
RUE 3/5 , R hand/ fingers 3-/5 ,LUE 4/5 bilateral LE's 4/5 throughout on extremities/grossly assessed due to

## 2025-05-07 NOTE — OCCUPATIONAL THERAPY INITIAL EVALUATION ADULT - GENERAL OBSERVATIONS, REHAB EVAL
Pt received supine in bed in NAD, +tele, all lines intact. Pt OK to be seen for OT per RN. HR 66 BPM.

## 2025-05-07 NOTE — OCCUPATIONAL THERAPY INITIAL EVALUATION ADULT - MANUAL MUSCLE TESTING RESULTS, REHAB EVAL
right UE shoulder and bilateral upper extremity digits 3-/5, left UE shoulder and bilateral upper extremity elbow 3/5/grossly assessed due to

## 2025-05-07 NOTE — PHYSICAL THERAPY INITIAL EVALUATION ADULT - GENERAL OBSERVATIONS, REHAB EVAL
Patient received supine in bed , +tele monitor , c/o dizziness with changes in head position , supine /53 63 96% at room air , daughter at bedside.

## 2025-05-07 NOTE — DISCHARGE NOTE PROVIDER - NSFOLLOWUPCLINICS_GEN_ALL_ED_FT
Stroke Discharge Program  Neurology  [address], [address]  [place] [address]  Phone: [phone]  Fax:

## 2025-05-07 NOTE — PROGRESS NOTE ADULT - SUBJECTIVE AND OBJECTIVE BOX
PROGRESS NOTE:   Authored by Jeanine Gaytan Ma, MD  Available on MS Teams; Pager [phone]    Patient is a 70y old  Female who presents with a chief complaint of     SUBJECTIVE / OVERNIGHT EVENTS: NAEON. Pt seen and examined with daughter at bedside this AM. Pt reports improvement in dizziness/vertigo s/p Epley maneuver this AM. Per daughter, pt previously had vestibular therapy outpatient, however this stopped, and patient's dizziness returned. She reports little relief with meclizine. She denies any other symptoms. States her L hand numbness resolved    All other review of systems is negative unless indicated above.    MEDICATIONS  (STANDING):  anastrozole 1 milliGRAM(s) Oral daily  aspirin  chewable 81 milliGRAM(s) Oral daily  atorvastatin 80 milliGRAM(s) Oral once  atorvastatin 80 milliGRAM(s) Oral at bedtime  clopidogrel Tablet 75 milliGRAM(s) Oral daily  enoxaparin Injectable 40 milliGRAM(s) SubCutaneous every 24 hours    MEDICATIONS  (PRN):  meclizine 12.5 milliGRAM(s) Oral every 6 hours PRN for dizziness  ondansetron    Tablet 4 milliGRAM(s) Oral every 8 hours PRN for dizziness      CAPILLARY BLOOD GLUCOSE  152 (06 May 2025 12:12)        I&O's Summary      PHYSICAL EXAM:  Vital Signs Last 24 Hrs  T(C): 36.7 (07 May 2025 04:00), Max: 36.9 (06 May 2025 14:00)  T(F): 98 (07 May 2025 04:00), Max: 98.4 (06 May 2025 14:00)  HR: 76 (07 May 2025 08:02) (58 - 98)  BP: 139/53 (07 May 2025 08:02) (112/47 - 157/84)  BP(mean): --  RR: 17 (07 May 2025 08:02) (16 - 18)  SpO2: 100% (07 May 2025 08:02) (97% - 100%)    Parameters below as of 07 May 2025 08:02  Patient On (Oxygen Delivery Method): room air        GENERAL: No acute distress  HEAD:  Normocephalic  EYES: Conjunctiva and sclera clear. EOMI. PERRLA. R beating nystagmus with left gaze  NECK: Supple  CHEST/LUNG: CTAB  HEART: Regular rate and rhythm  ABDOMEN: Soft, non-tender, non-distended  EXTREMITIES: No clubbing, cyanosis, or edema  NEUROLOGY: A&O x 3. CN II-XII grossly intact. 5/5 str to all 4 extremities. Sensation intact. FNF and heel to shin nml b/l. No pronator drift. Nml speech  SKIN: No rashes or lesions to visible skin    LABS:                        11.9   5.47  )-----------( 208      ( 07 May 2025 05:17 )             36.5     05-07    143  |  106  |  10  ----------------------------<  125[H]  4.0   |  22  |  0.60    Ca    9.4      07 May 2025 05:17  Phos  4.9     05-07  Mg     1.90     05-07    TPro  7.5  /  Alb  4.2  /  TBili  0.3  /  DBili  x   /  AST  31  /  ALT  31  /  AlkPhos  100  05-06    PT/INR - ( 06 May 2025 12:21 )   PT: 10.3 sec;   INR: <0.90 ratio         PTT - ( 06 May 2025 12:21 )  PTT:27.0 sec      Urinalysis Basic - ( 07 May 2025 05:17 )    Color: x / Appearance: x / SG: x / pH: x  Gluc: 125 mg/dL / Ketone: x  / Bili: x / Urobili: x   Blood: x / Protein: x / Nitrite: x   Leuk Esterase: x / RBC: x / WBC x   Sq Epi: x / Non Sq Epi: x / Bacteria: x            RADIOLOGY & ADDITIONAL TESTS: Reviewed

## 2025-05-07 NOTE — DISCHARGE NOTE NURSING/CASE MANAGEMENT/SOCIAL WORK - FINANCIAL ASSISTANCE
[facility] provides services at a reduced cost to those who are determined to be eligible through [facility]’s financial assistance program. Information regarding [facility]’s financial assistance program can be found by going to [email or web address] or by calling [phone].

## 2025-05-07 NOTE — DISCHARGE NOTE PROVIDER - HOSPITAL COURSE
HPI:  70F PMHx significant for hypertension, hyperlipidemia, CAD s/p stent (most recent 2022 on aspirin), vertigo who presents with worsening dizziness. She states she was at her physical therapist office undergoing therapy. She was being placed on an examination table and the therapist was reclining the patient back. While in motion backwards, the patient noticed that she became acutely dizzy. She describes it as vertigo that is worse than her baseline. She also noticed worsening of the numbness in her left hand that she states had resolved by the time of this interview. ROS is negative for all other symptoms.     (06 May 2025 15:47)    Hospital Course:    #Vertigo  Patient presented with acute dizziness during physical therapy while being placed backwards onto a table  Initially admitted as a code stroke  CTH non-con negative  Dysphagia screen passed  Symptoms improved with Epley Maneuver  Advised to f/u outpt to continue vestibular therapy  Seen by Neurology- symptoms likely due to peripheral vertigo symptoms. Can complete MRI outpatient  Cleared from stroke service for discharge home   HPI:  70F PMHx significant for hypertension, hyperlipidemia, CAD s/p stent (most recent 2022 on aspirin), vertigo who presents with worsening dizziness. She states she was at her physical therapist office undergoing therapy. She was being placed on an examination table and the therapist was reclining the patient back. While in motion backwards, the patient noticed that she became acutely dizzy. She describes it as vertigo that is worse than her baseline. She also noticed worsening of the numbness in her left hand that she states had resolved by the time of this interview. ROS is negative for all other symptoms.     (06 May 2025 15:47)    Hospital Course:    #Vertigo  Patient presented with acute dizziness during physical therapy while being placed backwards onto a table  Initially admitted as a code stroke  CTH non-con negative  Dysphagia screen passed  Symptoms improve with Epley Maneuver per pt and daughter  Advised to f/u outpt to continue vestibular therapy  Seen by Neurology- symptoms likely due to peripheral vertigo symptoms. Can complete MRI outpatient  Pt has ENT f/u scheduled  Cleared from stroke service for discharge home

## 2025-05-07 NOTE — DISCHARGE NOTE PROVIDER - ATTENDING DISCHARGE PHYSICAL EXAMINATION:
GENERAL: No acute distress  HEAD:  Normocephalic  EYES: Conjunctiva and sclera clear  NECK: Supple  CHEST/LUNG: CTAB  HEART: Regular rate and rhythm  ABDOMEN: Soft, non-tender, non-distended  EXTREMITIES:  No clubbing, cyanosis, or edema  NEUROLOGY: A&O x 3  SKIN: No rashes or lesions to visible skin GENERAL: No acute distress  HEAD:  Normocephalic  EYES: Conjunctiva and sclera clear  NECK: Supple  CHEST/LUNG: CTAB  HEART: Regular rate and rhythm  ABDOMEN: Soft, non-tender, non-distended  EXTREMITIES:  No clubbing, cyanosis, or edema  NEUROLOGY: A&O x 3. No focal deficits  SKIN: No rashes or lesions to visible skin

## 2025-05-07 NOTE — DISCHARGE NOTE PROVIDER - NSDCHC_MEDRECSTATUS_GEN_ALL_CORE
Last appt 3/20/20  Next appt 9/15/2020  Labs 06/02/2020 Admission Reconciliation is Completed  Discharge Reconciliation is Completed

## 2025-05-07 NOTE — DISCHARGE NOTE NURSING/CASE MANAGEMENT/SOCIAL WORK - PATIENT PORTAL LINK FT
You can access the FollowMyHealth Patient Portal offered by [facility] by registering at the following website: [email or web address]. By joining [facility]’s FollowMyHealth portal, you will also be able to view your health information using other applications (apps) compatible with our system.

## 2025-05-07 NOTE — PHYSICAL THERAPY INITIAL EVALUATION ADULT - PATIENT PROFILE REVIEW, REHAB EVAL
activity order- out of bed with assistance , ok for PT for ambulation as tolerated per ОЛЕГ Catherine./yes

## 2025-05-07 NOTE — OCCUPATIONAL THERAPY INITIAL EVALUATION ADULT - RANGE OF MOTION EXAMINATION, UPPER EXTREMITY
right UE shoulder active ROM 0-90* flexion, full passive ROM. Left UE shoulder and bilateral upper extremity elbows active ROM was WFL. bilateral upper extremity hands active PIP/DIP digit flexion ~1/2.

## 2025-05-07 NOTE — PROGRESS NOTE ADULT - PROBLEM SELECTOR PLAN 1
patient presenting with acute worsening of vertigo during physical therapy while being placed backwards onto a table. Exam as above- lower concern for stroke, pending workup to rule out  dysphagia screen passed    - Continue Epley Maneuver and outpt vestibular therapy  - MR brain to rule out stroke definitively  - MR neck for pain in neck with mild distal weakness in bilateral hands (resolved)  - stroke protocol ( asa load, plavix load, then asa/plavix x 21 days, statin, lovenox)  - permissive hypertension  - neurology recs appreciated  - TTE w/ bubble  - Telemetry  - c/w meclizine PRN

## 2025-05-07 NOTE — DISCHARGE NOTE PROVIDER - NSDCMRMEDTOKEN_GEN_ALL_CORE_FT
amLODIPine 10 mg oral tablet: 1 tab(s) orally once a day  anastrozole 1 mg oral tablet: 1 tab(s) orally once a day  aspirin 81 mg oral tablet: 1 tab(s) orally once a day  atorvastatin 40 mg oral tablet: 1 tab(s) orally once a day  enalapril 20 mg oral tablet: 1 tab(s) orally once a day  meclizine 12.5 mg oral tablet: 1 tab(s) orally every 6 hours as needed for  dizziness  ondansetron 4 mg oral tablet: 1 tab(s) orally every 8 hours as needed for  dizziness  spironolactone 25 mg oral tablet: 1 tab(s) orally once a day   amLODIPine 10 mg oral tablet: 1 tab(s) orally once a day  anastrozole 1 mg oral tablet: 1 tab(s) orally once a day  aspirin 81 mg oral tablet: 1 tab(s) orally once a day  atorvastatin 40 mg oral tablet: 1 tab(s) orally once a day  enalapril 20 mg oral tablet: 1 tab(s) orally once a day  meclizine 25 mg oral tablet: 1 tab(s) orally every 8 hours as needed for  dizziness  ondansetron 4 mg oral tablet: 1 tab(s) orally every 8 hours as needed for  dizziness  spironolactone 25 mg oral tablet: 1 tab(s) orally once a day

## 2025-05-07 NOTE — DISCHARGE NOTE PROVIDER - NSDCFUADDAPPT_GEN_ALL_CORE_FT
APPTS ARE READY TO BE MADE: [X] YES    Best Family or Patient Contact (if needed):    Additional Information about above appointments (if needed):    1: Vascular neurology at [address] [place] [address] [phone] for outpatient MRI brain  2:   3:     Other comments or requests:    APPTS ARE READY TO BE MADE: [X] YES    Best Family or Patient Contact (if needed):    Additional Information about above appointments (if needed):    1: Vascular neurology at [address] [place] [address] [phone] for outpatient MRI brain  2: ENT for vestibular therapy  3: PCP within 1-2 weeks for transitions of care    Other comments or requests:    APPTS ARE READY TO BE MADE: [X] YES    Best Family or Patient Contact (if needed):    Additional Information about above appointments (if needed):    1: Vascular neurology at [address] [place] [address] [phone] for outpatient MRI brain  2: ENT for vestibular therapy  3: PCP within 1-2 weeks for transitions of care    Other comments or requests:     Appointment was scheduled in [facility]. Patient was scheduled for an appointment on 06/09/25 1:30pm at [address] [place] [address] [place].

## 2025-05-07 NOTE — DISCHARGE NOTE NURSING/CASE MANAGEMENT/SOCIAL WORK - NSDCFUADDAPPT_GEN_ALL_CORE_FT
APPTS ARE READY TO BE MADE: [X] YES    Best Family or Patient Contact (if needed):    Additional Information about above appointments (if needed):    1: Vascular neurology at [address] [place] [address] [phone] for outpatient MRI brain  2: ENT for vestibular therapy  3: PCP within 1-2 weeks for transitions of care    Other comments or requests:

## 2025-05-07 NOTE — PHYSICAL THERAPY INITIAL EVALUATION ADULT - LEVEL OF INDEPENDENCE: GAIT, REHAB EVAL
due to patient with c/o severe dizziness and room spinning - CHRISTIE Lynn is at bedside and made aware ./unable to perform

## 2025-05-07 NOTE — DISCHARGE NOTE PROVIDER - NSDCFUSCHEDAPPT_GEN_ALL_CORE_FT
Morena Valencia  [facility]  [address] [address] [facility] [address]  Scheduled Appointment: 05/20/2025    Alma Rosa Winters  [facility]  [address]  Scheduled Appointment: 06/19/2025    Piter Tolentino  [facility]  CARDIOLOGY [id number]  Scheduled Appointment: 06/30/2025     Morena Valencia  [facility]  [address] [address] [facility]  Scheduled Appointment: 05/20/2025    Jackson Cabrera  [facility]  NEUROLOGY [address] [facility]  Scheduled Appointment: 06/09/2025    Alma Rosa Winters  [facility]  [address] [facility]  Scheduled Appointment: 06/19/2025    Piter Tolentino  [facility]  CARDIOLOGY [address]  Scheduled Appointment: 06/30/2025

## 2025-05-12 LAB
25(OH)D3 SERPL-MCNC: 28.7 NG/ML
ALBUMIN SERPL ELPH-MCNC: 4.2 G/DL
ALP BLD-CCNC: 98 U/L
ALT SERPL-CCNC: 31 U/L
ANION GAP SERPL CALC-SCNC: 15 MMOL/L
AST SERPL-CCNC: 30 U/L
BASOPHILS # BLD AUTO: 0.02 K/UL
BASOPHILS NFR BLD AUTO: 0.4 %
BILIRUB DIRECT SERPL-MCNC: 0.1 MG/DL
BILIRUB INDIRECT SERPL-MCNC: 0.3 MG/DL
BILIRUB SERPL-MCNC: 0.4 MG/DL
BUN SERPL-MCNC: 12 MG/DL
CALCIUM SERPL-MCNC: 9.6 MG/DL
CHLORIDE SERPL-SCNC: 108 MMOL/L
CHOLEST SERPL-MCNC: 190 MG/DL
CO2 SERPL-SCNC: 21 MMOL/L
CREAT SERPL-MCNC: 0.64 MG/DL
EGFRCR SERPLBLD CKD-EPI 2021: 95 ML/MIN/1.73M2
EOSINOPHIL # BLD AUTO: 0.2 K/UL
EOSINOPHIL NFR BLD AUTO: 3.5 %
ESTIMATED AVERAGE GLUCOSE: 128 MG/DL
FOLATE SERPL-MCNC: >20 NG/ML
GLUCOSE SERPL-MCNC: 112 MG/DL
HBA1C MFR BLD HPLC: 6.1 %
HCT VFR BLD CALC: 38 %
HDLC SERPL-MCNC: 59 MG/DL
HGB BLD-MCNC: 12 G/DL
IMM GRANULOCYTES NFR BLD AUTO: 0.2 %
LDLC SERPL-MCNC: 103 MG/DL
LYMPHOCYTES # BLD AUTO: 1.28 K/UL
LYMPHOCYTES NFR BLD AUTO: 22.5 %
MAN DIFF?: NORMAL
MCHC RBC-ENTMCNC: 30.3 PG
MCHC RBC-ENTMCNC: 31.6 G/DL
MCV RBC AUTO: 96 FL
MONOCYTES # BLD AUTO: 0.33 K/UL
MONOCYTES NFR BLD AUTO: 5.8 %
NEUTROPHILS # BLD AUTO: 3.85 K/UL
NEUTROPHILS NFR BLD AUTO: 67.6 %
NONHDLC SERPL-MCNC: 131 MG/DL
PLATELET # BLD AUTO: 229 K/UL
POTASSIUM SERPL-SCNC: 4.3 MMOL/L
PROT SERPL-MCNC: 7.2 G/DL
RBC # BLD: 3.96 M/UL
RBC # FLD: 13.4 %
SODIUM SERPL-SCNC: 144 MMOL/L
TRIGL SERPL-MCNC: 160 MG/DL
TSH SERPL-ACNC: 1.88 UIU/ML
VIT B12 SERPL-MCNC: 419 PG/ML
WBC # FLD AUTO: 5.69 K/UL

## 2025-05-13 RX ORDER — EZETIMIBE 10 MG/1
10 TABLET ORAL DAILY
Qty: 30 | Refills: 1 | Status: ACTIVE | COMMUNITY
Start: 2025-05-13 | End: 1900-01-01

## 2025-05-19 ENCOUNTER — APPOINTMENT (OUTPATIENT)
Dept: NEUROLOGY | Facility: CLINIC | Age: 70
End: 2025-05-19

## 2025-05-23 ENCOUNTER — APPOINTMENT (OUTPATIENT)
Dept: GASTROENTEROLOGY | Facility: CLINIC | Age: 70
End: 2025-05-23
Payer: COMMERCIAL

## 2025-05-23 VITALS
DIASTOLIC BLOOD PRESSURE: 80 MMHG | WEIGHT: 188 LBS | SYSTOLIC BLOOD PRESSURE: 120 MMHG | HEIGHT: 62 IN | BODY MASS INDEX: 34.6 KG/M2 | OXYGEN SATURATION: 98 % | RESPIRATION RATE: 12 BRPM | HEART RATE: 61 BPM

## 2025-05-23 DIAGNOSIS — R42 DIZZINESS AND GIDDINESS: ICD-10-CM

## 2025-05-23 DIAGNOSIS — B96.81 GASTRITIS, UNSPECIFIED, W/OUT BLEEDING: ICD-10-CM

## 2025-05-23 DIAGNOSIS — K29.70 GASTRITIS, UNSPECIFIED, W/OUT BLEEDING: ICD-10-CM

## 2025-05-23 PROCEDURE — 83014 H PYLORI DRUG ADMIN: CPT

## 2025-05-23 PROCEDURE — 99214 OFFICE O/P EST MOD 30 MIN: CPT

## 2025-05-24 ENCOUNTER — OUTPATIENT (OUTPATIENT)
Dept: OUTPATIENT SERVICES | Facility: HOSPITAL | Age: 70
LOS: 1 days | End: 2025-05-24
Payer: COMMERCIAL

## 2025-05-24 ENCOUNTER — RESULT REVIEW (OUTPATIENT)
Age: 70
End: 2025-05-24

## 2025-05-24 ENCOUNTER — APPOINTMENT (OUTPATIENT)
Dept: MRI IMAGING | Facility: CLINIC | Age: 70
End: 2025-05-24

## 2025-05-24 DIAGNOSIS — Z95.5 PRESENCE OF CORONARY ANGIOPLASTY IMPLANT AND GRAFT: Chronic | ICD-10-CM

## 2025-05-24 DIAGNOSIS — Z90.710 ACQUIRED ABSENCE OF BOTH CERVIX AND UTERUS: Chronic | ICD-10-CM

## 2025-05-24 DIAGNOSIS — Z98.89 OTHER SPECIFIED POSTPROCEDURAL STATES: Chronic | ICD-10-CM

## 2025-05-24 DIAGNOSIS — Z00.8 ENCOUNTER FOR OTHER GENERAL EXAMINATION: ICD-10-CM

## 2025-05-24 PROCEDURE — 72141 MRI NECK SPINE W/O DYE: CPT | Mod: 26

## 2025-05-24 PROCEDURE — 70551 MRI BRAIN STEM W/O DYE: CPT

## 2025-05-24 PROCEDURE — 70551 MRI BRAIN STEM W/O DYE: CPT | Mod: 26

## 2025-05-24 PROCEDURE — 72141 MRI NECK SPINE W/O DYE: CPT

## 2025-05-27 LAB — UREA BREATH TEST QL: NEGATIVE

## 2025-06-09 ENCOUNTER — APPOINTMENT (OUTPATIENT)
Dept: NEUROLOGY | Facility: CLINIC | Age: 70
End: 2025-06-09
Payer: COMMERCIAL

## 2025-06-09 ENCOUNTER — APPOINTMENT (OUTPATIENT)
Dept: NEUROLOGY | Facility: CLINIC | Age: 70
End: 2025-06-09

## 2025-06-09 VITALS
HEART RATE: 61 BPM | HEIGHT: 62 IN | DIASTOLIC BLOOD PRESSURE: 83 MMHG | SYSTOLIC BLOOD PRESSURE: 167 MMHG | BODY MASS INDEX: 33.86 KG/M2 | OXYGEN SATURATION: 99 % | RESPIRATION RATE: 21 BRPM | WEIGHT: 184 LBS

## 2025-06-09 PROCEDURE — 99205 OFFICE O/P NEW HI 60 MIN: CPT

## 2025-06-09 PROCEDURE — G2211 COMPLEX E/M VISIT ADD ON: CPT

## 2025-06-17 ENCOUNTER — APPOINTMENT (OUTPATIENT)
Dept: MRI IMAGING | Facility: CLINIC | Age: 70
End: 2025-06-17

## 2025-06-19 ENCOUNTER — APPOINTMENT (OUTPATIENT)
Dept: OTOLARYNGOLOGY | Facility: CLINIC | Age: 70
End: 2025-06-19

## 2025-06-19 VITALS — HEIGHT: 62 IN | WEIGHT: 184 LBS | BODY MASS INDEX: 33.86 KG/M2

## 2025-06-19 PROBLEM — R90.89 ABNORMAL BRAIN MRI: Status: ACTIVE | Noted: 2025-06-09

## 2025-06-19 PROBLEM — G43.809 VESTIBULAR MIGRAINE: Status: ACTIVE | Noted: 2025-06-19

## 2025-06-19 PROBLEM — H81.11 BPPV (BENIGN PAROXYSMAL POSITIONAL VERTIGO), RIGHT: Status: ACTIVE | Noted: 2025-06-19

## 2025-06-19 PROCEDURE — 92504 EAR MICROSCOPY EXAMINATION: CPT

## 2025-06-19 PROCEDURE — 99204 OFFICE O/P NEW MOD 45 MIN: CPT

## 2025-06-20 ENCOUNTER — TRANSCRIPTION ENCOUNTER (OUTPATIENT)
Age: 70
End: 2025-06-20

## 2025-06-30 ENCOUNTER — APPOINTMENT (OUTPATIENT)
Dept: CARDIOLOGY | Facility: CLINIC | Age: 70
End: 2025-06-30
Payer: COMMERCIAL

## 2025-06-30 ENCOUNTER — NON-APPOINTMENT (OUTPATIENT)
Age: 70
End: 2025-06-30

## 2025-06-30 VITALS — SYSTOLIC BLOOD PRESSURE: 132 MMHG | DIASTOLIC BLOOD PRESSURE: 64 MMHG

## 2025-06-30 VITALS
WEIGHT: 184 LBS | RESPIRATION RATE: 12 BRPM | BODY MASS INDEX: 33.86 KG/M2 | SYSTOLIC BLOOD PRESSURE: 114 MMHG | HEART RATE: 65 BPM | OXYGEN SATURATION: 98 % | DIASTOLIC BLOOD PRESSURE: 70 MMHG | HEIGHT: 62 IN

## 2025-06-30 PROCEDURE — 93000 ELECTROCARDIOGRAM COMPLETE: CPT

## 2025-06-30 PROCEDURE — 99214 OFFICE O/P EST MOD 30 MIN: CPT | Mod: 25

## 2025-07-08 ENCOUNTER — APPOINTMENT (OUTPATIENT)
Dept: MRI IMAGING | Facility: CLINIC | Age: 70
End: 2025-07-08

## 2025-07-08 ENCOUNTER — OUTPATIENT (OUTPATIENT)
Dept: OUTPATIENT SERVICES | Facility: HOSPITAL | Age: 70
LOS: 1 days | End: 2025-07-08
Payer: COMMERCIAL

## 2025-07-08 DIAGNOSIS — Z95.5 PRESENCE OF CORONARY ANGIOPLASTY IMPLANT AND GRAFT: Chronic | ICD-10-CM

## 2025-07-08 DIAGNOSIS — Z90.710 ACQUIRED ABSENCE OF BOTH CERVIX AND UTERUS: Chronic | ICD-10-CM

## 2025-07-08 DIAGNOSIS — R90.89 OTHER ABNORMAL FINDINGS ON DIAGNOSTIC IMAGING OF CENTRAL NERVOUS SYSTEM: ICD-10-CM

## 2025-07-08 DIAGNOSIS — R42 DIZZINESS AND GIDDINESS: ICD-10-CM

## 2025-07-08 DIAGNOSIS — Z98.89 OTHER SPECIFIED POSTPROCEDURAL STATES: Chronic | ICD-10-CM

## 2025-07-08 PROCEDURE — 72156 MRI NECK SPINE W/O & W/DYE: CPT | Mod: 26

## 2025-07-08 PROCEDURE — 70553 MRI BRAIN STEM W/O & W/DYE: CPT

## 2025-07-08 PROCEDURE — 70546 MR ANGIOGRAPH HEAD W/O&W/DYE: CPT | Mod: 26,59

## 2025-07-08 PROCEDURE — 70553 MRI BRAIN STEM W/O & W/DYE: CPT | Mod: 26

## 2025-07-08 PROCEDURE — 72156 MRI NECK SPINE W/O & W/DYE: CPT

## 2025-07-08 PROCEDURE — A9585: CPT

## 2025-07-08 PROCEDURE — 70546 MR ANGIOGRAPH HEAD W/O&W/DYE: CPT

## 2025-07-14 ENCOUNTER — NON-APPOINTMENT (OUTPATIENT)
Age: 70
End: 2025-07-14

## 2025-07-16 ENCOUNTER — NON-APPOINTMENT (OUTPATIENT)
Age: 70
End: 2025-07-16

## 2025-08-21 ENCOUNTER — APPOINTMENT (OUTPATIENT)
Dept: OTOLARYNGOLOGY | Facility: CLINIC | Age: 70
End: 2025-08-21

## 2025-08-21 VITALS
SYSTOLIC BLOOD PRESSURE: 165 MMHG | HEIGHT: 62 IN | DIASTOLIC BLOOD PRESSURE: 85 MMHG | HEART RATE: 63 BPM | WEIGHT: 184 LBS | BODY MASS INDEX: 33.86 KG/M2

## 2025-08-21 DIAGNOSIS — H81.11 BENIGN PAROXYSMAL VERTIGO, RIGHT EAR: ICD-10-CM

## 2025-08-21 DIAGNOSIS — R42 DIZZINESS AND GIDDINESS: ICD-10-CM

## 2025-08-21 DIAGNOSIS — G43.809 OTHER MIGRAINE, NOT INTRACTABLE, W/OUT STATUS MIGRAINOSUS: ICD-10-CM

## 2025-08-21 PROCEDURE — 99213 OFFICE O/P EST LOW 20 MIN: CPT

## 2025-08-21 PROCEDURE — 92504 EAR MICROSCOPY EXAMINATION: CPT

## 2025-08-21 RX ORDER — LETROZOLE TABLETS 2.5 MG/1
2.5 TABLET, FILM COATED ORAL
Refills: 0 | Status: ACTIVE | COMMUNITY

## 2025-08-26 ENCOUNTER — APPOINTMENT (OUTPATIENT)
Dept: NEUROLOGY | Facility: CLINIC | Age: 70
End: 2025-08-26

## 2025-09-09 ENCOUNTER — APPOINTMENT (OUTPATIENT)
Dept: CARDIOLOGY | Facility: CLINIC | Age: 70
End: 2025-09-09
Payer: COMMERCIAL

## 2025-09-09 ENCOUNTER — NON-APPOINTMENT (OUTPATIENT)
Age: 70
End: 2025-09-09

## 2025-09-09 VITALS
DIASTOLIC BLOOD PRESSURE: 74 MMHG | WEIGHT: 190 LBS | HEIGHT: 62 IN | OXYGEN SATURATION: 99 % | BODY MASS INDEX: 34.96 KG/M2 | RESPIRATION RATE: 14 BRPM | HEART RATE: 65 BPM | SYSTOLIC BLOOD PRESSURE: 134 MMHG

## 2025-09-09 DIAGNOSIS — I25.10 ATHEROSCLEROTIC HEART DISEASE OF NATIVE CORONARY ARTERY W/OUT ANGINA PECTORIS: ICD-10-CM

## 2025-09-09 DIAGNOSIS — I10 ESSENTIAL (PRIMARY) HYPERTENSION: ICD-10-CM

## 2025-09-09 LAB
25(OH)D3 SERPL-MCNC: 27.2 NG/ML
ALBUMIN SERPL ELPH-MCNC: 4.4 G/DL
ALP BLD-CCNC: 91 U/L
ALT SERPL-CCNC: 17 U/L
ANION GAP SERPL CALC-SCNC: 15 MMOL/L
AST SERPL-CCNC: 27 U/L
BASOPHILS # BLD AUTO: 0.02 K/UL
BASOPHILS NFR BLD AUTO: 0.4 %
BILIRUB SERPL-MCNC: 0.4 MG/DL
BUN SERPL-MCNC: 17 MG/DL
CALCIUM SERPL-MCNC: 10.1 MG/DL
CHLORIDE SERPL-SCNC: 107 MMOL/L
CHOLEST SERPL-MCNC: 220 MG/DL
CO2 SERPL-SCNC: 21 MMOL/L
CREAT SERPL-MCNC: 0.72 MG/DL
EGFRCR SERPLBLD CKD-EPI 2021: 90 ML/MIN/1.73M2
EOSINOPHIL # BLD AUTO: 0.14 K/UL
EOSINOPHIL NFR BLD AUTO: 3.1 %
ESTIMATED AVERAGE GLUCOSE: 143 MG/DL
FOLATE SERPL-MCNC: >20 NG/ML
GLUCOSE SERPL-MCNC: 104 MG/DL
HBA1C MFR BLD HPLC: 6.6 %
HCT VFR BLD CALC: 36.9 %
HDLC SERPL-MCNC: 43 MG/DL
HGB BLD-MCNC: 12.1 G/DL
IMM GRANULOCYTES NFR BLD AUTO: 0.2 %
IRON SERPL-MCNC: 88 UG/DL
LDLC SERPL-MCNC: 136 MG/DL
LYMPHOCYTES # BLD AUTO: 1.03 K/UL
LYMPHOCYTES NFR BLD AUTO: 22.8 %
MAGNESIUM SERPL-MCNC: 1.9 MG/DL
MAN DIFF?: NORMAL
MCHC RBC-ENTMCNC: 30 PG
MCHC RBC-ENTMCNC: 32.8 G/DL
MCV RBC AUTO: 91.6 FL
MONOCYTES # BLD AUTO: 0.26 K/UL
MONOCYTES NFR BLD AUTO: 5.8 %
NEUTROPHILS # BLD AUTO: 3.06 K/UL
NEUTROPHILS NFR BLD AUTO: 67.7 %
NONHDLC SERPL-MCNC: 177 MG/DL
PLATELET # BLD AUTO: 236 K/UL
POTASSIUM SERPL-SCNC: 4.5 MMOL/L
PROT SERPL-MCNC: 7.7 G/DL
RBC # BLD: 4.03 M/UL
RBC # FLD: 12.8 %
SODIUM SERPL-SCNC: 143 MMOL/L
TRIGL SERPL-MCNC: 225 MG/DL
TSH SERPL-ACNC: 2.95 UIU/ML
VIT B12 SERPL-MCNC: 542 PG/ML
WBC # FLD AUTO: 4.52 K/UL

## 2025-09-09 PROCEDURE — 99214 OFFICE O/P EST MOD 30 MIN: CPT | Mod: 25

## 2025-09-09 PROCEDURE — 93000 ELECTROCARDIOGRAM COMPLETE: CPT

## (undated) DEVICE — ELCTR GROUNDING PAD ADULT COVIDIEN

## (undated) DEVICE — SOL INJ NS 0.9% 500ML 2 PORT

## (undated) DEVICE — BALLOON US ENDO

## (undated) DEVICE — TUBING SUCTION CONN 6FT STERILE

## (undated) DEVICE — BRUSH COLONOSCOPY CYTOLOGY

## (undated) DEVICE — TUBING SUCTION 20FT

## (undated) DEVICE — FORCEP RADIAL JAW 4 JUMBO 2.8MM 3.2MM 240CM ORANGE DISP

## (undated) DEVICE — FOLEY HOLDER STATLOCK 2 WAY ADULT

## (undated) DEVICE — CATH IV SAFE BC 22G X 1" (BLUE)

## (undated) DEVICE — POLY TRAP ETRAP

## (undated) DEVICE — SUCTION YANKAUER NO CONTROL VENT

## (undated) DEVICE — PACK IV START WITH CHG

## (undated) DEVICE — IRRIGATOR BIO SHIELD

## (undated) DEVICE — SYR ALLIANCE II INFLATION 60ML

## (undated) DEVICE — BIOPSY FORCEP RADIAL JAW 4 STANDARD WITH NEEDLE

## (undated) DEVICE — CLAMP BX HOT RAD JAW 3

## (undated) DEVICE — SNARE POLYP SENS SM 13MM 240CM

## (undated) DEVICE — BITE BLOCK ADULT 20 X 27MM (GREEN)

## (undated) DEVICE — TUBING IV SET GRAVITY 3Y 100" MACRO

## (undated) DEVICE — SYR LUER LOK 50CC

## (undated) DEVICE — SENSOR O2 FINGER ADULT

## (undated) DEVICE — CATH IV SAFE BC 20G X 1.16" (PINK)